# Patient Record
Sex: FEMALE | Race: WHITE | NOT HISPANIC OR LATINO | Employment: UNEMPLOYED | URBAN - NONMETROPOLITAN AREA
[De-identification: names, ages, dates, MRNs, and addresses within clinical notes are randomized per-mention and may not be internally consistent; named-entity substitution may affect disease eponyms.]

---

## 2017-07-15 ENCOUNTER — HOSPITAL ENCOUNTER (EMERGENCY)
Facility: HOSPITAL | Age: 52
Discharge: STILL ADMITTED OR ADMINISTRATIVE | End: 2017-07-16
Attending: EMERGENCY MEDICINE

## 2017-07-15 DIAGNOSIS — F32.1 MODERATE SINGLE CURRENT EPISODE OF MAJOR DEPRESSIVE DISORDER (HCC): ICD-10-CM

## 2017-07-15 DIAGNOSIS — F10.220 ALCOHOL DEPENDENCE WITH UNCOMPLICATED INTOXICATION (HCC): Primary | ICD-10-CM

## 2017-07-15 LAB
6-ACETYL MORPHINE: NEGATIVE
ALBUMIN SERPL-MCNC: 5 G/DL (ref 3.5–5)
ALBUMIN/GLOB SERPL: 1.4 G/DL (ref 1.5–2.5)
ALP SERPL-CCNC: 142 U/L (ref 35–104)
ALT SERPL W P-5'-P-CCNC: 77 U/L (ref 10–36)
AMPHET+METHAMPHET UR QL: NEGATIVE
ANION GAP SERPL CALCULATED.3IONS-SCNC: 10.1 MMOL/L (ref 3.6–11.2)
AST SERPL-CCNC: 138 U/L (ref 10–30)
BACTERIA UR QL AUTO: ABNORMAL /HPF
BARBITURATES UR QL SCN: NEGATIVE
BASOPHILS # BLD AUTO: 0.07 10*3/MM3 (ref 0–0.3)
BASOPHILS NFR BLD AUTO: 1.2 % (ref 0–2)
BENZODIAZ UR QL SCN: NEGATIVE
BILIRUB SERPL-MCNC: 0.5 MG/DL (ref 0.2–1.8)
BILIRUB UR QL STRIP: NEGATIVE
BUN BLD-MCNC: 7 MG/DL (ref 7–21)
BUN/CREAT SERPL: 11.1 (ref 7–25)
BUPRENORPHINE SERPL-MCNC: NEGATIVE NG/ML
CALCIUM SPEC-SCNC: 9.6 MG/DL (ref 7.7–10)
CANNABINOIDS SERPL QL: NEGATIVE
CHLORIDE SERPL-SCNC: 105 MMOL/L (ref 99–112)
CLARITY UR: CLEAR
CO2 SERPL-SCNC: 24.9 MMOL/L (ref 24.3–31.9)
COCAINE UR QL: NEGATIVE
COLOR UR: YELLOW
CREAT BLD-MCNC: 0.63 MG/DL (ref 0.43–1.29)
DEPRECATED RDW RBC AUTO: 42.8 FL (ref 37–54)
EOSINOPHIL # BLD AUTO: 0.14 10*3/MM3 (ref 0–0.7)
EOSINOPHIL NFR BLD AUTO: 2.3 % (ref 0–5)
ERYTHROCYTE [DISTWIDTH] IN BLOOD BY AUTOMATED COUNT: 11.5 % (ref 11.5–14.5)
ETHANOL BLD-MCNC: 313 MG/DL
ETHANOL UR QL: 0.31 %
GFR SERPL CREATININE-BSD FRML MDRD: 100 ML/MIN/1.73
GLOBULIN UR ELPH-MCNC: 3.5 GM/DL
GLUCOSE BLD-MCNC: 87 MG/DL (ref 70–110)
GLUCOSE UR STRIP-MCNC: NEGATIVE MG/DL
HCT VFR BLD AUTO: 44.6 % (ref 37–47)
HGB BLD-MCNC: 15.4 G/DL (ref 12–16)
HGB UR QL STRIP.AUTO: NEGATIVE
HYALINE CASTS UR QL AUTO: ABNORMAL /LPF
IMM GRANULOCYTES # BLD: 0.01 10*3/MM3 (ref 0–0.03)
IMM GRANULOCYTES NFR BLD: 0.2 % (ref 0–0.5)
KETONES UR QL STRIP: NEGATIVE
LEUKOCYTE ESTERASE UR QL STRIP.AUTO: ABNORMAL
LYMPHOCYTES # BLD AUTO: 1.92 10*3/MM3 (ref 1–3)
LYMPHOCYTES NFR BLD AUTO: 31.7 % (ref 21–51)
MCH RBC QN AUTO: 34.9 PG (ref 27–33)
MCHC RBC AUTO-ENTMCNC: 34.5 G/DL (ref 33–37)
MCV RBC AUTO: 101.1 FL (ref 80–94)
METHADONE UR QL SCN: NEGATIVE
MONOCYTES # BLD AUTO: 0.74 10*3/MM3 (ref 0.1–0.9)
MONOCYTES NFR BLD AUTO: 12.2 % (ref 0–10)
NEUTROPHILS # BLD AUTO: 3.17 10*3/MM3 (ref 1.4–6.5)
NEUTROPHILS NFR BLD AUTO: 52.4 % (ref 30–70)
NITRITE UR QL STRIP: NEGATIVE
NRBC BLD MANUAL-RTO: 0 /100 WBC (ref 0–0)
OPIATES UR QL: NEGATIVE
OSMOLALITY SERPL CALC.SUM OF ELEC: 276.7 MOSM/KG (ref 273–305)
OXYCODONE UR QL SCN: NEGATIVE
PCP UR QL SCN: NEGATIVE
PH UR STRIP.AUTO: 6.5 [PH] (ref 5–8)
PLATELET # BLD AUTO: 188 10*3/MM3 (ref 130–400)
PMV BLD AUTO: 10.2 FL (ref 6–10)
POTASSIUM BLD-SCNC: 4.2 MMOL/L (ref 3.5–5.3)
PROT SERPL-MCNC: 8.5 G/DL (ref 6–8)
PROT UR QL STRIP: NEGATIVE
RBC # BLD AUTO: 4.41 10*6/MM3 (ref 4.2–5.4)
RBC # UR: ABNORMAL /HPF
REF LAB TEST METHOD: ABNORMAL
SODIUM BLD-SCNC: 140 MMOL/L (ref 135–153)
SP GR UR STRIP: 1.01 (ref 1–1.03)
SQUAMOUS #/AREA URNS HPF: ABNORMAL /HPF
UROBILINOGEN UR QL STRIP: ABNORMAL
WBC NRBC COR # BLD: 6.05 10*3/MM3 (ref 4.5–12.5)
WBC UR QL AUTO: ABNORMAL /HPF

## 2017-07-15 PROCEDURE — 87186 SC STD MICRODIL/AGAR DIL: CPT | Performed by: EMERGENCY MEDICINE

## 2017-07-15 PROCEDURE — 87077 CULTURE AEROBIC IDENTIFY: CPT | Performed by: EMERGENCY MEDICINE

## 2017-07-15 RX ORDER — NICOTINE 21 MG/24HR
1 PATCH, TRANSDERMAL 24 HOURS TRANSDERMAL ONCE
Status: DISCONTINUED | OUTPATIENT
Start: 2017-07-15 | End: 2017-07-16 | Stop reason: HOSPADM

## 2017-07-15 RX ORDER — CHLORDIAZEPOXIDE HYDROCHLORIDE 25 MG/1
25 CAPSULE, GELATIN COATED ORAL ONCE
Status: COMPLETED | OUTPATIENT
Start: 2017-07-15 | End: 2017-07-15

## 2017-07-15 RX ORDER — LORAZEPAM 2 MG/ML
1 INJECTION INTRAMUSCULAR ONCE
Status: COMPLETED | OUTPATIENT
Start: 2017-07-15 | End: 2017-07-15

## 2017-07-15 RX ORDER — SODIUM CHLORIDE 0.9 % (FLUSH) 0.9 %
10 SYRINGE (ML) INJECTION AS NEEDED
Status: DISCONTINUED | OUTPATIENT
Start: 2017-07-15 | End: 2017-07-16 | Stop reason: HOSPADM

## 2017-07-15 RX ADMIN — LORAZEPAM 1 MG: 2 INJECTION INTRAMUSCULAR; INTRAVENOUS at 20:53

## 2017-07-15 RX ADMIN — CHLORDIAZEPOXIDE HYDROCHLORIDE 25 MG: 25 CAPSULE ORAL at 23:38

## 2017-07-15 RX ADMIN — THIAMINE HYDROCHLORIDE 1000 ML/HR: 100 INJECTION, SOLUTION INTRAMUSCULAR; INTRAVENOUS at 21:01

## 2017-07-15 RX ADMIN — NICOTINE 1 PATCH: 21 PATCH, EXTENDED RELEASE TRANSDERMAL at 21:01

## 2017-07-16 ENCOUNTER — HOSPITAL ENCOUNTER (INPATIENT)
Facility: HOSPITAL | Age: 52
LOS: 3 days | Discharge: HOME | End: 2017-07-19
Attending: PSYCHIATRY & NEUROLOGY | Admitting: PSYCHIATRY & NEUROLOGY

## 2017-07-16 VITALS
BODY MASS INDEX: 23.25 KG/M2 | SYSTOLIC BLOOD PRESSURE: 136 MMHG | DIASTOLIC BLOOD PRESSURE: 70 MMHG | WEIGHT: 157 LBS | RESPIRATION RATE: 18 BRPM | HEIGHT: 69 IN | HEART RATE: 86 BPM | TEMPERATURE: 98.1 F | OXYGEN SATURATION: 100 %

## 2017-07-16 PROBLEM — F10.20 ALCOHOL DEPENDENCE (HCC): Status: ACTIVE | Noted: 2017-07-16

## 2017-07-16 LAB
B-HCG UR QL: NEGATIVE
ETHANOL BLD-MCNC: 111 MG/DL
ETHANOL UR QL: 0.11 %

## 2017-07-16 PROCEDURE — 99223 1ST HOSP IP/OBS HIGH 75: CPT | Performed by: PSYCHIATRY & NEUROLOGY

## 2017-07-16 PROCEDURE — HZ2ZZZZ DETOXIFICATION SERVICES FOR SUBSTANCE ABUSE TREATMENT: ICD-10-PCS | Performed by: PSYCHIATRY & NEUROLOGY

## 2017-07-16 RX ORDER — LORAZEPAM 2 MG/1
2 TABLET ORAL
Status: COMPLETED | OUTPATIENT
Start: 2017-07-17 | End: 2017-07-17

## 2017-07-16 RX ORDER — ACETAMINOPHEN 325 MG/1
650 TABLET ORAL EVERY 4 HOURS PRN
Status: DISCONTINUED | OUTPATIENT
Start: 2017-07-16 | End: 2017-07-16

## 2017-07-16 RX ORDER — SULFAMETHOXAZOLE AND TRIMETHOPRIM 800; 160 MG/1; MG/1
1 TABLET ORAL EVERY 12 HOURS SCHEDULED
Status: COMPLETED | OUTPATIENT
Start: 2017-07-16 | End: 2017-07-18

## 2017-07-16 RX ORDER — NICOTINE 21 MG/24HR
1 PATCH, TRANSDERMAL 24 HOURS TRANSDERMAL DAILY
Status: DISCONTINUED | OUTPATIENT
Start: 2017-07-16 | End: 2017-07-19 | Stop reason: HOSPADM

## 2017-07-16 RX ORDER — ONDANSETRON 4 MG/1
4 TABLET, FILM COATED ORAL EVERY 6 HOURS PRN
Status: DISCONTINUED | OUTPATIENT
Start: 2017-07-16 | End: 2017-07-19 | Stop reason: HOSPADM

## 2017-07-16 RX ORDER — TRAZODONE HYDROCHLORIDE 50 MG/1
50 TABLET ORAL NIGHTLY PRN
Status: DISCONTINUED | OUTPATIENT
Start: 2017-07-16 | End: 2017-07-16

## 2017-07-16 RX ORDER — CLONIDINE HYDROCHLORIDE 0.1 MG/1
0.1 TABLET ORAL ONCE
Status: COMPLETED | OUTPATIENT
Start: 2017-07-16 | End: 2017-07-16

## 2017-07-16 RX ORDER — LORAZEPAM 0.5 MG/1
0.5 TABLET ORAL
Status: DISCONTINUED | OUTPATIENT
Start: 2017-07-20 | End: 2017-07-18

## 2017-07-16 RX ORDER — ALUMINA, MAGNESIA, AND SIMETHICONE 2400; 2400; 240 MG/30ML; MG/30ML; MG/30ML
15 SUSPENSION ORAL EVERY 6 HOURS PRN
Status: DISCONTINUED | OUTPATIENT
Start: 2017-07-16 | End: 2017-07-19 | Stop reason: HOSPADM

## 2017-07-16 RX ORDER — LORAZEPAM 1 MG/1
1 TABLET ORAL
Status: DISCONTINUED | OUTPATIENT
Start: 2017-07-19 | End: 2017-07-18

## 2017-07-16 RX ORDER — MIRTAZAPINE 15 MG/1
7.5 TABLET, FILM COATED ORAL NIGHTLY
Status: DISCONTINUED | OUTPATIENT
Start: 2017-07-16 | End: 2017-07-19 | Stop reason: HOSPADM

## 2017-07-16 RX ORDER — LOPERAMIDE HYDROCHLORIDE 2 MG/1
2 CAPSULE ORAL 4 TIMES DAILY PRN
Status: DISCONTINUED | OUTPATIENT
Start: 2017-07-16 | End: 2017-07-19 | Stop reason: HOSPADM

## 2017-07-16 RX ORDER — BENZTROPINE MESYLATE 1 MG/1
1 TABLET ORAL DAILY PRN
Status: DISCONTINUED | OUTPATIENT
Start: 2017-07-16 | End: 2017-07-19 | Stop reason: HOSPADM

## 2017-07-16 RX ORDER — LORAZEPAM 2 MG/1
2 TABLET ORAL EVERY 4 HOURS PRN
Status: DISPENSED | OUTPATIENT
Start: 2017-07-17 | End: 2017-07-18

## 2017-07-16 RX ORDER — THIAMINE HCL 50 MG
100 TABLET ORAL DAILY
Status: DISCONTINUED | OUTPATIENT
Start: 2017-07-16 | End: 2017-07-19 | Stop reason: HOSPADM

## 2017-07-16 RX ORDER — LORAZEPAM 1 MG/1
1 TABLET ORAL EVERY 4 HOURS PRN
Status: DISCONTINUED | OUTPATIENT
Start: 2017-07-19 | End: 2017-07-18

## 2017-07-16 RX ORDER — LORAZEPAM 2 MG/1
2 TABLET ORAL
Status: DISPENSED | OUTPATIENT
Start: 2017-07-16 | End: 2017-07-17

## 2017-07-16 RX ORDER — LORAZEPAM 0.5 MG/1
0.5 TABLET ORAL EVERY 4 HOURS PRN
Status: DISCONTINUED | OUTPATIENT
Start: 2017-07-20 | End: 2017-07-18

## 2017-07-16 RX ORDER — BENZONATATE 100 MG/1
100 CAPSULE ORAL 3 TIMES DAILY PRN
Status: DISCONTINUED | OUTPATIENT
Start: 2017-07-16 | End: 2017-07-19 | Stop reason: HOSPADM

## 2017-07-16 RX ORDER — HYDROXYZINE 50 MG/1
50 TABLET, FILM COATED ORAL EVERY 6 HOURS PRN
Status: DISCONTINUED | OUTPATIENT
Start: 2017-07-16 | End: 2017-07-19 | Stop reason: HOSPADM

## 2017-07-16 RX ORDER — BENZTROPINE MESYLATE 1 MG/ML
0.5 INJECTION INTRAMUSCULAR; INTRAVENOUS DAILY PRN
Status: DISCONTINUED | OUTPATIENT
Start: 2017-07-16 | End: 2017-07-19 | Stop reason: HOSPADM

## 2017-07-16 RX ORDER — IBUPROFEN 400 MG/1
600 TABLET ORAL EVERY 6 HOURS PRN
Status: DISCONTINUED | OUTPATIENT
Start: 2017-07-16 | End: 2017-07-19 | Stop reason: HOSPADM

## 2017-07-16 RX ORDER — FAMOTIDINE 20 MG/1
20 TABLET, FILM COATED ORAL 2 TIMES DAILY PRN
Status: DISCONTINUED | OUTPATIENT
Start: 2017-07-16 | End: 2017-07-19 | Stop reason: HOSPADM

## 2017-07-16 RX ADMIN — LORAZEPAM 2 MG: 2 TABLET ORAL at 07:38

## 2017-07-16 RX ADMIN — SULFAMETHOXAZOLE AND TRIMETHOPRIM 160 MG: 800; 160 TABLET ORAL at 21:57

## 2017-07-16 RX ADMIN — LORAZEPAM 2 MG: 2 TABLET ORAL at 16:07

## 2017-07-16 RX ADMIN — SULFAMETHOXAZOLE AND TRIMETHOPRIM 160 MG: 800; 160 TABLET ORAL at 12:53

## 2017-07-16 RX ADMIN — MIRTAZAPINE 7.5 MG: 15 TABLET, FILM COATED ORAL at 21:58

## 2017-07-16 RX ADMIN — LORAZEPAM 2 MG: 2 TABLET ORAL at 12:54

## 2017-07-16 RX ADMIN — HYDROXYZINE HYDROCHLORIDE 50 MG: 50 TABLET ORAL at 21:57

## 2017-07-16 RX ADMIN — LORAZEPAM 2 MG: 2 TABLET ORAL at 10:12

## 2017-07-16 RX ADMIN — HYDROXYZINE HYDROCHLORIDE 50 MG: 50 TABLET ORAL at 16:07

## 2017-07-16 RX ADMIN — CLONIDINE HYDROCHLORIDE 0.1 MG: 0.1 TABLET ORAL at 20:24

## 2017-07-16 RX ADMIN — HYDROXYZINE HYDROCHLORIDE 50 MG: 50 TABLET ORAL at 07:38

## 2017-07-16 RX ADMIN — LORAZEPAM 2 MG: 2 TABLET ORAL at 21:57

## 2017-07-16 RX ADMIN — THIAMINE HCL (VITAMIN B1) 50 MG TABLET 100 MG: at 10:12

## 2017-07-16 NOTE — NURSING NOTE
Dr. Matos notified of intake assessment, laboratory results, verbalizes understanding, reports new order to admit (Chemical Dependency), routine/comfort orders, Ativan Detox Protocol 2 mg PO every 2 hours PRN for withdrawal symptoms (start on admission day 1), Thiamine 100 mg PO NOW and daily.  RBTO x 2. .

## 2017-07-16 NOTE — NURSING NOTE
Monica RN - Lead notified of new order to admit (Chemical Dependency), insurance ([facility]), verbalizes understanding and reports bed assignment (Chemical Dependency - 31 B).

## 2017-07-16 NOTE — NURSING NOTE
PETE Sprague notified of blood pressure (155/95), verbalizes understanding, reports findings are indication of alcohol withdrawal symptom and no new order noted.

## 2017-07-16 NOTE — NURSING NOTE
Sydnie, Pharmacy Tech notified of new order to admit (Dr. Matos) and bed assignment (Chemical Dependency - 31 B).

## 2017-07-16 NOTE — NURSING NOTE
Patient to intake per ED staff.  Pockets emptied and through search complete.  Patient searched by intake nurse and witnessed (DIANA Calzada) per ED Policy 100.  Belongings logged on Personal Belongings Inventory Sheet.  Patient placed in hospital attire.  Room swept for any potential safety hazards, room cleared, and patient placed in treatment room.  Patient ready for evaluation.

## 2017-07-16 NOTE — H&P
"Eugenia Campo RN  Admission Date: 7/16/2017  6:39 PM 07/16/17    Ailyn Samano, 51 y.o. Female  Subjective     Chief Complaint:  Alcohol Dependence    HPI:  Ailyn Samano is a 51 y.o. female who was admitted for complaints of Alcohol Dependence.  Patient presented to [facility] ED requesting assistance with detoxification from alcohol.  She states she has been drinking 6 beers per day and 1/2 pint of whiskey every other day. She states she has one son who was tragically killed in a MVC 1 year ago.  She states she can't deal with losing her son and so much saddness in her life.  So she started drinking to take the pain away.  She states, \"Nobody understands how sad I am or will\".  Her initial Ethanol level was 313.  She states she drank last on 7/15/17.  She reports diaphoresis, nausea, generalized body aches, tremors, and intense cravings as her withdrawal symptoms.  She denies suicidal or homicidal ideations, hallucinations, delusions, or paranoia.  She reports memory loss and blackouts while drinking alcohol and states her longest period of sobriety has been 24 hours.  She is tearful, sad, and anxious at times.  She endorses worthlessness, hopelessness, and helplessness.  She has been admitted to the Chemical Dependency Unit for crisis intervention, safety, detoxification, and stabilization of symptoms.         Past Psych History: Patient denies any previous inpatient admissions, outpatient treatment, or past suicide attempts.    Substance Abuse: UDS is negative.  She denies any illicit drug use.  Her initial Ethanol level was 313. She states she has been drinking 6 beers per day and 1/2 pint of whiskey every other day.   She states she drank last on 7/15/17.  She smokes 1.5 PDD.    Family History:  family history includes Cancer in her mother; Coronary artery disease in her mother; Seizures in her maternal aunt.    Personal and social history:  Patient is from [place], [place].  She is  and has 1 son who was " tragically killed in a MVC 1 year ago.  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  She denies any past arrests or current legal issues.    Medical/Surgical History:  Negative history for seizures.    Past Medical History:   Diagnosis Date   • Alcohol abuse    • Alcoholism    • Anxiety    • Cancer     Cervical    • COPD (chronic obstructive pulmonary disease)    • Depression    • Elevated liver enzymes    • H/O cardiovascular stress test 2016    WNL   • Hyperlipidemia    • Hypertension    • Insomnia    • Left knee pain    • Lower back pain    • Neck pain    • Psoriasis    • Tobacco abuse    • Withdrawal symptoms, alcohol      Past Surgical History:   Procedure Laterality Date   •  SECTION     • HYSTERECTOMY      Total    • TIBIA FRACTURE SURGERY      vicki right leg   • TUMOR REMOVAL      Uterine (x 2)        No Known Allergies  Social History   Substance Use Topics   • Smoking status: Current Every Day Smoker     Packs/day: 1.50     Years: 15.00     Types: Cigarettes   • Smokeless tobacco: Never Used      Comment: Pt. declines counseling at this time.    • Alcohol use 25.2 oz/week     42 Cans of beer per week      Comment: 0.5 pint - [employer] - Daily      Current Medications:   Current Facility-Administered Medications   Medication Dose Route Frequency Provider Last Rate Last Dose   • aluminum-magnesium hydroxide-simethicone (MAALOX MAX) 400-400-40 MG/5ML suspension 15 mL  15 mL Oral Q6H PRN Jason Acuna MD       • benzonatate (TESSALON) capsule 100 mg  100 mg Oral TID PRN Jason Acuna MD       • benztropine (COGENTIN) tablet 1 mg  1 mg Oral Daily PRN Jason Acuna MD        Or   • benztropine (COGENTIN) injection 0.5 mg  0.5 mg Intramuscular Daily PRN Jason Acuna MD       • famotidine (PEPCID) tablet 20 mg  20 mg Oral BID PRN Jason Acuna MD       • hydrOXYzine (ATARAX) tablet 50 mg  50 mg Oral Q6H  PRN Jason Acuna MD   50 mg at 07/16/17 1607   • loperamide (IMODIUM) capsule 2 mg  2 mg Oral 4x Daily PRN Jason Acuna MD       • [START ON 7/17/2017] LORazepam (ATIVAN) tablet 2 mg  2 mg Oral 3 times per day Jason Acuna MD        Followed by   • [START ON 7/18/2017] LORazepam (ATIVAN) tablet 1.5 mg  1.5 mg Oral 3 times per day Jason Acuna MD        Followed by   • [START ON 7/19/2017] LORazepam (ATIVAN) tablet 1 mg  1 mg Oral 3 times per day Jason Acuna MD        Followed by   • [START ON 7/20/2017] LORazepam (ATIVAN) tablet 0.5 mg  0.5 mg Oral 3 times per day Jason Acuna MD       • [START ON 7/17/2017] LORazepam (ATIVAN) tablet 2 mg  2 mg Oral Q4H PRN Jason Acuna MD        Followed by   • [START ON 7/18/2017] LORazepam (ATIVAN) tablet 1.5 mg  1.5 mg Oral Q4H PRN Jason Acuna MD        Followed by   • [START ON 7/19/2017] LORazepam (ATIVAN) tablet 1 mg  1 mg Oral Q4H PRN Jason Acuna MD        Followed by   • [START ON 7/20/2017] LORazepam (ATIVAN) tablet 0.5 mg  0.5 mg Oral Q4H PRN Jason Acuna MD       • LORazepam (ATIVAN) tablet 2 mg  2 mg Oral Q2H PRN Jason Acuna MD   2 mg at 07/16/17 1607   • magnesium hydroxide (MILK OF MAGNESIA) suspension 2400 mg/10mL 10 mL  10 mL Oral Daily PRN Jason Acuna MD       • mirtazapine (REMERON) tablet 7.5 mg  7.5 mg Oral Nightly Jason Acuna MD       • nicotine (NICODERM CQ) 21 MG/24HR patch 1 patch  1 patch Transdermal Daily Jason Acuna MD       • ondansetron (ZOFRAN) tablet 4 mg  4 mg Oral Q6H PRN Jason Acuna MD       • sulfamethoxazole-trimethoprim (BACTRIM DS,SEPTRA DS) 800-160 MG per tablet 160 mg  1 tablet Oral Q12H Jason Acuna MD   160 mg at 07/16/17 1253   • vitamin B-1 tablet 100 mg  100 mg Oral Daily Jason Acuna MD   100 mg at 07/16/17 1012       Review of Systems   Eyes: Positive for visual disturbance.       Review of Systems - General ROS: negative for - chills, fever or  malaise  Ophthalmic ROS: negative for - loss of vision  ENT ROS: negative for - hearing change  Allergy and Immunology ROS: negative for - hives  Hematological and Lymphatic ROS: negative for - bleeding problems  Endocrine ROS: negative for - skin changes  Respiratory ROS: no cough, shortness of breath, or wheezing  Cardiovascular ROS: no chest pain or dyspnea on exertion  Gastrointestinal ROS: no abdominal pain, change in bowel habits, or black or bloody stools  Genito-Urinary ROS: no dysuria, trouble voiding, or hematuria  Musculoskeletal ROS: negative for - gait disturbance  Neurological ROS: no TIA or stroke symptoms  Dermatological ROS: negative for rash    Objective       General Appearance:    Alert, cooperative, in no acute distress   Head:    Normocephalic, without obvious abnormality, atraumatic   Eyes:            Lids and lashes normal, conjunctivae and sclerae normal, no   icterus, no pallor, corneas clear, PERRLA   Ears:    Ears appear intact with no abnormalities noted   Throat:   No oral lesions, no thrush, oral mucosa moist   Neck:   No adenopathy, supple, trachea midline, no thyromegaly, no     carotid bruit, no JVD   Back:     No kyphosis present, no scoliosis present, no skin lesions,       erythema or scars, no tenderness to percussion or                   palpation,   range of motion normal   Lungs:     Clear to auscultation,respirations regular, even and                   unlabored    Heart:    Regular rhythm and normal rate, normal S1 and S2, no            murmur, no gallop, no rub, no click   Breast Exam:    Deferred   Abdomen:     Normal bowel sounds, no masses, no organomegaly, soft        non-tender, non-distended, no guarding, no rebound                 tenderness   Genitalia:    Deferred   Extremities:   Moves all extremities well, no edema, no cyanosis, no              redness   Pulses:   Pulses palpable and equal bilaterally   Skin:   No bleeding, bruising or rash   Lymph nodes:   No  "palpable adenopathy   Neurologic:   Cranial nerves 2 - 12 grossly intact, sensation intact, DTR        present and equal bilaterally       Blood pressure (!) 154/101, pulse 96, temperature 97.4 °F (36.3 °C), temperature source Temporal Artery , resp. rate 18, height 71\" (180.3 cm), weight 172 lb 3.2 oz (78.1 kg), SpO2 98 %.    Mental Status Exam:   Hygiene:   fair  Cooperation:  Cooperative  Eye Contact:  Fair  Psychomotor Behavior:  Restless  Affect:  tearful  Hopelessness: 10  Speech:  Normal  Thought Process:  Linear  Thought Content:  Mood congurent  Suicidal:  None  Homicidal:  None  Hallucinations:  None  Delusion:  None  Memory:  Intact  Orientation:  Person, Place and Situation  Reliability:  fair  Insight:  Fair  Judgement:  Fair  Impulse Control:  Fair  Physical/Medical Issues:  Yes SEE MEDICAL HISTORY    Medical Decision Making:              Labs:      Results for JYOTI SIEGEL (MRN [id number]) as of 7/16/2017 18:40   Ref. Range 7/15/2017 20:53 7/15/2017 21:06 7/16/2017 02:32   Glucose Latest Ref Range: 70 - 110 mg/dL 87     Sodium Latest Ref Range: 135 - 153 mmol/L 140     Potassium Latest Ref Range: 3.5 - 5.3 mmol/L 4.2     CO2 Latest Ref Range: 24.3 - 31.9 mmol/L 24.9     Chloride Latest Ref Range: 99 - 112 mmol/L 105     Anion Gap Latest Ref Range: 3.6 - 11.2 mmol/L 10.1     Creatinine Latest Ref Range: 0.43 - 1.29 mg/dL 0.63     BUN Latest Ref Range: 7 - 21 mg/dL 7     BUN/Creatinine Ratio Latest Ref Range: 7.0 - 25.0  11.1     Calcium Latest Ref Range: 7.7 - 10.0 mg/dL 9.6     eGFR Non African Amer Latest Ref Range: >60 mL/min/1.73 100     Alkaline Phosphatase Latest Ref Range: 35 - 104 U/L 142 (H)     Total Protein Latest Ref Range: 6.0 - 8.0 g/dL 8.5 (H)     ALT (SGPT) Latest Ref Range: 10 - 36 U/L 77 (H)     AST (SGOT) Latest Ref Range: 10 - 30 U/L 138 (H)     Total Bilirubin Latest Ref Range: 0.2 - 1.8 mg/dL 0.5     Albumin Latest Ref Range: 3.50 - 5.00 g/dL 5.00     Globulin Latest Units: gm/dL " 3.5     A/G Ratio Latest Ref Range: 1.5 - 2.5 g/dL 1.4 (L)     WBC Latest Ref Range: 4.50 - 12.50 10*3/mm3 6.05     RBC Latest Ref Range: 4.20 - 5.40 10*6/mm3 4.41     Hemoglobin Latest Ref Range: 12.0 - 16.0 g/dL 15.4     Hematocrit Latest Ref Range: 37.0 - 47.0 % 44.6     RDW Latest Ref Range: 11.5 - 14.5 % 11.5     MCV Latest Ref Range: 80.0 - 94.0 fL 101.1 (H)     MCH Latest Ref Range: 27.0 - 33.0 pg 34.9 (H)     MCHC Latest Ref Range: 33.0 - 37.0 g/dL 34.5     MPV Latest Ref Range: 6.0 - 10.0 fL 10.2 (H)     Platelets Latest Ref Range: 130 - 400 10*3/mm3 188     RDW-SD Latest Ref Range: 37.0 - 54.0 fl 42.8     Neutrophil % Latest Ref Range: 30.0 - 70.0 % 52.4     Lymphocyte % Latest Ref Range: 21.0 - 51.0 % 31.7     Monocyte % Latest Ref Range: 0.0 - 10.0 % 12.2 (H)     Eosinophil % Latest Ref Range: 0.0 - 5.0 % 2.3     Basophil % Latest Ref Range: 0.0 - 2.0 % 1.2     Immature Grans % Latest Ref Range: 0.0 - 0.5 % 0.2     Neutrophils, Absolute Latest Ref Range: 1.40 - 6.50 10*3/mm3 3.17     Lymphocytes, Absolute Latest Ref Range: 1.00 - 3.00 10*3/mm3 1.92     Monocytes, Absolute Latest Ref Range: 0.10 - 0.90 10*3/mm3 0.74     Eosinophils, Absolute Latest Ref Range: 0.00 - 0.70 10*3/mm3 0.14     Basophils, Absolute Latest Ref Range: 0.00 - 0.30 10*3/mm3 0.07     Immature Grans, Absolute Latest Ref Range: 0.00 - 0.03 10*3/mm3 0.01     nRBC Latest Ref Range: 0.0 - 0.0 /100 WBC 0.0     Color, UA Latest Ref Range: Yellow, Straw   Yellow    Appearance, UA Latest Ref Range: Clear   Clear    Specific [place], UA Latest Ref Range: 1.005 - 1.030   1.008    pH, UA Latest Ref Range: 5.0 - 8.0   6.5    Glucose, UA Latest Ref Range: Negative   Negative    Ketones, UA Latest Ref Range: Negative   Negative    Blood, UA Latest Ref Range: Negative   Negative    Nitrite, UA Latest Ref Range: Negative   Negative    Leuk Esterase, UA Latest Ref Range: Negative   Small (1+) (A)    Protein, UA Latest Ref Range: Negative   Negative     Bilirubin, UA Latest Ref Range: Negative   Negative    Urobilinogen, UA Latest Ref Range: 0.2 - 1.0 E.U./dL   0.2 E.U./dL    RBC, UA Latest Ref Range: None Seen /HPF  0-2 (A)    WBC, UA Latest Ref Range: None Seen /HPF  3-5 (A)    Bacteria, UA Latest Ref Range: None Seen /HPF  4+ (A)    Squamous Epithelial Cells, UA Latest Ref Range: None Seen, 0-2 /HPF  0-2    Hyaline Casts, UA Latest Ref Range: None Seen /LPF  None Seen    Methodology: Unknown  Automated Microscopy    HCG, Urine QL Latest Ref Range: Negative   Negative    URINE CULTURE Unknown  Rpt ((NONE))    Ethanol % Latest Units: % 0.313  0.111   Ethanol Latest Ref Range: <=10 mg/dL 313 (H)  111 (H)   Amphetamine Screen, Urine Latest Ref Range: Negative   Negative    Barbiturates Screen, Urine Latest Ref Range: Negative   Negative    Benzodiazepine Screen, Urine Latest Ref Range: Negative   Negative    Buprenorphine, Screen, Urine Latest Ref Range: Negative   Negative    Cocaine Screen, Urine Latest Ref Range: Negative   Negative    Methadone Screen , Urine Latest Ref Range: Negative   Negative    Opiate Screen, Urine Latest Ref Range: Negative   Negative    Oxycodone Screen, Urine Latest Ref Range: Negative   Negative    Phencyclidine (PCP), Urine Latest Ref Range: Negative   Negative    THC Screen, Urine Latest Ref Range: Negative   Negative    6-ACETYL MORPHINE Latest Ref Range: Negative   Negative    Osmolality Calc Latest Ref Range: 273.0 - 305.0 mOsm/kg 276.7                 Medications:                [START ON 7/17/2017] LORazepam 2 mg Oral 3 times per day   Followed by      [START ON 7/18/2017] LORazepam 1.5 mg Oral 3 times per day   Followed by      [START ON 7/19/2017] LORazepam 1 mg Oral 3 times per day   Followed by      [START ON 7/20/2017] LORazepam 0.5 mg Oral 3 times per day   mirtazapine 7.5 mg Oral Nightly   nicotine 1 patch Transdermal Daily   sulfamethoxazole-trimethoprim 1 tablet Oral Q12H   vitamin B-1 100 mg Oral Daily                   •  aluminum-magnesium hydroxide-simethicone  •  benzonatate  •  benztropine **OR** benztropine  •  famotidine  •  hydrOXYzine  •  loperamide  •  [START ON 7/17/2017] LORazepam **FOLLOWED BY** [START ON 7/18/2017] LORazepam **FOLLOWED BY** [START ON 7/19/2017] LORazepam **FOLLOWED BY** [START ON 7/20/2017] LORazepam  •  LORazepam  •  magnesium hydroxide  •  ondansetron   All medications reviewed.    Special Precautions: Continue current level of Special Precautions.            Assessment/Plan    Monitor and treat in a safe and secure environment.       Patient Active Problem List   Diagnosis Code   • Tobacco abuse Z72.0   • Psoriasis L40.9   • Lower back pain M54.5   • Hypertension I10   • Hyperlipidemia E78.5   • Depression F32.9   • COPD (chronic obstructive pulmonary disease) J44.9   • Anxiety F41.9   • Elevated liver enzymes R74.8   • Alcohol dependence F10.20       The patient has been admitted to the [facility] for safety and symptom stabilization. The patient has been given routine orders and placed on special precautions. The patient will be assigned a Master Level Therapist. Dr. INDIA Acuna will assess the patient daily and work with the treatment team to develop a plan of care.     Discussed risks, benefits, and side effects of the above medication and the patient was agreeable with the plan.             Attestation:  I, Eugenia Campo RN acted as scribe for Dr. INDIA Acuna.                Physician Attestation: I attest that the above note accurately reflects work and decisions made by me.

## 2017-07-16 NOTE — NURSING NOTE
Monica, RN - Lead informed of blood pressure assessment (155/95), provider notification (PETE Sprague APRN) with no new order, floor nurse (JEMAL Hawkins - Chemical Dependency) verbalizing understanding and reports she will administer Ativan 2 mg PO (PRN withdrawal symptoms) as ordered upon arrival.

## 2017-07-16 NOTE — ED PROVIDER NOTES
Subjective   Patient is a 51 y.o. female presenting with mental health disorder.   History provided by:  Patient   used: No    Mental Health Problem   Presenting symptoms: agitation, depression and disorganized thought process    Presenting symptoms comment:  Alcohol dependence  Degree of incapacity (severity):  Moderate  Onset quality:  Gradual  Timing:  Constant  Progression:  Worsening  Chronicity:  Recurrent  Context: alcohol use and noncompliance    Context: not drug abuse    Treatment compliance:  Untreated  Relieved by:  Nothing  Worsened by:  Nothing  Ineffective treatments:  None tried  Associated symptoms: anxiety, feelings of worthlessness and irritability    Associated symptoms: no abdominal pain and no anhedonia    Risk factors: family hx of mental illness, family violence and hx of mental illness        Review of Systems   Constitutional: Positive for irritability.   HENT: Negative.    Eyes: Negative.    Respiratory: Negative.    Cardiovascular: Negative.    Gastrointestinal: Negative.  Negative for abdominal pain.   Endocrine: Negative.    Genitourinary: Negative.    Musculoskeletal: Negative.    Skin: Negative.    Allergic/Immunologic: Negative.    Neurological: Negative.    Hematological: Negative.    Psychiatric/Behavioral: Positive for agitation. The patient is nervous/anxious.    All other systems reviewed and are negative.      Past Medical History:   Diagnosis Date   • Anxiety    • COPD (chronic obstructive pulmonary disease)    • Depression    • Elevated liver enzymes    • H/O cardiovascular stress test 03/22/2016    WNL   • Hyperlipidemia    • Hypertension    • Insomnia    • Left knee pain    • Lower back pain    • Neck pain    • Psoriasis    • Tobacco abuse        No Known Allergies    Past Surgical History:   Procedure Laterality Date   • HYSTERECTOMY     • TIBIA FRACTURE SURGERY  1991    vicki right leg       Family History   Problem Relation Age of Onset   • Coronary  artery disease Mother    • Cancer Mother      breast       Social History     Social History   • Marital status: Single     Spouse name: N/A   • Number of children: N/A   • Years of education: N/A     Social History Main Topics   • Smoking status: Current Every Day Smoker     Packs/day: 1.50   • Smokeless tobacco: Never Used   • Alcohol use No   • Drug use: No   • Sexual activity: Defer     Other Topics Concern   • None     Social History Narrative           Objective   Physical Exam   Constitutional: She is oriented to person, place, and time. She appears well-developed and well-nourished.   Tearful   HENT:   Head: Normocephalic and atraumatic.   Nose: Nose normal.   Mouth/Throat: Oropharynx is clear and moist.   Eyes: EOM are normal. Pupils are equal, round, and reactive to light.   Neck: Normal range of motion. Neck supple.   Cardiovascular: Normal rate, regular rhythm, normal heart sounds and intact distal pulses.    Pulmonary/Chest: Effort normal and breath sounds normal.   Abdominal: Soft. Bowel sounds are normal.   Musculoskeletal: Normal range of motion.   Neurological: She is alert and oriented to person, place, and time.   Skin: Skin is warm and dry.   Psychiatric:   Tearful, anxious. Depressed affect   Nursing note and vitals reviewed.      Procedures         ED Course  ED Course                  MDM  Number of Diagnoses or Management Options  Alcohol dependence with uncomplicated intoxication: new and requires workup  Moderate single current episode of major depressive disorder: new and requires workup     Amount and/or Complexity of Data Reviewed  Clinical lab tests: ordered and reviewed  Tests in the medicine section of CPT®: reviewed and ordered    Risk of Complications, Morbidity, and/or Mortality  Presenting problems: moderate  Diagnostic procedures: moderate  Management options: moderate    Patient Progress  Patient progress: improved      Final diagnoses:   Alcohol dependence with uncomplicated  intoxication   Moderate single current episode of major depressive disorder            Carmen Sprague, APRN  07/15/17 5615

## 2017-07-17 PROCEDURE — 99232 SBSQ HOSP IP/OBS MODERATE 35: CPT | Performed by: PSYCHIATRY & NEUROLOGY

## 2017-07-17 RX ADMIN — IBUPROFEN 600 MG: 400 TABLET ORAL at 20:06

## 2017-07-17 RX ADMIN — LORAZEPAM 2 MG: 2 TABLET ORAL at 00:04

## 2017-07-17 RX ADMIN — THIAMINE HCL (VITAMIN B1) 50 MG TABLET 100 MG: at 08:41

## 2017-07-17 RX ADMIN — LORAZEPAM 2 MG: 2 TABLET ORAL at 08:41

## 2017-07-17 RX ADMIN — LORAZEPAM 2 MG: 2 TABLET ORAL at 03:44

## 2017-07-17 RX ADMIN — SULFAMETHOXAZOLE AND TRIMETHOPRIM 160 MG: 800; 160 TABLET ORAL at 08:41

## 2017-07-17 RX ADMIN — LORAZEPAM 2 MG: 2 TABLET ORAL at 20:06

## 2017-07-17 RX ADMIN — LORAZEPAM 2 MG: 2 TABLET ORAL at 22:28

## 2017-07-17 RX ADMIN — HYDROXYZINE HYDROCHLORIDE 50 MG: 50 TABLET ORAL at 20:00

## 2017-07-17 RX ADMIN — SULFAMETHOXAZOLE AND TRIMETHOPRIM 160 MG: 800; 160 TABLET ORAL at 20:06

## 2017-07-17 RX ADMIN — LORAZEPAM 2 MG: 2 TABLET ORAL at 06:18

## 2017-07-17 RX ADMIN — LORAZEPAM 2 MG: 2 TABLET ORAL at 14:12

## 2017-07-17 RX ADMIN — HYDROXYZINE HYDROCHLORIDE 50 MG: 50 TABLET ORAL at 03:44

## 2017-07-17 RX ADMIN — IBUPROFEN 600 MG: 400 TABLET ORAL at 00:04

## 2017-07-17 RX ADMIN — MIRTAZAPINE 7.5 MG: 15 TABLET, FILM COATED ORAL at 20:06

## 2017-07-17 NOTE — NURSING NOTE
Pt requesting AMA -says she has to be at work at 5 am and cannot miss.  Needs the money to pay bills.  Pt has had several PASS doses of Ativan due to high BP and withdrawal.  Pts BP continues to be elevated.  Nurse explained that pt would need to be medically stable before she could be D/C.  Dr Lozano agreed pt needs to stay tonight and can review in the AM.

## 2017-07-17 NOTE — PROGRESS NOTES
"Behavioral Health Treatment Plan and Problem List: I have reviewed and approved the Behavioral Health Treatment Plan and Problem list.     LOS: 1 day     Allergies  No Known Allergies    Assessment completed within view of staff    Chief Complaint:  Alcohol use    Subjective     Interval History: The patient states that she is feeling better and would like to leave. Her heart rate is elevated and she is encouraged to stay in the hospital and continue treatment. She reluctantly agreed.      Review of Systems:   General ROS: negative for - fever or malaise  Endocrine ROS: negative for - palpitations  Respiratory ROS: no cough, shortness of breath, or wheezing  Cardiovascular ROS: no chest pain or dyspnea on exertion  Gastrointestinal ROS: no abdominal pain,no black or bloody stools    Objective     Vital Signs  /84 (BP Location: Right arm, Patient Position: Sitting)  Pulse 117  Temp 97.5 °F (36.4 °C) (Temporal Artery )   Resp 18  Ht 71\" (180.3 cm)  Wt 172 lb 3.2 oz (78.1 kg)  SpO2 99%  BMI 24.02 kg/m2    Mental Status Exam:   Mood/Affect: sad/depressed  Thought Processes:  linear, logical, and goal directed  Thought Content:  normal  Hallucination(s): no  Hopelessness: no  Suicidal Thoughts:  none  Suicidal Plan/Intent: none  Homicidal Thoughts:  absent     Results Review:    Lab Results (last 24 hours)     ** No results found for the last 24 hours. **        Imaging Results (last 24 hours)     ** No results found for the last 24 hours. **          Medication Review:   Scheduled Medications:    LORazepam 2 mg Oral 3 times per day   Followed by      [START ON 7/18/2017] LORazepam 1.5 mg Oral 3 times per day   Followed by      [START ON 7/19/2017] LORazepam 1 mg Oral 3 times per day   Followed by      [START ON 7/20/2017] LORazepam 0.5 mg Oral 3 times per day   mirtazapine 7.5 mg Oral Nightly   nicotine 1 patch Transdermal Daily   sulfamethoxazole-trimethoprim 1 tablet Oral Q12H   vitamin B-1 100 mg Oral " Daily        PRN Medications:  •  aluminum-magnesium hydroxide-simethicone  •  benzonatate  •  benztropine **OR** benztropine  •  famotidine  •  hydrOXYzine  •  ibuprofen  •  loperamide  •  LORazepam **FOLLOWED BY** [START ON 7/18/2017] LORazepam **FOLLOWED BY** [START ON 7/19/2017] LORazepam **FOLLOWED BY** [START ON 7/20/2017] LORazepam  •  magnesium hydroxide  •  ondansetron   All medications reviewed.      Assessment/Plan   Patient Active Problem List   Diagnosis Code   • Tobacco abuse Z72.0   • Psoriasis L40.9   • Lower back pain M54.5   • Hypertension I10   • Hyperlipidemia E78.5   • Depression F32.9   • COPD (chronic obstructive pulmonary disease) J44.9   • Anxiety F41.9   • Elevated liver enzymes R74.8   • Alcohol dependence F10.20     Plan:  - Continue detox.        Andrew Stewart MD  07/17/17  2:45 PM

## 2017-07-17 NOTE — PLAN OF CARE
Problem:  Patient Care Overview (Adult)  Goal: Individualization and Mutuality  Outcome: Ongoing (interventions implemented as appropriate)    07/17/17 1728   Behavioral Health Screens   Patient Personal Strengths expressive of emotions;independent living skills;no history of violence;stable living environment   Patient Personal Strengths Comment The Patient appears to have a stable living environment and has been employed at [employer] for several years.    Patient Vulnerabilities Unresolved grief   Individualization   Patient Specific Preferences None   Patient Specific Goals None   Patient Specific Interventions None   Mutuality/Individual Preferences   What Anxieties, Fears or Concerns Do You Have About Your Health or Care? Patient is worried that she will not be able to pay her bills if she doesnt return to work.    What Questions Do You Have About Your Health or Care? None   What Information Would Help Us Give You More Personalized Care? None       Goal: Discharge Needs Assessment  Outcome: Ongoing (interventions implemented as appropriate)    07/16/17 0718 07/17/17 1728   Discharge Needs Assessment   Concerns To Be Addressed --  medication concerns;mental health concerns   Readmission Within The Last 30 Days --  no previous admission in last 30 days   Community Agency Name(S) --  [facility]   Current Discharge Risk --  substance abuse   Discharge Needs Assessment   Outpatient/Agency/Support Group Needs --  outpatient medication management   Anticipated Discharge Disposition --  home with family   Discharge Disposition --  home with family   Living Environment   Transportation Available family or friend will provide --          DATA:     Introduced self to the Patient as Therapist, she was agreeable. Therapist completed  the initial assessment on this date including the Social History, Treatment Planning with review of the Patient's Care Plan, and Integrated Summary. Therapist  discussed the plan of care and expectations of treatment. Therapist also began discussing plans for aftercare with the Patient. Therapist encouraged family involvement, however, she was only agreeable to consider at this time. She reports that her boyfriend is quiet and doesn't like to talk to people much.      ASSESSMENT:      The Patient presented to the Emergency Room at [facility] with depression and alcohol addiction. She reports that she began drinking daily since the death of her son two and a half years ago. The Patient reports that she uses drinking as a coping skill. The Patient was experiencing withdrawal from alcohol and was not safe for discharge at the time of assessment. The Patient was tearful during assessment, she was apologetic, with no reason. She seems anxious and has unresolved grief.      PLAN:  The Patient will be provided with an individualized plan of care to promote safety and stabilization while hospitalized. The Treatment Team will also develop a  plan for aftercare to meet the Patient's needs post discharge. The Patient will receive individual therapy 1-4 times prior to discharge and group therapy daily. The Patient requests to be scheduled with Rosi at [facility], preferably Monday, Thursday or Friday after 1:00pm due to her schedule at work.

## 2017-07-18 LAB — BACTERIA SPEC AEROBE CULT: ABNORMAL

## 2017-07-18 PROCEDURE — 99232 SBSQ HOSP IP/OBS MODERATE 35: CPT | Performed by: PSYCHIATRY & NEUROLOGY

## 2017-07-18 RX ORDER — LORAZEPAM 0.5 MG/1
0.5 TABLET ORAL 3 TIMES DAILY PRN
Status: DISPENSED | OUTPATIENT
Start: 2017-07-18 | End: 2017-07-19

## 2017-07-18 RX ADMIN — SULFAMETHOXAZOLE AND TRIMETHOPRIM 160 MG: 800; 160 TABLET ORAL at 08:11

## 2017-07-18 RX ADMIN — LORAZEPAM 0.5 MG: 0.5 TABLET ORAL at 21:14

## 2017-07-18 RX ADMIN — LORAZEPAM 1.5 MG: 1 TABLET ORAL at 08:13

## 2017-07-18 RX ADMIN — IBUPROFEN 600 MG: 400 TABLET ORAL at 16:26

## 2017-07-18 RX ADMIN — THIAMINE HCL (VITAMIN B1) 50 MG TABLET 100 MG: at 08:11

## 2017-07-18 RX ADMIN — HYDROXYZINE HYDROCHLORIDE 50 MG: 50 TABLET ORAL at 16:26

## 2017-07-18 RX ADMIN — MIRTAZAPINE 7.5 MG: 15 TABLET, FILM COATED ORAL at 21:13

## 2017-07-18 RX ADMIN — SULFAMETHOXAZOLE AND TRIMETHOPRIM 160 MG: 800; 160 TABLET ORAL at 21:13

## 2017-07-18 NOTE — PLAN OF CARE
Problem:  Patient Care Overview (Adult)  Goal: Discharge Needs Assessment  Outcome: Ongoing (interventions implemented as appropriate)    07/16/17 0718 07/17/17 1726   Discharge Needs Assessment   Concerns To Be Addressed --  medication concerns;mental health concerns   Readmission Within The Last 30 Days --  no previous admission in last 30 days   Community Agency Name(S) --  [facility]   Current Discharge Risk --  substance abuse   Discharge Needs Assessment   Outpatient/Agency/Support Group Needs --  outpatient medication management   Anticipated Discharge Disposition --  home with family   Discharge Disposition --  home with family   Living Environment   Transportation Available family or friend will provide --    DATA: Met with patient and she reported that she is struggling with getting sleep.  She reported that neurontin and klonopin have helped her sleep in the past.  Discussed that she can become dependent on these medications and these are not medications that the doctor usually prescribes.  Encouraged her to discuss her sleep issues with the doctor and to discuss options that she will not have addiction issues with.  Patient reported that she will return to work upon her stabilization and will attend [facility] in [place].  She continues to be resistant to attend residential treatment.     ASSESSMENT:  Patients motivation at this time appears to be minimal and insight is poor.  Patient reports ongoing issues with sleep and some ongoing withdrawal symptoms.     PLAN:  Patient will continue stabilization.  Patient is planned to attend [facility] upon stabilization.

## 2017-07-18 NOTE — PROGRESS NOTES
"Behavioral Health Treatment Plan and Problem List: I have reviewed and approved the Behavioral Health Treatment Plan and Problem list.     LOS: 2 days     Allergies  No Known Allergies    Assessment completed within view of staff    Chief Complaint:  Alcohol use    Subjective     Interval History: The patient states that she was not able to sleep good last night. States that the medications are helping some. She wants to leave today, but agreed to change the detox to last day today and if she did well, she will be discharged tomorrow.    Review of Systems:   General ROS: negative for - fever or malaise  Endocrine ROS: negative for - palpitations  Respiratory ROS: no cough, shortness of breath, or wheezing  Cardiovascular ROS: no chest pain or dyspnea on exertion  Gastrointestinal ROS: no abdominal pain,no black or bloody stools    Objective     Vital Signs  /77 (BP Location: Right arm, Patient Position: Sitting)  Pulse 96  Temp 97.2 °F (36.2 °C) (Temporal Artery )   Resp 20  Ht 71\" (180.3 cm)  Wt 172 lb 3.2 oz (78.1 kg)  SpO2 98%  BMI 24.02 kg/m2    Mental Status Exam:   Mood/Affect: sad/depressed  Thought Processes:  linear, logical, and goal directed  Thought Content:  normal  Hallucination(s): no  Hopelessness: no  Suicidal Thoughts:  none  Suicidal Plan/Intent: none  Homicidal Thoughts:  absent     Results Review:    Lab Results (last 24 hours)     ** No results found for the last 24 hours. **        Imaging Results (last 24 hours)     ** No results found for the last 24 hours. **          Medication Review:   Scheduled Medications:    LORazepam 1.5 mg Oral 3 times per day   Followed by      [START ON 7/19/2017] LORazepam 1 mg Oral 3 times per day   Followed by      [START ON 7/20/2017] LORazepam 0.5 mg Oral 3 times per day   mirtazapine 7.5 mg Oral Nightly   nicotine 1 patch Transdermal Daily   sulfamethoxazole-trimethoprim 1 tablet Oral Q12H   vitamin B-1 100 mg Oral Daily        PRN " Medications:  •  aluminum-magnesium hydroxide-simethicone  •  benzonatate  •  benztropine **OR** benztropine  •  famotidine  •  hydrOXYzine  •  ibuprofen  •  loperamide  •  [] LORazepam **FOLLOWED BY** LORazepam **FOLLOWED BY** [START ON 2017] LORazepam **FOLLOWED BY** [START ON 2017] LORazepam  •  magnesium hydroxide  •  ondansetron   All medications reviewed.      Assessment/Plan   Patient Active Problem List   Diagnosis Code   • Tobacco abuse Z72.0   • Psoriasis L40.9   • Lower back pain M54.5   • Hypertension I10   • Hyperlipidemia E78.5   • Depression F32.9   • COPD (chronic obstructive pulmonary disease) J44.9   • Anxiety F41.9   • Elevated liver enzymes R74.8   • Alcohol dependence F10.20     Plan:  - Change Ativan detox to day 4 today.        Andrew Stewart MD  17  10:24 AM

## 2017-07-18 NOTE — DISCHARGE INSTR - APPOINTMENTS
[facility]   [address], [address]  [phone]    July 21, 2017 @ 9:00am     This is the soonest appointment available.

## 2017-07-18 NOTE — PLAN OF CARE
Problem:  Patient Care Overview (Adult)  Goal: Plan of Care Review  Outcome: Ongoing (interventions implemented as appropriate)    07/18/17 0224   Coping/Psychosocial Response Interventions   Plan Of Care Reviewed With patient   Coping/Psychosocial   Patient Agreement with Plan of Care agrees   Patient Care Overview   Progress improving   Outcome Evaluation   Outcome Summary/Follow up Plan Pt. extremely anxious and tearful at the beginning of the shift. Pt. rates anxiety and depression 10/10. Pt. denies any cravings at this time. C/O HA, nausea, and shoulder pain. Pt. has some audible wheezes in her rt. upper lobes and has some cough/congestion which may be contributing to her rt. Shoulder pain.         Problem:  Overarching Goals  Goal: Adheres to Safety Considerations for Self and Others  Outcome: Ongoing (interventions implemented as appropriate)  Goal: Optimized Coping Skills in Response to Life Stressors  Outcome: Ongoing (interventions implemented as appropriate)  Goal: Develops/Participates in Therapeutic [place] to Support Successful Transition  Outcome: Ongoing (interventions implemented as appropriate)

## 2017-07-19 VITALS
HEART RATE: 102 BPM | TEMPERATURE: 98.1 F | WEIGHT: 172.2 LBS | RESPIRATION RATE: 18 BRPM | DIASTOLIC BLOOD PRESSURE: 71 MMHG | BODY MASS INDEX: 24.11 KG/M2 | SYSTOLIC BLOOD PRESSURE: 122 MMHG | OXYGEN SATURATION: 98 % | HEIGHT: 71 IN

## 2017-07-19 PROCEDURE — 99238 HOSP IP/OBS DSCHRG MGMT 30/<: CPT | Performed by: PSYCHIATRY & NEUROLOGY

## 2017-07-19 RX ORDER — MIRTAZAPINE 7.5 MG/1
7.5 TABLET, FILM COATED ORAL NIGHTLY
Qty: 30 TABLET | Refills: 0 | Status: ON HOLD | OUTPATIENT
Start: 2017-07-19 | End: 2018-08-03

## 2017-07-19 RX ADMIN — HYDROXYZINE HYDROCHLORIDE 50 MG: 50 TABLET ORAL at 08:30

## 2017-07-19 RX ADMIN — THIAMINE HCL (VITAMIN B1) 50 MG TABLET 100 MG: at 08:30

## 2017-07-19 RX ADMIN — IBUPROFEN 600 MG: 400 TABLET ORAL at 08:29

## 2017-07-19 NOTE — PLAN OF CARE
Problem: BH Patient Care Overview (Adult)  Goal: Plan of Care Review  Outcome: Ongoing (interventions implemented as appropriate)    07/18/17 1800   Coping/Psychosocial Response Interventions   Plan Of Care Reviewed With patient   Coping/Psychosocial   Patient Agreement with Plan of Care agrees   Patient Care Overview   Progress improving   Outcome Evaluation   Outcome Summary/Follow up Plan interacting well.

## 2017-07-19 NOTE — PROGRESS NOTES
Bridge Session  Date:  7/19/2017  Time:  7381-8922    Data:  Reason for Inpatient Admission: Acute withdrawal from Alcohol Dependence    After Care:  [facility]                       [address] [place] [address], [id number]                      [phone]                        July 21, 2017 @ 9:00am       Coping Skills to Utilize:  Work, stay busy, spending time with grandson and family, attending AA or Cele[facility] Recovery meetings    Crisis Safety Plan:  • Support System to utilize and contact numbers: Sister Tayla Hawley and niece Dinorah Looney.  Patient reports she has the numbers in her phone.    • Educated on crisis hotline numbers (yes/no): yes    • Was the Patient made aware of contact information for the following: community mental health centers, crisis stabilization programs, residential programs, , etc (yes/no): yes    • Will transportation be a barrier (yes/no): no    • If so, explain solution(s) to resolve barrier: N/A    • How and where will the patient obtain prescribed medications:  Patient obtains medications from [facility] in [place] and reports he insurance covers her medications    Assessment:  Patient reports feeling better with no acute withdrawals or cravings today.  Patient reports she is ready for discharge home.  Patient denies suicidal ideation and denies homicidal ideation.  Patient verbalizes agreement with safety plan.    Plan:    Discussed the importance of follow up treatment for continuity of care. The Patient was able to verbalize understanding and commitment to the individualized aftercare and crisis safety plan.

## 2017-07-19 NOTE — PLAN OF CARE
Problem:  Patient Care Overview (Adult)  Goal: Plan of Care Review  Outcome: Ongoing (interventions implemented as appropriate)    07/19/17 0546   Coping/Psychosocial Response Interventions   Plan Of Care Reviewed With patient   Coping/Psychosocial   Patient Agreement with Plan of Care agrees   Patient Care Overview   Progress improving   Outcome Evaluation   Outcome Summary/Follow up Plan Pt states she's had a pretty good day. Rates her anxiety and depression both at 10. Was in day room this evening and has slept well most of night . CIWA 5. States eating well         Problem:  Overarching Goals  Goal: Adheres to Safety Considerations for Self and Others  Outcome: Ongoing (interventions implemented as appropriate)  Goal: Optimized Coping Skills in Response to Life Stressors  Outcome: Ongoing (interventions implemented as appropriate)  Goal: Develops/Participates in Therapeutic [place] to Support Successful Transition  Outcome: Ongoing (interventions implemented as appropriate)

## 2017-07-19 NOTE — DISCHARGE SUMMARY
Date of Discharge:  7/19/2017    Discharge Diagnosis:Active Problems:    Alcohol dependence        Presenting Problem/History of Present Illness  Alcohol dependence [F10.20]     Hospital Course  Patient is a 51 y.o. female presented with alcohol use and withdrawals. The patient was admitted to the [facility] detox recovery unit for safety, further evaluation and treatment.  She was started on Ativan detox. The next day she reported that she was feeling better and wanted to leave. Her heart rate was elevated and she was encouraged to stay longer and she reluctantly agreed. She stayed one more day and her detox was shortened and on 7/19/17 she didn't have any detox medications and reported no cravings or withdrawals and was then discharged.  The patient was given a course of Bactrim DS for three days for UTI. The culture result was reviewed later and it showed E-Coli resistant to Bactrim. The patient was contacted after she was discharged and informed of the new antibiotic prescription for Macrobid 100 mg bid for 7 days.  The patient was also able to take part in individual and group counseling sessions and work on appropriate coping skills.  The patient made steady improvement in her mood and expressed feeling more positive and hopeful about future. Sleep and appetite were improved.  The day of discharge the patient was calm, cooperative and pleasant. Mood was reported to be good, and denied SI/HI/AVH. Also reported no medication side effects.            Condition on Discharge:  improved    Vital Signs  Temp:  [96.2 °F (35.7 °C)-97.6 °F (36.4 °C)] 97.6 °F (36.4 °C)  Heart Rate:  [] 103  Resp:  [18-20] 18  BP: (109-129)/(67-85) 129/85      Discharge Disposition  Home or Self Care    Discharge Medications   Ailyn Samano   Home Medication Instructions WILFREDO:[id number]    Printed on:07/19/17 1159   Medication Information                      mirtazapine (REMERON) 7.5 MG tablet  Take 1 tablet by mouth Every  Night.                 Discharge Diet: Regular    Activity at Discharge: As tolerated    Follow-up Appointments  Bobby ARZOLA.    Test Results Pending at Discharge       Andrew Stewart MD  07/19/17  11:50 AM

## 2017-08-03 RX ORDER — NITROFURANTOIN 25; 75 MG/1; MG/1
100 CAPSULE ORAL 2 TIMES DAILY
Qty: 14 CAPSULE | Refills: 0 | Status: SHIPPED | OUTPATIENT
Start: 2017-08-03 | End: 2017-08-10

## 2017-11-25 ENCOUNTER — HOSPITAL ENCOUNTER (EMERGENCY)
Facility: HOSPITAL | Age: 52
Discharge: HOME | End: 2017-11-26
Attending: EMERGENCY MEDICINE | Admitting: NURSE PRACTITIONER

## 2017-11-25 DIAGNOSIS — F33.2 SEVERE EPISODE OF RECURRENT MAJOR DEPRESSIVE DISORDER, WITHOUT PSYCHOTIC FEATURES (HCC): Primary | ICD-10-CM

## 2017-11-25 DIAGNOSIS — F10.220 ALCOHOL DEPENDENCE WITH UNCOMPLICATED INTOXICATION (HCC): ICD-10-CM

## 2017-11-25 LAB
6-ACETYL MORPHINE: NEGATIVE
ALBUMIN SERPL-MCNC: 4.5 G/DL (ref 3.5–5)
ALBUMIN/GLOB SERPL: 1.4 G/DL (ref 1.5–2.5)
ALP SERPL-CCNC: 146 U/L (ref 35–104)
ALT SERPL W P-5'-P-CCNC: 58 U/L (ref 10–36)
AMPHET+METHAMPHET UR QL: NEGATIVE
ANION GAP SERPL CALCULATED.3IONS-SCNC: 9 MMOL/L (ref 3.6–11.2)
AST SERPL-CCNC: 80 U/L (ref 10–30)
BARBITURATES UR QL SCN: NEGATIVE
BASOPHILS # BLD AUTO: 0.07 10*3/MM3 (ref 0–0.3)
BASOPHILS NFR BLD AUTO: 0.8 % (ref 0–2)
BENZODIAZ UR QL SCN: NEGATIVE
BILIRUB SERPL-MCNC: 0.3 MG/DL (ref 0.2–1.8)
BILIRUB UR QL STRIP: NEGATIVE
BUN BLD-MCNC: 10 MG/DL (ref 7–21)
BUN/CREAT SERPL: 15.6 (ref 7–25)
BUPRENORPHINE SERPL-MCNC: NEGATIVE NG/ML
CALCIUM SPEC-SCNC: 9.1 MG/DL (ref 7.7–10)
CANNABINOIDS SERPL QL: NEGATIVE
CHLORIDE SERPL-SCNC: 108 MMOL/L (ref 99–112)
CLARITY UR: ABNORMAL
CO2 SERPL-SCNC: 23 MMOL/L (ref 24.3–31.9)
COCAINE UR QL: NEGATIVE
COLOR UR: YELLOW
CREAT BLD-MCNC: 0.64 MG/DL (ref 0.43–1.29)
DEPRECATED RDW RBC AUTO: 42.7 FL (ref 37–54)
EOSINOPHIL # BLD AUTO: 0.2 10*3/MM3 (ref 0–0.7)
EOSINOPHIL NFR BLD AUTO: 2.4 % (ref 0–5)
ERYTHROCYTE [DISTWIDTH] IN BLOOD BY AUTOMATED COUNT: 11.7 % (ref 11.5–14.5)
ETHANOL BLD-MCNC: 211 MG/DL
ETHANOL BLD-MCNC: 370 MG/DL
ETHANOL UR QL: 0.21 %
ETHANOL UR QL: 0.37 %
GFR SERPL CREATININE-BSD FRML MDRD: 98 ML/MIN/1.73
GLOBULIN UR ELPH-MCNC: 3.2 GM/DL
GLUCOSE BLD-MCNC: 98 MG/DL (ref 70–110)
GLUCOSE UR STRIP-MCNC: NEGATIVE MG/DL
HCT VFR BLD AUTO: 46.7 % (ref 37–47)
HGB BLD-MCNC: 15.8 G/DL (ref 12–16)
HGB UR QL STRIP.AUTO: NEGATIVE
IMM GRANULOCYTES # BLD: 0.02 10*3/MM3 (ref 0–0.03)
IMM GRANULOCYTES NFR BLD: 0.2 % (ref 0–0.5)
KETONES UR QL STRIP: NEGATIVE
LEUKOCYTE ESTERASE UR QL STRIP.AUTO: NEGATIVE
LYMPHOCYTES # BLD AUTO: 3.14 10*3/MM3 (ref 1–3)
LYMPHOCYTES NFR BLD AUTO: 37 % (ref 21–51)
MAGNESIUM SERPL-MCNC: 2.3 MG/DL (ref 1.7–2.6)
MCH RBC QN AUTO: 34.3 PG (ref 27–33)
MCHC RBC AUTO-ENTMCNC: 33.8 G/DL (ref 33–37)
MCV RBC AUTO: 101.5 FL (ref 80–94)
METHADONE UR QL SCN: NEGATIVE
MONOCYTES # BLD AUTO: 0.64 10*3/MM3 (ref 0.1–0.9)
MONOCYTES NFR BLD AUTO: 7.5 % (ref 0–10)
NEUTROPHILS # BLD AUTO: 4.41 10*3/MM3 (ref 1.4–6.5)
NEUTROPHILS NFR BLD AUTO: 52.1 % (ref 30–70)
NITRITE UR QL STRIP: NEGATIVE
OPIATES UR QL: NEGATIVE
OSMOLALITY SERPL CALC.SUM OF ELEC: 278.4 MOSM/KG (ref 273–305)
OXYCODONE UR QL SCN: NEGATIVE
PCP UR QL SCN: NEGATIVE
PH UR STRIP.AUTO: 6.5 [PH] (ref 5–8)
PLATELET # BLD AUTO: 281 10*3/MM3 (ref 130–400)
PMV BLD AUTO: 10.3 FL (ref 6–10)
POTASSIUM BLD-SCNC: 4 MMOL/L (ref 3.5–5.3)
PROT SERPL-MCNC: 7.7 G/DL (ref 6–8)
PROT UR QL STRIP: NEGATIVE
RBC # BLD AUTO: 4.6 10*6/MM3 (ref 4.2–5.4)
SODIUM BLD-SCNC: 140 MMOL/L (ref 135–153)
SP GR UR STRIP: 1.01 (ref 1–1.03)
UROBILINOGEN UR QL STRIP: ABNORMAL
WBC NRBC COR # BLD: 8.48 10*3/MM3 (ref 4.5–12.5)

## 2017-11-25 PROCEDURE — 25010000002 MAGNESIUM SULFATE PER 500 MG OF MAGNESIUM: Performed by: PHYSICIAN ASSISTANT

## 2017-11-25 PROCEDURE — 99284 EMERGENCY DEPT VISIT MOD MDM: CPT

## 2017-11-25 PROCEDURE — 96365 THER/PROPH/DIAG IV INF INIT: CPT

## 2017-11-25 PROCEDURE — 80307 DRUG TEST PRSMV CHEM ANLYZR: CPT | Performed by: NURSE PRACTITIONER

## 2017-11-25 PROCEDURE — 80307 DRUG TEST PRSMV CHEM ANLYZR: CPT | Performed by: PHYSICIAN ASSISTANT

## 2017-11-25 PROCEDURE — 83735 ASSAY OF MAGNESIUM: CPT | Performed by: PHYSICIAN ASSISTANT

## 2017-11-25 PROCEDURE — 25010000002 THIAMINE PER 100 MG: Performed by: PHYSICIAN ASSISTANT

## 2017-11-25 PROCEDURE — 80053 COMPREHEN METABOLIC PANEL: CPT | Performed by: PHYSICIAN ASSISTANT

## 2017-11-25 PROCEDURE — 81003 URINALYSIS AUTO W/O SCOPE: CPT | Performed by: PHYSICIAN ASSISTANT

## 2017-11-25 PROCEDURE — 36415 COLL VENOUS BLD VENIPUNCTURE: CPT

## 2017-11-25 PROCEDURE — 85025 COMPLETE CBC W/AUTO DIFF WBC: CPT | Performed by: PHYSICIAN ASSISTANT

## 2017-11-25 RX ORDER — SODIUM CHLORIDE 0.9 % (FLUSH) 0.9 %
10 SYRINGE (ML) INJECTION AS NEEDED
Status: DISCONTINUED | OUTPATIENT
Start: 2017-11-25 | End: 2017-11-26 | Stop reason: HOSPADM

## 2017-11-25 RX ADMIN — THIAMINE HYDROCHLORIDE 1000 ML/HR: 100 INJECTION, SOLUTION INTRAMUSCULAR; INTRAVENOUS at 17:55

## 2017-11-25 NOTE — DISCHARGE INSTRUCTIONS

## 2017-11-25 NOTE — ED PROVIDER NOTES
"Subjective   HPI Comments: 52-year-old female who presents to the ED today for depression.  She states \"my heart is breaking.\"  She states her son  2 years ago in a motor vehicle accident.  This was her only child.  She states that she has had to go to court for 14 months due to the  not pleading guilty.  She states her daughter-in-law will not let her see her grandson.  She denies any suicidal or homicidal ideations.  She states since her son  she has been drinking up height of alcohol a day.  She states her last drink was about 5 hours ago.  She denies any drug use.  She states she has not been eating or sleeping.  She denies any hallucinations but states she has been having nightmares.  She states she currently lives with her boyfriend and he also frequently drinks alcohol.  She states she is not currently on any medications for depression.    Patient is a 52 y.o. female presenting with mental health disorder.   History provided by:  Patient  Mental Health Problem   Presenting symptoms: depression    Presenting symptoms: no hallucinations and no suicidal thoughts    Degree of incapacity (severity):  Severe  Onset quality:  Gradual  Duration: 2 years.  Timing:  Constant  Progression:  Worsening  Chronicity:  Chronic  Context: alcohol use and stressful life event    Context: not drug abuse    Treatment compliance:  Untreated  Relieved by:  Nothing  Worsened by:  Alcohol  Associated symptoms: anhedonia, anxiety, appetite change, feelings of worthlessness, insomnia and poor judgment        Review of Systems   Constitutional: Positive for appetite change.   HENT: Negative.    Eyes: Negative.    Respiratory: Negative.    Cardiovascular: Negative.    Gastrointestinal: Negative.    Genitourinary: Negative.    Musculoskeletal: Negative.    Skin: Negative.    Neurological: Negative.    Psychiatric/Behavioral: Positive for dysphoric mood and sleep disturbance. Negative for hallucinations and suicidal ideas. " The patient is nervous/anxious and has insomnia.    All other systems reviewed and are negative.      Past Medical History:   Diagnosis Date   • Alcohol abuse    • Alcoholism    • Anxiety    • Cancer     Cervical    • COPD (chronic obstructive pulmonary disease)    • Depression    • Elevated liver enzymes    • H/O cardiovascular stress test 2016    WNL   • Hyperlipidemia    • Hypertension    • Insomnia    • Left knee pain    • Lower back pain    • Neck pain    • Psoriasis    • Tobacco abuse    • Withdrawal symptoms, alcohol        No Known Allergies    Past Surgical History:   Procedure Laterality Date   •  SECTION     • HYSTERECTOMY      Total    • TIBIA FRACTURE SURGERY      vicki right leg   • TUMOR REMOVAL      Uterine (x 2)        Family History   Problem Relation Age of Onset   • Coronary artery disease Mother    • Cancer Mother      breast   • Seizures Maternal Aunt        Social History     Social History   • Marital status: Single     Spouse name: N/A   • Number of children: N/A   • Years of education: N/A     Social History Main Topics   • Smoking status: Current Every Day Smoker     Packs/day: 1.50     Years: 15.00     Types: Cigarettes   • Smokeless tobacco: Never Used      Comment: Pt. declines counseling at this time.    • Alcohol use 25.2 oz/week     42 Cans of beer per week      Comment: 0.5 pint - [employer] - Daily    • Drug use: No   • Sexual activity: Defer     Other Topics Concern   • None     Social History Narrative   • None           Objective   Physical Exam   Constitutional: She is oriented to person, place, and time. She appears well-developed and well-nourished. No distress.   HENT:   Head: Normocephalic and atraumatic.   Right Ear: External ear normal.   Left Ear: External ear normal.   Nose: Nose normal.   Mouth/Throat: Oropharynx is clear and moist.   Eyes: Conjunctivae and EOM are normal. Pupils are equal, round, and reactive to light.   Neck: Normal range of  motion. Neck supple.   Cardiovascular: Normal rate, regular rhythm, normal heart sounds and intact distal pulses.    Pulmonary/Chest: Effort normal and breath sounds normal. No respiratory distress.   Abdominal: Soft. Bowel sounds are normal. There is no tenderness.   Musculoskeletal: Normal range of motion.   Neurological: She is alert and oriented to person, place, and time.   Skin: Skin is warm and dry.   Psychiatric: Judgment normal. Her mood appears anxious. Her speech is slurred. She is agitated. Cognition and memory are normal. She exhibits a depressed mood. She expresses no homicidal and no suicidal ideation.   Nursing note and vitals reviewed.      Procedures         ED Course  ED Course   Comment By Time   Discussed with patient about her alcohol level and the approximate wait time for her alcohol level to come down in order to be evaluated for detox/psych. TWAN Rodriguez 11/25 1801   Endorsed to TWAN Mckay NP 11/25 1945   The patient says she wants to go home. She denies SI/HI. She is waiting on  to take her home. It was explained to patient that she can return at any time. Carmen Sprague, APRN 11/25 2009   Patient is uncooperative. She says she is leaving. I asked patient if I could call someone for her, she says she is leaving by herself. I explained to patient that she cannot leave until she has a normal alcohol level or she has a . Carmen Sprague, APRN 11/25 9097                  MDM  Number of Diagnoses or Management Options  Alcohol dependence with uncomplicated intoxication:   Severe episode of recurrent major depressive disorder, without psychotic features:      Amount and/or Complexity of Data Reviewed  Clinical lab tests: reviewed    Patient Progress  Patient progress: stable      Final diagnoses:   Severe episode of recurrent major depressive disorder, without psychotic features   Alcohol dependence with uncomplicated intoxication             Carmen Sprague, APRN  11/26/17 0338

## 2017-11-25 NOTE — ED NOTES
Patient states she drinks alcohol and is depressed. Patient states her son was killed in an MVC 2 years ago and states that she started drinking when that happened. Patient reports she wants help and wants to get better. Patient reports hx of migranes and states she takes goody powder to help with them.   Patient resting quietly on stretcher with no complaints. Pt AAOx4. No respiratory distress noted. Respirations even and unlabored. Pt denies any needs at this time. Skin PWD. Will continue to monitor and follow plan of care. Bed rails up x 2, bed in lowest position, call light within reach.      Alessandra Butts RN  11/25/17 1846

## 2017-11-26 VITALS
OXYGEN SATURATION: 100 % | SYSTOLIC BLOOD PRESSURE: 128 MMHG | TEMPERATURE: 97.6 F | BODY MASS INDEX: 24.92 KG/M2 | HEART RATE: 77 BPM | WEIGHT: 178 LBS | RESPIRATION RATE: 18 BRPM | HEIGHT: 71 IN | DIASTOLIC BLOOD PRESSURE: 74 MMHG

## 2017-11-26 LAB
ETHANOL BLD-MCNC: 90 MG/DL
ETHANOL UR QL: 0.09 %

## 2017-11-26 PROCEDURE — 80307 DRUG TEST PRSMV CHEM ANLYZR: CPT | Performed by: NURSE PRACTITIONER

## 2017-11-26 PROCEDURE — 36415 COLL VENOUS BLD VENIPUNCTURE: CPT

## 2017-11-26 NOTE — ED NOTES
Pt's ride has not shown up to drive her home yet.  Pt is lying in bed and is aware we can't discharge her until her ride is here to sign her out, unless her alcohol level is where is needs to be.     Jessica Berrios RN  11/25/17 5945

## 2017-11-26 NOTE — ED NOTES
Pt states she wants to go home, she has a  on the way to drive her home, Jax DAVILA notified, pt advised to ring the call light when her  arrives and we will get her discharge papers ready.     Jessica Berrios RN  11/25/17 2010

## 2017-11-26 NOTE — ED NOTES
Pt states her  is not coming and she wants to leave now, I have explained to her that we can't allow her to leave if she is intoxicated, which her blood level has shown her to be, the pt states she wants to leave regardless, I have informed Jax DAVILA of the pt's request.     Jessica Berrios RN  11/25/17 8672

## 2018-08-02 ENCOUNTER — HOSPITAL ENCOUNTER (EMERGENCY)
Facility: HOSPITAL | Age: 53
Discharge: PSYCHIATRIC OR SUBSTANCE USE FACILITY | End: 2018-08-03
Attending: EMERGENCY MEDICINE | Admitting: EMERGENCY MEDICINE

## 2018-08-02 VITALS
DIASTOLIC BLOOD PRESSURE: 88 MMHG | BODY MASS INDEX: 24.05 KG/M2 | HEART RATE: 88 BPM | RESPIRATION RATE: 16 BRPM | OXYGEN SATURATION: 100 % | WEIGHT: 168 LBS | SYSTOLIC BLOOD PRESSURE: 142 MMHG | TEMPERATURE: 98.1 F | HEIGHT: 70 IN

## 2018-08-02 DIAGNOSIS — N39.0 UTI (URINARY TRACT INFECTION), UNCOMPLICATED: ICD-10-CM

## 2018-08-02 DIAGNOSIS — F10.220 ALCOHOL DEPENDENCE WITH UNCOMPLICATED INTOXICATION (HCC): Primary | ICD-10-CM

## 2018-08-02 LAB
6-ACETYL MORPHINE: NEGATIVE
ALBUMIN SERPL-MCNC: 4.6 G/DL (ref 3.5–5)
ALBUMIN/GLOB SERPL: 1.5 G/DL (ref 1.5–2.5)
ALP SERPL-CCNC: 117 U/L (ref 35–104)
ALT SERPL W P-5'-P-CCNC: 94 U/L (ref 10–36)
AMPHET+METHAMPHET UR QL: NEGATIVE
ANION GAP SERPL CALCULATED.3IONS-SCNC: 7.5 MMOL/L (ref 3.6–11.2)
AST SERPL-CCNC: 175 U/L (ref 10–30)
BACTERIA UR QL AUTO: ABNORMAL /HPF
BARBITURATES UR QL SCN: NEGATIVE
BASOPHILS # BLD AUTO: 0.02 10*3/MM3 (ref 0–0.3)
BASOPHILS NFR BLD AUTO: 0.5 % (ref 0–2)
BENZODIAZ UR QL SCN: NEGATIVE
BILIRUB SERPL-MCNC: 0.6 MG/DL (ref 0.2–1.8)
BILIRUB UR QL STRIP: ABNORMAL
BUN BLD-MCNC: 6 MG/DL (ref 7–21)
BUN/CREAT SERPL: 10.3 (ref 7–25)
BUPRENORPHINE SERPL-MCNC: NEGATIVE NG/ML
CALCIUM SPEC-SCNC: 9.2 MG/DL (ref 7.7–10)
CANNABINOIDS SERPL QL: NEGATIVE
CHLORIDE SERPL-SCNC: 103 MMOL/L (ref 99–112)
CLARITY UR: ABNORMAL
CO2 SERPL-SCNC: 24.5 MMOL/L (ref 24.3–31.9)
COCAINE UR QL: NEGATIVE
COLOR UR: ABNORMAL
CREAT BLD-MCNC: 0.58 MG/DL (ref 0.43–1.29)
DEPRECATED RDW RBC AUTO: 43.5 FL (ref 37–54)
EOSINOPHIL # BLD AUTO: 0.08 10*3/MM3 (ref 0–0.7)
EOSINOPHIL NFR BLD AUTO: 1.9 % (ref 0–5)
ERYTHROCYTE [DISTWIDTH] IN BLOOD BY AUTOMATED COUNT: 11.6 % (ref 11.5–14.5)
ETHANOL BLD-MCNC: 277 MG/DL
ETHANOL BLD-MCNC: 42 MG/DL
ETHANOL UR QL: 0.04 %
ETHANOL UR QL: 0.28 %
GFR SERPL CREATININE-BSD FRML MDRD: 110 ML/MIN/1.73
GLOBULIN UR ELPH-MCNC: 3.1 GM/DL
GLUCOSE BLD-MCNC: 91 MG/DL (ref 70–110)
GLUCOSE UR STRIP-MCNC: NEGATIVE MG/DL
HCT VFR BLD AUTO: 41.5 % (ref 37–47)
HGB BLD-MCNC: 14.3 G/DL (ref 12–16)
HGB UR QL STRIP.AUTO: NEGATIVE
HYALINE CASTS UR QL AUTO: ABNORMAL /LPF
IMM GRANULOCYTES # BLD: 0.01 10*3/MM3 (ref 0–0.03)
IMM GRANULOCYTES NFR BLD: 0.2 % (ref 0–0.5)
KETONES UR QL STRIP: ABNORMAL
LEUKOCYTE ESTERASE UR QL STRIP.AUTO: ABNORMAL
LYMPHOCYTES # BLD AUTO: 1.69 10*3/MM3 (ref 1–3)
LYMPHOCYTES NFR BLD AUTO: 39.1 % (ref 21–51)
MAGNESIUM SERPL-MCNC: 1.9 MG/DL (ref 1.7–2.6)
MCH RBC QN AUTO: 35.6 PG (ref 27–33)
MCHC RBC AUTO-ENTMCNC: 34.5 G/DL (ref 33–37)
MCV RBC AUTO: 103.2 FL (ref 80–94)
METHADONE UR QL SCN: NEGATIVE
MONOCYTES # BLD AUTO: 0.56 10*3/MM3 (ref 0.1–0.9)
MONOCYTES NFR BLD AUTO: 13 % (ref 0–10)
NEUTROPHILS # BLD AUTO: 1.96 10*3/MM3 (ref 1.4–6.5)
NEUTROPHILS NFR BLD AUTO: 45.3 % (ref 30–70)
NITRITE UR QL STRIP: POSITIVE
OPIATES UR QL: NEGATIVE
OSMOLALITY SERPL CALC.SUM OF ELEC: 267.3 MOSM/KG (ref 273–305)
OXYCODONE UR QL SCN: NEGATIVE
PCP UR QL SCN: NEGATIVE
PH UR STRIP.AUTO: <=5 [PH] (ref 5–8)
PLATELET # BLD AUTO: 147 10*3/MM3 (ref 130–400)
PMV BLD AUTO: 10.5 FL (ref 6–10)
POTASSIUM BLD-SCNC: 3.8 MMOL/L (ref 3.5–5.3)
PROT SERPL-MCNC: 7.7 G/DL (ref 6–8)
PROT UR QL STRIP: NEGATIVE
RBC # BLD AUTO: 4.02 10*6/MM3 (ref 4.2–5.4)
RBC # UR: ABNORMAL /HPF
REF LAB TEST METHOD: ABNORMAL
SODIUM BLD-SCNC: 135 MMOL/L (ref 135–153)
SP GR UR STRIP: 1.02 (ref 1–1.03)
SQUAMOUS #/AREA URNS HPF: ABNORMAL /HPF
UROBILINOGEN UR QL STRIP: ABNORMAL
WBC NRBC COR # BLD: 4.32 10*3/MM3 (ref 4.5–12.5)
WBC UR QL AUTO: ABNORMAL /HPF

## 2018-08-02 PROCEDURE — 80307 DRUG TEST PRSMV CHEM ANLYZR: CPT | Performed by: PHYSICIAN ASSISTANT

## 2018-08-02 PROCEDURE — 96375 TX/PRO/DX INJ NEW DRUG ADDON: CPT

## 2018-08-02 PROCEDURE — 80053 COMPREHEN METABOLIC PANEL: CPT | Performed by: PHYSICIAN ASSISTANT

## 2018-08-02 PROCEDURE — 36415 COLL VENOUS BLD VENIPUNCTURE: CPT

## 2018-08-02 PROCEDURE — 96367 TX/PROPH/DG ADDL SEQ IV INF: CPT

## 2018-08-02 PROCEDURE — 81001 URINALYSIS AUTO W/SCOPE: CPT | Performed by: PHYSICIAN ASSISTANT

## 2018-08-02 PROCEDURE — 25010000002 MAGNESIUM SULFATE PER 500 MG OF MAGNESIUM: Performed by: PHYSICIAN ASSISTANT

## 2018-08-02 PROCEDURE — 25010000002 KETOROLAC TROMETHAMINE PER 15 MG: Performed by: PHYSICIAN ASSISTANT

## 2018-08-02 PROCEDURE — 25010000002 CEFTRIAXONE: Performed by: PHYSICIAN ASSISTANT

## 2018-08-02 PROCEDURE — 83735 ASSAY OF MAGNESIUM: CPT | Performed by: PHYSICIAN ASSISTANT

## 2018-08-02 PROCEDURE — 25010000002 THIAMINE PER 100 MG: Performed by: PHYSICIAN ASSISTANT

## 2018-08-02 PROCEDURE — 85025 COMPLETE CBC W/AUTO DIFF WBC: CPT | Performed by: PHYSICIAN ASSISTANT

## 2018-08-02 PROCEDURE — 96365 THER/PROPH/DIAG IV INF INIT: CPT

## 2018-08-02 PROCEDURE — 99285 EMERGENCY DEPT VISIT HI MDM: CPT

## 2018-08-02 RX ORDER — ACETAMINOPHEN 500 MG
TABLET ORAL
Status: COMPLETED
Start: 2018-08-02 | End: 2018-08-02

## 2018-08-02 RX ORDER — KETOROLAC TROMETHAMINE 30 MG/ML
30 INJECTION, SOLUTION INTRAMUSCULAR; INTRAVENOUS ONCE
Status: COMPLETED | OUTPATIENT
Start: 2018-08-02 | End: 2018-08-02

## 2018-08-02 RX ORDER — ACETAMINOPHEN 500 MG
1000 TABLET ORAL ONCE
Status: COMPLETED | OUTPATIENT
Start: 2018-08-02 | End: 2018-08-02

## 2018-08-02 RX ORDER — SODIUM CHLORIDE 0.9 % (FLUSH) 0.9 %
10 SYRINGE (ML) INJECTION AS NEEDED
Status: DISCONTINUED | OUTPATIENT
Start: 2018-08-02 | End: 2018-08-03 | Stop reason: HOSPADM

## 2018-08-02 RX ADMIN — KETOROLAC TROMETHAMINE 30 MG: 30 INJECTION, SOLUTION INTRAMUSCULAR; INTRAVENOUS at 18:16

## 2018-08-02 RX ADMIN — THIAMINE HYDROCHLORIDE 1000 ML/HR: 100 INJECTION, SOLUTION INTRAMUSCULAR; INTRAVENOUS at 15:01

## 2018-08-02 RX ADMIN — CEFTRIAXONE 1 G: 1 INJECTION, POWDER, FOR SOLUTION INTRAMUSCULAR; INTRAVENOUS at 18:16

## 2018-08-02 RX ADMIN — ACETAMINOPHEN 1000 MG: 500 TABLET ORAL at 21:44

## 2018-08-02 RX ADMIN — Medication 1000 MG: at 21:44

## 2018-08-02 NOTE — ED NOTES
Pt eating regular diet, watching tv, nadn. Fall precautions maintained.     Leigh De La Torre, JEMAL  08/02/18 3148
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
## 2018-08-03 ENCOUNTER — HOSPITAL ENCOUNTER (INPATIENT)
Facility: HOSPITAL | Age: 53
LOS: 4 days | Discharge: HOME | End: 2018-08-07
Attending: PSYCHIATRY & NEUROLOGY | Admitting: PSYCHIATRY & NEUROLOGY

## 2018-08-03 PROBLEM — F10.20 ETOH DEPENDENCE (HCC): Status: ACTIVE | Noted: 2018-08-03

## 2018-08-03 PROCEDURE — 93005 ELECTROCARDIOGRAM TRACING: CPT | Performed by: PSYCHIATRY & NEUROLOGY

## 2018-08-03 PROCEDURE — 99222 1ST HOSP IP/OBS MODERATE 55: CPT | Performed by: PSYCHIATRY & NEUROLOGY

## 2018-08-03 PROCEDURE — 93010 ELECTROCARDIOGRAM REPORT: CPT | Performed by: INTERNAL MEDICINE

## 2018-08-03 PROCEDURE — HZ2ZZZZ DETOXIFICATION SERVICES FOR SUBSTANCE ABUSE TREATMENT: ICD-10-PCS | Performed by: PSYCHIATRY & NEUROLOGY

## 2018-08-03 RX ORDER — FAMOTIDINE 20 MG/1
20 TABLET, FILM COATED ORAL 2 TIMES DAILY PRN
Status: DISCONTINUED | OUTPATIENT
Start: 2018-08-03 | End: 2018-08-07 | Stop reason: HOSPADM

## 2018-08-03 RX ORDER — LORAZEPAM 1 MG/1
1 TABLET ORAL EVERY 4 HOURS PRN
Status: ACTIVE | OUTPATIENT
Start: 2018-08-06 | End: 2018-08-07

## 2018-08-03 RX ORDER — LORAZEPAM 0.5 MG/1
0.5 TABLET ORAL
Status: COMPLETED | OUTPATIENT
Start: 2018-08-06 | End: 2018-08-06

## 2018-08-03 RX ORDER — NICOTINE 21 MG/24HR
1 PATCH, TRANSDERMAL 24 HOURS TRANSDERMAL EVERY 24 HOURS
Status: DISCONTINUED | OUTPATIENT
Start: 2018-08-03 | End: 2018-08-07 | Stop reason: HOSPADM

## 2018-08-03 RX ORDER — ONDANSETRON 4 MG/1
4 TABLET, FILM COATED ORAL EVERY 6 HOURS PRN
Status: DISCONTINUED | OUTPATIENT
Start: 2018-08-03 | End: 2018-08-07 | Stop reason: HOSPADM

## 2018-08-03 RX ORDER — IBUPROFEN 800 MG/1
800 TABLET ORAL EVERY 8 HOURS PRN
COMMUNITY
End: 2021-07-12 | Stop reason: SDUPTHER

## 2018-08-03 RX ORDER — ALUMINA, MAGNESIA, AND SIMETHICONE 2400; 2400; 240 MG/30ML; MG/30ML; MG/30ML
15 SUSPENSION ORAL EVERY 6 HOURS PRN
Status: DISCONTINUED | OUTPATIENT
Start: 2018-08-03 | End: 2018-08-07 | Stop reason: HOSPADM

## 2018-08-03 RX ORDER — BENZONATATE 100 MG/1
100 CAPSULE ORAL 3 TIMES DAILY PRN
Status: DISCONTINUED | OUTPATIENT
Start: 2018-08-03 | End: 2018-08-07 | Stop reason: HOSPADM

## 2018-08-03 RX ORDER — LORAZEPAM 1 MG/1
1 TABLET ORAL
Status: COMPLETED | OUTPATIENT
Start: 2018-08-05 | End: 2018-08-05

## 2018-08-03 RX ORDER — ACETAMINOPHEN 325 MG/1
650 TABLET ORAL EVERY 4 HOURS PRN
Status: DISCONTINUED | OUTPATIENT
Start: 2018-08-03 | End: 2018-08-03

## 2018-08-03 RX ORDER — ACETAMINOPHEN 325 MG/1
650 TABLET ORAL EVERY 6 HOURS PRN
COMMUNITY
End: 2021-07-12

## 2018-08-03 RX ORDER — TRAZODONE HYDROCHLORIDE 50 MG/1
50 TABLET ORAL NIGHTLY PRN
Status: DISCONTINUED | OUTPATIENT
Start: 2018-08-03 | End: 2018-08-05

## 2018-08-03 RX ORDER — BENZTROPINE MESYLATE 1 MG/1
1 TABLET ORAL DAILY PRN
Status: DISCONTINUED | OUTPATIENT
Start: 2018-08-03 | End: 2018-08-07 | Stop reason: HOSPADM

## 2018-08-03 RX ORDER — BENZTROPINE MESYLATE 1 MG/ML
0.5 INJECTION INTRAMUSCULAR; INTRAVENOUS DAILY PRN
Status: DISCONTINUED | OUTPATIENT
Start: 2018-08-03 | End: 2018-08-07 | Stop reason: HOSPADM

## 2018-08-03 RX ORDER — IBUPROFEN 600 MG/1
600 TABLET ORAL EVERY 4 HOURS PRN
Status: DISCONTINUED | OUTPATIENT
Start: 2018-08-03 | End: 2018-08-07 | Stop reason: HOSPADM

## 2018-08-03 RX ORDER — IBUPROFEN 400 MG/1
800 TABLET ORAL EVERY 8 HOURS PRN
Status: CANCELLED | OUTPATIENT
Start: 2018-08-03

## 2018-08-03 RX ORDER — THIAMINE HCL 50 MG
100 TABLET ORAL DAILY
Status: DISCONTINUED | OUTPATIENT
Start: 2018-08-03 | End: 2018-08-07 | Stop reason: HOSPADM

## 2018-08-03 RX ORDER — LORAZEPAM 0.5 MG/1
0.5 TABLET ORAL EVERY 4 HOURS PRN
Status: DISCONTINUED | OUTPATIENT
Start: 2018-08-07 | End: 2018-08-07 | Stop reason: HOSPADM

## 2018-08-03 RX ORDER — LORAZEPAM 2 MG/1
2 TABLET ORAL
Status: COMPLETED | OUTPATIENT
Start: 2018-08-03 | End: 2018-08-03

## 2018-08-03 RX ORDER — LORAZEPAM 2 MG/1
2 TABLET ORAL
Status: DISPENSED | OUTPATIENT
Start: 2018-08-03 | End: 2018-08-04

## 2018-08-03 RX ORDER — ECHINACEA PURPUREA EXTRACT 125 MG
2 TABLET ORAL AS NEEDED
Status: DISCONTINUED | OUTPATIENT
Start: 2018-08-03 | End: 2018-08-07 | Stop reason: HOSPADM

## 2018-08-03 RX ORDER — LOPERAMIDE HYDROCHLORIDE 2 MG/1
2 CAPSULE ORAL 4 TIMES DAILY PRN
Status: DISCONTINUED | OUTPATIENT
Start: 2018-08-03 | End: 2018-08-07 | Stop reason: HOSPADM

## 2018-08-03 RX ORDER — HYDROXYZINE 50 MG/1
50 TABLET, FILM COATED ORAL EVERY 6 HOURS PRN
Status: DISCONTINUED | OUTPATIENT
Start: 2018-08-03 | End: 2018-08-07 | Stop reason: HOSPADM

## 2018-08-03 RX ORDER — LORAZEPAM 2 MG/1
2 TABLET ORAL EVERY 4 HOURS PRN
Status: ACTIVE | OUTPATIENT
Start: 2018-08-04 | End: 2018-08-05

## 2018-08-03 RX ORDER — ACETAMINOPHEN 325 MG/1
650 TABLET ORAL EVERY 6 HOURS PRN
Status: CANCELLED | OUTPATIENT
Start: 2018-08-03

## 2018-08-03 RX ADMIN — LORAZEPAM 2 MG: 2 TABLET ORAL at 14:16

## 2018-08-03 RX ADMIN — LORAZEPAM 2 MG: 2 TABLET ORAL at 01:44

## 2018-08-03 RX ADMIN — TRAZODONE HYDROCHLORIDE 50 MG: 50 TABLET ORAL at 01:43

## 2018-08-03 RX ADMIN — THIAMINE HCL (VITAMIN B1) 50 MG TABLET 100 MG: at 08:53

## 2018-08-03 RX ADMIN — IBUPROFEN 600 MG: 600 TABLET ORAL at 08:53

## 2018-08-03 RX ADMIN — LORAZEPAM 2 MG: 2 TABLET ORAL at 08:53

## 2018-08-03 RX ADMIN — TRAZODONE HYDROCHLORIDE 50 MG: 50 TABLET ORAL at 21:24

## 2018-08-03 RX ADMIN — HYDROXYZINE HYDROCHLORIDE 50 MG: 50 TABLET ORAL at 17:13

## 2018-08-03 RX ADMIN — LORAZEPAM 2 MG: 2 TABLET ORAL at 21:24

## 2018-08-03 RX ADMIN — IBUPROFEN 600 MG: 600 TABLET ORAL at 21:24

## 2018-08-03 RX ADMIN — IBUPROFEN 600 MG: 600 TABLET ORAL at 01:43

## 2018-08-03 NOTE — PLAN OF CARE
Problem: Patient Care Overview  Goal: Plan of Care Review  Outcome: Ongoing (interventions implemented as appropriate)   08/03/18 0555   Coping/Psychosocial   Plan of Care Reviewed With patient   Coping/Psychosocial   Patient Agreement with Plan of Care agrees   Plan of Care Review   Progress no change   OTHER   Outcome Summary New admit for this shift. Slept well through night. 8/3/18 0556       Problem: Overarching Goals (Adult)  Goal: Adheres to Safety Considerations for Self and Others  Outcome: Ongoing (interventions implemented as appropriate)    Goal: Optimized Coping Skills in Response to Life Stressors  Outcome: Ongoing (interventions implemented as appropriate)    Goal: Develops/Participates in Therapeutic [place] to Support Successful Transition  Outcome: Ongoing (interventions implemented as appropriate)
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
## 2018-08-03 NOTE — ED NOTES
Patient lying in bed resting at this time. NADN. WCTM. Resps even and unlabored. Skin warm and dry to touch. Aware of POC. Noted to be watching television and laughing. Updated on POC     Vandana Austin RN  08/02/18 8114

## 2018-08-03 NOTE — PLAN OF CARE
Problem: Patient Care Overview  Goal: Plan of Care Review  Outcome: Ongoing (interventions implemented as appropriate)   08/03/18 1428   Coping/Psychosocial   Plan of Care Reviewed With patient   Coping/Psychosocial   Patient Agreement with Plan of Care agrees   Plan of Care Review   Progress no change   OTHER   Outcome Summary Patient participates in dayroom activities.

## 2018-08-03 NOTE — ED NOTES
Report gave to Marylou JOAQUIN in intake at this time. Discussed ethol level as well as vitals. No complaints of SI or HI.      Vandana Austin RN  08/02/18 9486
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
## 2018-08-04 PROCEDURE — 99232 SBSQ HOSP IP/OBS MODERATE 35: CPT | Performed by: PSYCHIATRY & NEUROLOGY

## 2018-08-04 RX ADMIN — HYDROXYZINE HYDROCHLORIDE 50 MG: 50 TABLET ORAL at 08:05

## 2018-08-04 RX ADMIN — LORAZEPAM 1.5 MG: 1 TABLET ORAL at 21:47

## 2018-08-04 RX ADMIN — THIAMINE HCL (VITAMIN B1) 50 MG TABLET 100 MG: at 08:05

## 2018-08-04 RX ADMIN — IBUPROFEN 600 MG: 600 TABLET ORAL at 21:46

## 2018-08-04 RX ADMIN — LORAZEPAM 1.5 MG: 1 TABLET ORAL at 08:06

## 2018-08-04 RX ADMIN — HYDROXYZINE HYDROCHLORIDE 50 MG: 50 TABLET ORAL at 14:02

## 2018-08-04 RX ADMIN — HYDROXYZINE HYDROCHLORIDE 50 MG: 50 TABLET ORAL at 21:46

## 2018-08-04 RX ADMIN — LORAZEPAM 1.5 MG: 1 TABLET ORAL at 14:02

## 2018-08-04 RX ADMIN — IBUPROFEN 600 MG: 600 TABLET ORAL at 08:05

## 2018-08-04 RX ADMIN — TRAZODONE HYDROCHLORIDE 50 MG: 50 TABLET ORAL at 21:47

## 2018-08-04 RX ADMIN — LOPERAMIDE HYDROCHLORIDE 2 MG: 2 CAPSULE ORAL at 18:12

## 2018-08-04 NOTE — PROGRESS NOTES
"    Detox Recovery Unit Progress Note   Clinician: Jason Acuna MD  Admission Date: 8/3/2018  10:48 AM 08/04/18    Behavioral Health Treatment Plan and Problem List: I have reviewed and approved the Behavioral Health Treatment Plan and Problem list.    Allergies  No Known Allergies    Hospital Day: 1 day      Assessment completed within view of staff    History  CC: Detox Recovery Unit followup note  Interval HPI: Patient seen and evaluated by me.  Chart reviewed. Patient is withdrawing from alcohol.  Current withdrawal symptoms include: anxiety, tremor, insomnia, sweating, cold chills.  No disturbance of sensorium.  No psychosis.    ROS otherwise as below.        Interval Review of Systems:   General ROS: negative for - fever.  Positive for withdrawal symptoms mentioned above.  Endocrine ROS: negative for - palpitations  Respiratory ROS: no cough, shortness of breath, or wheezing  Cardiovascular ROS: no chest pain or dyspnea on exertion  Gastrointestinal ROS: no abdominal pain,no black or bloody stools    /94 (BP Location: Right arm, Patient Position: Sitting)   Pulse 98   Temp 96.8 °F (36 °C) (Temporal Artery )   Resp 18   Ht 180.3 cm (71\")   Wt 76.4 kg (168 lb 6.4 oz)   SpO2 98%   Breastfeeding? No   BMI 23.49 kg/m²     Mental Status Exam  Mood: anxious  Affect: dysphoric   Thought Processes: linear, logical, and goal directed  Thought Content:  sensorium intact  Oriented X3:  yes  Suicidal Thoughts: denies        Medical Decision Making:   Labs:     Lab Results (last 24 hours)     ** No results found for the last 24 hours. **            Radiology:     Imaging Results (last 24 hours)     ** No results found for the last 24 hours. **            EKG:     ECG/EMG Results (most recent)     Procedure Component Value Units Date/Time    ECG 12 Lead [[id number]] Collected:  08/03/18 0145     Updated:  08/03/18 1015    Narrative:       Test Reason : Potential adverse reaction to medications.  Blood Pressure " : **/** mmHG  Vent. Rate : 059 BPM     Atrial Rate : 059 BPM     P-R Int : 134 ms          QRS Dur : 086 ms      QT Int : 426 ms       P-R-T Axes : 010 055 017 degrees     QTc Int : 421 ms    Sinus bradycardia  Otherwise normal ECG  No previous ECGs available  Confirmed by Anthony Allen ([id number]) on 8/3/2018 10:15:30 AM    Referred By:  TIMUR           Confirmed By:Anthony Allen           Medications:     LORazepam 1.5 mg Oral 3 times per day   Followed by      [START ON 8/5/2018] LORazepam 1 mg Oral 3 times per day   Followed by      [START ON 8/6/2018] LORazepam 0.5 mg Oral 3 times per day   nicotine 1 patch Transdermal Q24H   vitamin B-1 100 mg Oral Daily          All medications reviewed.      Assessment and Plan:   Active Problems:    EtOH dependence (CMS/HCC)        -  Continue Ativan detox protocol.  Continue thiamine 100mg Daily.      Continue hospitalization for medical management of drug withdrawal and patient safety and stabilization.  Continue individual and group chemical dependency counseling.   Therapist and treatment team will continue to assist patient in establishing plans for follow-up and rehabilitation that will serve as the next step in care once patient has completed the medical detox.

## 2018-08-04 NOTE — PLAN OF CARE
Problem: Patient Care Overview  Goal: Plan of Care Review  Outcome: Ongoing (interventions implemented as appropriate)   08/03/18 2056   Coping/Psychosocial   Plan of Care Reviewed With patient   Coping/Psychosocial   Patient Agreement with Plan of Care agrees   Plan of Care Review   Progress improving   OTHER   Outcome Summary Patient calm and cooperative. Rates anxiety 4. Denies depression, cravings, or wd's. No problems noted. Will continue to monitor.       Problem: Overarching Goals (Adult)  Goal: Adheres to Safety Considerations for Self and Others  Outcome: Ongoing (interventions implemented as appropriate)    Goal: Optimized Coping Skills in Response to Life Stressors  Outcome: Ongoing (interventions implemented as appropriate)    Goal: Develops/Participates in Therapeutic [place] to Support Successful Transition  Outcome: Ongoing (interventions implemented as appropriate)

## 2018-08-05 PROCEDURE — 99232 SBSQ HOSP IP/OBS MODERATE 35: CPT | Performed by: PSYCHIATRY & NEUROLOGY

## 2018-08-05 RX ORDER — TRAZODONE HYDROCHLORIDE 50 MG/1
100 TABLET ORAL NIGHTLY PRN
Status: DISCONTINUED | OUTPATIENT
Start: 2018-08-05 | End: 2018-08-07 | Stop reason: HOSPADM

## 2018-08-05 RX ADMIN — THIAMINE HCL (VITAMIN B1) 50 MG TABLET 100 MG: at 08:49

## 2018-08-05 RX ADMIN — TRAZODONE HYDROCHLORIDE 100 MG: 50 TABLET ORAL at 21:27

## 2018-08-05 RX ADMIN — IBUPROFEN 600 MG: 600 TABLET ORAL at 21:27

## 2018-08-05 RX ADMIN — IBUPROFEN 600 MG: 600 TABLET ORAL at 12:40

## 2018-08-05 RX ADMIN — LORAZEPAM 1 MG: 1 TABLET ORAL at 21:27

## 2018-08-05 RX ADMIN — HYDROXYZINE HYDROCHLORIDE 50 MG: 50 TABLET ORAL at 21:27

## 2018-08-05 RX ADMIN — IBUPROFEN 600 MG: 600 TABLET ORAL at 08:48

## 2018-08-05 RX ADMIN — LORAZEPAM 1 MG: 1 TABLET ORAL at 08:48

## 2018-08-05 RX ADMIN — BENZONATATE 100 MG: 100 CAPSULE, LIQUID FILLED ORAL at 21:27

## 2018-08-05 RX ADMIN — LORAZEPAM 1 MG: 1 TABLET ORAL at 14:54

## 2018-08-05 NOTE — PLAN OF CARE
Problem: Patient Care Overview  Goal: Plan of Care Review  Outcome: Ongoing (interventions implemented as appropriate)   18 [id number]   Coping/Psychosocial   Plan of Care Reviewed With patient   Coping/Psychosocial   Patient Agreement with Plan of Care agrees   Plan of Care Review   Progress improving   OTHER   Outcome Summary Patient tearful during shift assessment talking about her son that  in a MVA in . Patient rates anxiety, depression, and cravings 10/10. Denies street. Wd's nausea, diarrhea, stomach cramps, and restlessness. Will continue to monitor.       Problem: Overarching Goals (Adult)  Goal: Adheres to Safety Considerations for Self and Others  Outcome: Ongoing (interventions implemented as appropriate)    Goal: Optimized Coping Skills in Response to Life Stressors  Outcome: Ongoing (interventions implemented as appropriate)    Goal: Develops/Participates in Therapeutic [place] to Support Successful Transition  Outcome: Ongoing (interventions implemented as appropriate)

## 2018-08-05 NOTE — PLAN OF CARE
Problem: Patient Care Overview  Goal: Plan of Care Review  Outcome: Ongoing (interventions implemented as appropriate)   08/05/18 1836   Coping/Psychosocial   Plan of Care Reviewed With patient   Coping/Psychosocial   Patient Agreement with Plan of Care agrees   Plan of Care Review   Progress improving       Problem: Overarching Goals (Adult)  Goal: Adheres to Safety Considerations for Self and Others  Outcome: Ongoing (interventions implemented as appropriate)   08/05/18 1836   Overarching Goals (Adult)   Adheres to Safety Considerations for Self and Others making progress toward outcome     Goal: Optimized Coping Skills in Response to Life Stressors  Outcome: Ongoing (interventions implemented as appropriate)   08/05/18 1836   Overarching Goals (Adult)   Optimized Coping Skills in Response to Life Stressors making progress toward outcome     Goal: Develops/Participates in Therapeutic [place] to Support Successful Transition  Outcome: Ongoing (interventions implemented as appropriate)   08/05/18 1836   Overarching Goals (Adult)   Develops/Participates in Therapeutic [place] to Support Successful Transition making progress toward outcome

## 2018-08-05 NOTE — PROGRESS NOTES
"    Detox Recovery Unit Progress Note   Clinician: Jason Acuna MD  Admission Date: 8/3/2018  8:51 AM 08/05/18    Behavioral Health Treatment Plan and Problem List: I have reviewed and approved the Behavioral Health Treatment Plan and Problem list.    Allergies  No Known Allergies    Hospital Day: 2 days      Assessment completed within view of staff    History  CC: Detox Recovery Unit followup note  Interval HPI: Patient seen and evaluated by me.  Chart reviewed. Patient is withdrawing from alcohol  Current withdrawal symptoms include: body aches, fatigue, insomnia, sweating, cold chills.   Sensorium intact.  ROS otherwise as below.        Interval Review of Systems:   General ROS: negative for - fever.  Positive for withdrawal symptoms mentioned above.  Endocrine ROS: negative for - palpitations  Respiratory ROS: no cough, shortness of breath, or wheezing  Cardiovascular ROS: no chest pain or dyspnea on exertion  Gastrointestinal ROS: no abdominal pain,no black or bloody stools    /79 (BP Location: Right arm, Patient Position: Lying)   Pulse 69   Temp 97.1 °F (36.2 °C) (Temporal Artery )   Resp 16   Ht 180.3 cm (71\")   Wt 76.4 kg (168 lb 6.4 oz)   SpO2 97%   Breastfeeding? No   BMI 23.49 kg/m²     Mental Status Exam  Mood: anxious  Affect: dysphoric   Thought Processes: linear, logical, and goal directed  Thought Content:  sensorium intact  Oriented X3:  yes  Suicidal Thoughts: denies        Medical Decision Making:   Labs:     Lab Results (last 24 hours)     ** No results found for the last 24 hours. **            Radiology:     Imaging Results (last 24 hours)     ** No results found for the last 24 hours. **            EKG:     ECG/EMG Results (most recent)     Procedure Component Value Units Date/Time    ECG 12 Lead [[id number]] Collected:  08/03/18 0145     Updated:  08/03/18 1015    Narrative:       Test Reason : Potential adverse reaction to medications.  Blood Pressure : **/** mmHG  Vent. " Rate : 059 BPM     Atrial Rate : 059 BPM     P-R Int : 134 ms          QRS Dur : 086 ms      QT Int : 426 ms       P-R-T Axes : 010 055 017 degrees     QTc Int : 421 ms    Sinus bradycardia  Otherwise normal ECG  No previous ECGs available  Confirmed by Anthony Allen ([id number]) on 8/3/2018 10:15:30 AM    Referred By:  TIMUR           Confirmed By:Anthony Allen           Medications:     LORazepam 1 mg Oral 3 times per day   Followed by      [START ON 8/6/2018] LORazepam 0.5 mg Oral 3 times per day   nicotine 1 patch Transdermal Q24H   vitamin B-1 100 mg Oral Daily          All medications reviewed.      Assessment and Plan:   Active Problems:    EtOH dependence (CMS/HCC)      - Continue Ativan detox      - Continue thiamine 100mg Daily    Insomnia   - Increase Trazodone to 100mg PRN      Continue hospitalization for medical management of drug withdrawal and patient safety and stabilization.  Continue individual and group chemical dependency counseling.   Therapist and treatment team will continue to assist patient in establishing plans for follow-up and rehabilitation that will serve as the next step in care once patient has completed the medical detox.

## 2018-08-06 PROCEDURE — 99232 SBSQ HOSP IP/OBS MODERATE 35: CPT | Performed by: PSYCHIATRY & NEUROLOGY

## 2018-08-06 RX ADMIN — THIAMINE HCL (VITAMIN B1) 50 MG TABLET 100 MG: at 08:39

## 2018-08-06 RX ADMIN — HYDROXYZINE HYDROCHLORIDE 50 MG: 50 TABLET ORAL at 21:08

## 2018-08-06 RX ADMIN — HYDROXYZINE HYDROCHLORIDE 50 MG: 50 TABLET ORAL at 10:05

## 2018-08-06 RX ADMIN — LORAZEPAM 0.5 MG: 0.5 TABLET ORAL at 08:39

## 2018-08-06 RX ADMIN — IBUPROFEN 600 MG: 600 TABLET ORAL at 21:08

## 2018-08-06 RX ADMIN — LORAZEPAM 0.5 MG: 0.5 TABLET ORAL at 14:08

## 2018-08-06 RX ADMIN — TRAZODONE HYDROCHLORIDE 100 MG: 50 TABLET ORAL at 21:08

## 2018-08-06 RX ADMIN — IBUPROFEN 600 MG: 600 TABLET ORAL at 14:09

## 2018-08-06 RX ADMIN — LORAZEPAM 0.5 MG: 0.5 TABLET ORAL at 21:08

## 2018-08-06 NOTE — PLAN OF CARE
Problem: Patient Care Overview  Goal: Plan of Care Review  Outcome: Ongoing (interventions implemented as appropriate)   08/06/18 1819   Coping/Psychosocial   Plan of Care Reviewed With patient   Coping/Psychosocial   Patient Agreement with Plan of Care agrees   Plan of Care Review   Progress improving       Problem: Overarching Goals (Adult)  Goal: Adheres to Safety Considerations for Self and Others  Outcome: Ongoing (interventions implemented as appropriate)   08/06/18 1819   Overarching Goals (Adult)   Adheres to Safety Considerations for Self and Others making progress toward outcome     Goal: Optimized Coping Skills in Response to Life Stressors  Outcome: Ongoing (interventions implemented as appropriate)   08/06/18 1819   Overarching Goals (Adult)   Optimized Coping Skills in Response to Life Stressors making progress toward outcome     Goal: Develops/Participates in Therapeutic [place] to Support Successful Transition  Outcome: Ongoing (interventions implemented as appropriate)   08/06/18 1819   Overarching Goals (Adult)   Develops/Participates in Therapeutic [place] to Support Successful Transition making progress toward outcome

## 2018-08-06 NOTE — PLAN OF CARE
Problem: Patient Care Overview  Goal: Plan of Care Review  Outcome: Ongoing (interventions implemented as appropriate)   08/05/18 3486   Coping/Psychosocial   Plan of Care Reviewed With patient   Coping/Psychosocial   Patient Agreement with Plan of Care agrees   Plan of Care Review   Progress improving   OTHER   Outcome Summary Patient reports appetite as good; difficulty sleeping; rates anxiety 10, depression 9; denies SI, HI and hallucinations; reports meds are helping; denies cravings; pt denies symptoms of withdrawl; will continue to monitor patient.        Problem: Overarching Goals (Adult)  Goal: Adheres to Safety Considerations for Self and Others  Outcome: Ongoing (interventions implemented as appropriate)    Goal: Optimized Coping Skills in Response to Life Stressors  Outcome: Ongoing (interventions implemented as appropriate)    Goal: Develops/Participates in Therapeutic [place] to Support Successful Transition  Outcome: Ongoing (interventions implemented as appropriate)

## 2018-08-06 NOTE — PROGRESS NOTES
"INPATIENT PSYCHIATRIC PROGRESS NOTE    Name:  Ailyn Samano  :  1965  MRN:  [id number]  Visit Number:  [id number]  Length of stay:  3    SUBJECTIVE  CC: alcohol withdrawals    INTERVAL HISTORY:  The patient states that she is not feeling good and is experiencing withdrawals and her nerves are bad.  Depression rating 10  Anxiety rating 8/10  Sleep: improving  Withdrawal sx: see ros  Craving: 3/10    Review of Systems   Musculoskeletal: Positive for myalgias.   Neurological: Positive for tremors and headaches.       OBJECTIVE    Temp:  [97.4 °F (36.3 °C)-98.6 °F (37 °C)] 97.9 °F (36.6 °C)  Heart Rate:  [] 83  Resp:  [16-18] 18  BP: (109-158)/(74-93) 117/75    MENTAL STATUS EXAM:  Appearance:Casually dressed, good hygeine.   Cooperation:Cooperative  Psychomotor: No psychomotor agitation/retardation, No EPS, No motor tics  Speech-normal rate, amount.  Mood \"anxious\"   Affect-congruent, appropriate, stable  Thought Content-goal directed, no delusional material present  Thought process-linear, organized.  Suicidality: No SI  Homicidality: No HI  Perception: No AH/VH  Insight-fair   Judgement-fair    Lab Results (last 24 hours)     ** No results found for the last 24 hours. **             Imaging Results (last 24 hours)     ** No results found for the last 24 hours. **             ECG/EMG Results (most recent)     Procedure Component Value Units Date/Time    ECG 12 Lead [[id number]] Collected:  18 0145     Updated:  18 1015    Narrative:       Test Reason : Potential adverse reaction to medications.  Blood Pressure : **/** mmHG  Vent. Rate : 059 BPM     Atrial Rate : 059 BPM     P-R Int : 134 ms          QRS Dur : 086 ms      QT Int : 426 ms       P-R-T Axes : 010 055 017 degrees     QTc Int : 421 ms    Sinus bradycardia  Otherwise normal ECG  No previous ECGs available  Confirmed by Anthony Allen ([id number]) on 8/3/2018 10:15:30 AM    Referred By:  TIMUR           Confirmed By:Anthony Allen "           ALLERGIES: Patient has no known allergies.      Current Facility-Administered Medications:   •  aluminum-magnesium hydroxide-simethicone (MAALOX MAX) 400-400-40 MG/5ML suspension 15 mL, 15 mL, Oral, Q6H PRN, Jason Acuna MD  •  benzonatate (TESSALON) capsule 100 mg, 100 mg, Oral, TID PRN, Jason Acuna MD, 100 mg at 18  •  benztropine (COGENTIN) tablet 1 mg, 1 mg, Oral, Daily PRN **OR** benztropine (COGENTIN) injection 0.5 mg, 0.5 mg, Intramuscular, Daily PRN, Jason Acuna MD  •  famotidine (PEPCID) tablet 20 mg, 20 mg, Oral, BID PRN, Jason Acuna MD  •  hydrOXYzine (ATARAX) tablet 50 mg, 50 mg, Oral, Q6H PRN, Jason Acuna MD, 50 mg at 18 1005  •  ibuprofen (ADVIL,MOTRIN) tablet 600 mg, 600 mg, Oral, Q4H PRN, Jason Acuna MD, 600 mg at 18  •  loperamide (IMODIUM) capsule 2 mg, 2 mg, Oral, 4x Daily PRN, Jason Acuna MD, 2 mg at 18 1812  •  [COMPLETED] LORazepam (ATIVAN) tablet 2 mg, 2 mg, Oral, 3 times per day, 2 mg at 18 **FOLLOWED BY** [COMPLETED] LORazepam (ATIVAN) tablet 1.5 mg, 1.5 mg, Oral, 3 times per day, 1.5 mg at 18 **FOLLOWED BY** [COMPLETED] LORazepam (ATIVAN) tablet 1 mg, 1 mg, Oral, 3 times per day, 1 mg at 18 **FOLLOWED BY** LORazepam (ATIVAN) tablet 0.5 mg, 0.5 mg, Oral, 3 times per day, Jason Acuna MD, 0.5 mg at 18 0839  •  [] LORazepam (ATIVAN) tablet 2 mg, 2 mg, Oral, Q4H PRN **FOLLOWED BY** [] LORazepam (ATIVAN) tablet 1.5 mg, 1.5 mg, Oral, Q4H PRN **FOLLOWED BY** LORazepam (ATIVAN) tablet 1 mg, 1 mg, Oral, Q4H PRN **FOLLOWED BY** [START ON 2018] LORazepam (ATIVAN) tablet 0.5 mg, 0.5 mg, Oral, Q4H PRN, Jason Acuna MD  •  magnesium hydroxide (MILK OF MAGNESIA) suspension 2400 mg/10mL 10 mL, 10 mL, Oral, Daily PRN, Jason Acuna MD  •  nicotine (NICODERM CQ) 21 MG/24HR patch 1 patch, 1 patch, Transdermal, Q24H, Jason Acuna MD  •  ondansetron  (ZOFRAN) tablet 4 mg, 4 mg, Oral, Q6H PRN, Jason Acuna MD  •  sodium chloride (OCEAN) nasal spray 2 spray, 2 spray, Each Nare, PRN, Jason Acuna MD  •  traZODone (DESYREL) tablet 100 mg, 100 mg, Oral, Nightly PRN, Jason Acuna MD, 100 mg at 08/05/18 2127  •  vitamin B-1 tablet 100 mg, 100 mg, Oral, Daily, Jason Acuna MD, 100 mg at 08/06/18 0839    ASSESSMENT & PLAN:    Active Problems:    EtOH dependence (CMS/HCC)  Plan: Continue detox      Suicide precautions: None    Behavioral Health Treatment Plan and Problem List: I have reviewed and approved the Behavioral Health Treatment Plan and Problem list.  The patient has had a chance to review and agrees with the treatment plan.     Clinician:  Andrew Stewart MD  08/06/18  2:03 PM
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
## 2018-08-07 VITALS
RESPIRATION RATE: 18 BRPM | BODY MASS INDEX: 23.57 KG/M2 | DIASTOLIC BLOOD PRESSURE: 77 MMHG | HEIGHT: 71 IN | SYSTOLIC BLOOD PRESSURE: 122 MMHG | OXYGEN SATURATION: 97 % | TEMPERATURE: 99.1 F | HEART RATE: 84 BPM | WEIGHT: 168.4 LBS

## 2018-08-07 PROCEDURE — 99238 HOSP IP/OBS DSCHRG MGMT 30/<: CPT | Performed by: PSYCHIATRY & NEUROLOGY

## 2018-08-07 RX ADMIN — THIAMINE HCL (VITAMIN B1) 50 MG TABLET 100 MG: at 08:21

## 2018-08-07 RX ADMIN — IBUPROFEN 600 MG: 600 TABLET ORAL at 08:22

## 2018-08-07 RX ADMIN — IBUPROFEN 600 MG: 600 TABLET ORAL at 15:21

## 2018-08-07 NOTE — PROGRESS NOTES
The patient is being discharged today. She will follow up with [facility]. She will return to her home in [place]. Her mother will provide transportation.

## 2018-08-07 NOTE — PLAN OF CARE
Problem: Patient Care Overview  Goal: Plan of Care Review  Outcome: Ongoing (interventions implemented as appropriate)   08/07/18 0552   Coping/Psychosocial   Plan of Care Reviewed With patient   Coping/Psychosocial   Patient Agreement with Plan of Care agrees   Plan of Care Review   Progress improving   OTHER   Outcome Summary Patient stable and slept all night. Reports anxiety 4, depression 4. Craving 2. WD symptoms headache, leg cramps, chills. Denies SI and HI. Denies hallucinations. Cooperative. Did not attend Group.

## 2018-08-07 NOTE — DISCHARGE SUMMARY
"      PSYCHIATRIC DISCHARGE SUMMARY     Patient Identification:  Name:  Ailyn Samano  Age:  52 y.o.  Sex:  female  :  1965  MRN:  [id number]  Visit Number:  [id number]      Date of Admission:8/3/2018   Date of Discharge:  2018    Discharge Diagnosis:  Active Problems:    EtOH dependence (CMS/HCC)        Admission Diagnosis:  EtOH dependence (CMS/HCC) [F10.20]     Hospital Course  Patient is a 52 y.o. female presented with alcohol use and withdrawals. The patient was admitted to the [facility] detox recovery unit for safety, further evaluation and treatment.  She was started on Ativan detox and she was able to complete it without any complications.  The patient was also able to take part in individual and group counseling sessions and work on appropriate coping skills.  The patient made steady improvement in her mood and expressed feeling more positive and hopeful about future. Sleep and appetite were improved.  The day of discharge the patient was calm, cooperative and pleasant. Mood was reported to be good, and denied SI/HI/AVH. Also reported no medication side effects.        Mental Status Exam upon discharge:   Mood \"good\"   Affect-congruent, appropriate, stable  Thought Content-goal directed, no delusional material present  Thought process-linear, organized.  Suicidality: No SI  Homicidality: No HI  Perception: No AH/VH    Procedures Performed         Consults:   Consults     No orders found from 2018 to 2018.          Pertinent Test Results: ALT 94, , Blood alcohol level 277, UDS negative.     Condition on Discharge:  improved    Vital Signs  Temp:  [97.4 °F (36.3 °C)-98.2 °F (36.8 °C)] 97.6 °F (36.4 °C)  Heart Rate:  [71-93] 93  Resp:  [16-18] 18  BP: (114-142)/(62-88) 142/62      Discharge Disposition:  Home or Self Care    Discharge Medications:     Discharge Medications      Continue These Medications      Instructions Start Date   acetaminophen 325 MG tablet  Commonly known " as:  TYLENOL   650 mg, Oral, Every 6 Hours PRN      ibuprofen 800 MG tablet  Commonly known as:  ADVIL,MOTRIN   800 mg, Oral, Every 8 Hours PRN             Discharge Diet: Regular    Activity at Discharge: As tolerated    Follow-up Appointments        [place] [id number]     Ph: [phone]      Appt: August 8th @ 10:00am with intake         Test Results Pending at Discharge      Clinician:   Andrew Stewart MD  08/07/18  3:09 PM

## 2020-03-15 ENCOUNTER — HOSPITAL ENCOUNTER (EMERGENCY)
Facility: HOSPITAL | Age: 55
Discharge: HOME | End: 2020-03-16
Attending: FAMILY MEDICINE | Admitting: EMERGENCY MEDICINE

## 2020-03-15 DIAGNOSIS — A49.9 UTI (URINARY TRACT INFECTION), BACTERIAL: ICD-10-CM

## 2020-03-15 DIAGNOSIS — J18.9 PNEUMONIA OF LEFT LUNG DUE TO INFECTIOUS ORGANISM, UNSPECIFIED PART OF LUNG: ICD-10-CM

## 2020-03-15 DIAGNOSIS — N39.0 UTI (URINARY TRACT INFECTION), BACTERIAL: ICD-10-CM

## 2020-03-15 DIAGNOSIS — F10.20 ALCOHOLISM (HCC): Primary | ICD-10-CM

## 2020-03-15 LAB
6-ACETYL MORPHINE: NEGATIVE
ALBUMIN SERPL-MCNC: 4.08 G/DL (ref 3.5–5.2)
ALBUMIN/GLOB SERPL: 1.3 G/DL
ALP SERPL-CCNC: 123 U/L (ref 39–117)
ALT SERPL W P-5'-P-CCNC: 24 U/L (ref 1–33)
AMPHET+METHAMPHET UR QL: NEGATIVE
ANION GAP SERPL CALCULATED.3IONS-SCNC: 14.7 MMOL/L (ref 5–15)
APAP SERPL-MCNC: <5 MCG/ML (ref 10–30)
AST SERPL-CCNC: 38 U/L (ref 1–32)
BACTERIA UR QL AUTO: ABNORMAL /HPF
BARBITURATES UR QL SCN: NEGATIVE
BASOPHILS # BLD AUTO: 0.03 10*3/MM3 (ref 0–0.2)
BASOPHILS NFR BLD AUTO: 0.4 % (ref 0–1.5)
BENZODIAZ UR QL SCN: NEGATIVE
BILIRUB SERPL-MCNC: 0.2 MG/DL (ref 0.2–1.2)
BILIRUB UR QL STRIP: NEGATIVE
BUN BLD-MCNC: 9 MG/DL (ref 6–20)
BUN/CREAT SERPL: 14.3 (ref 7–25)
BUPRENORPHINE SERPL-MCNC: NEGATIVE NG/ML
CALCIUM SPEC-SCNC: 9.1 MG/DL (ref 8.6–10.5)
CANNABINOIDS SERPL QL: NEGATIVE
CHLORIDE SERPL-SCNC: 103 MMOL/L (ref 98–107)
CLARITY UR: CLEAR
CO2 SERPL-SCNC: 25.3 MMOL/L (ref 22–29)
COCAINE UR QL: NEGATIVE
COLOR UR: YELLOW
CREAT BLD-MCNC: 0.63 MG/DL (ref 0.57–1)
DEPRECATED RDW RBC AUTO: 46.8 FL (ref 37–54)
EOSINOPHIL # BLD AUTO: 0.07 10*3/MM3 (ref 0–0.4)
EOSINOPHIL NFR BLD AUTO: 0.8 % (ref 0.3–6.2)
ERYTHROCYTE [DISTWIDTH] IN BLOOD BY AUTOMATED COUNT: 11.9 % (ref 12.3–15.4)
ETHANOL BLD-MCNC: 374 MG/DL (ref 0–10)
ETHANOL UR QL: 0.37 %
GFR SERPL CREATININE-BSD FRML MDRD: 99 ML/MIN/1.73
GLOBULIN UR ELPH-MCNC: 3.2 GM/DL
GLUCOSE BLD-MCNC: 103 MG/DL (ref 65–99)
GLUCOSE UR STRIP-MCNC: NEGATIVE MG/DL
HCG SERPL QL: NEGATIVE
HCT VFR BLD AUTO: 36.6 % (ref 34–46.6)
HGB BLD-MCNC: 12 G/DL (ref 12–15.9)
HGB UR QL STRIP.AUTO: NEGATIVE
HYALINE CASTS UR QL AUTO: ABNORMAL /LPF
IMM GRANULOCYTES # BLD AUTO: 0.03 10*3/MM3 (ref 0–0.05)
IMM GRANULOCYTES NFR BLD AUTO: 0.4 % (ref 0–0.5)
KETONES UR QL STRIP: NEGATIVE
LEUKOCYTE ESTERASE UR QL STRIP.AUTO: ABNORMAL
LIPASE SERPL-CCNC: 62 U/L (ref 13–60)
LYMPHOCYTES # BLD AUTO: 1.31 10*3/MM3 (ref 0.7–3.1)
LYMPHOCYTES NFR BLD AUTO: 15.4 % (ref 19.6–45.3)
MAGNESIUM SERPL-MCNC: 1.8 MG/DL (ref 1.6–2.6)
MCH RBC QN AUTO: 35 PG (ref 26.6–33)
MCHC RBC AUTO-ENTMCNC: 32.8 G/DL (ref 31.5–35.7)
MCV RBC AUTO: 106.7 FL (ref 79–97)
METHADONE UR QL SCN: NEGATIVE
MONOCYTES # BLD AUTO: 0.72 10*3/MM3 (ref 0.1–0.9)
MONOCYTES NFR BLD AUTO: 8.4 % (ref 5–12)
NEUTROPHILS # BLD AUTO: 6.37 10*3/MM3 (ref 1.7–7)
NEUTROPHILS NFR BLD AUTO: 74.6 % (ref 42.7–76)
NITRITE UR QL STRIP: POSITIVE
NRBC BLD AUTO-RTO: 0 /100 WBC (ref 0–0.2)
OPIATES UR QL: NEGATIVE
OXYCODONE UR QL SCN: NEGATIVE
PCP UR QL SCN: NEGATIVE
PH UR STRIP.AUTO: 6.5 [PH] (ref 5–8)
PLATELET # BLD AUTO: 109 10*3/MM3 (ref 140–450)
PMV BLD AUTO: 9.3 FL (ref 6–12)
POTASSIUM BLD-SCNC: 4.2 MMOL/L (ref 3.5–5.2)
PROT SERPL-MCNC: 7.3 G/DL (ref 6–8.5)
PROT UR QL STRIP: NEGATIVE
RBC # BLD AUTO: 3.43 10*6/MM3 (ref 3.77–5.28)
RBC # UR: ABNORMAL /HPF
REF LAB TEST METHOD: ABNORMAL
SALICYLATES SERPL-MCNC: <0.3 MG/DL
SODIUM BLD-SCNC: 143 MMOL/L (ref 136–145)
SP GR UR STRIP: 1.01 (ref 1–1.03)
SQUAMOUS #/AREA URNS HPF: ABNORMAL /HPF
UROBILINOGEN UR QL STRIP: ABNORMAL
WBC NRBC COR # BLD: 8.53 10*3/MM3 (ref 3.4–10.8)
WBC UR QL AUTO: ABNORMAL /HPF

## 2020-03-15 PROCEDURE — 87086 URINE CULTURE/COLONY COUNT: CPT | Performed by: EMERGENCY MEDICINE

## 2020-03-15 PROCEDURE — 80307 DRUG TEST PRSMV CHEM ANLYZR: CPT | Performed by: FAMILY MEDICINE

## 2020-03-15 PROCEDURE — 25010000002 THIAMINE PER 100 MG: Performed by: FAMILY MEDICINE

## 2020-03-15 PROCEDURE — 81001 URINALYSIS AUTO W/SCOPE: CPT | Performed by: FAMILY MEDICINE

## 2020-03-15 PROCEDURE — 85025 COMPLETE CBC W/AUTO DIFF WBC: CPT | Performed by: FAMILY MEDICINE

## 2020-03-15 PROCEDURE — 83735 ASSAY OF MAGNESIUM: CPT | Performed by: FAMILY MEDICINE

## 2020-03-15 PROCEDURE — 87088 URINE BACTERIA CULTURE: CPT | Performed by: EMERGENCY MEDICINE

## 2020-03-15 PROCEDURE — 96375 TX/PRO/DX INJ NEW DRUG ADDON: CPT

## 2020-03-15 PROCEDURE — 87186 SC STD MICRODIL/AGAR DIL: CPT | Performed by: EMERGENCY MEDICINE

## 2020-03-15 PROCEDURE — 96365 THER/PROPH/DIAG IV INF INIT: CPT

## 2020-03-15 PROCEDURE — 36415 COLL VENOUS BLD VENIPUNCTURE: CPT

## 2020-03-15 PROCEDURE — 80053 COMPREHEN METABOLIC PANEL: CPT | Performed by: FAMILY MEDICINE

## 2020-03-15 PROCEDURE — 84703 CHORIONIC GONADOTROPIN ASSAY: CPT | Performed by: FAMILY MEDICINE

## 2020-03-15 PROCEDURE — 25010000002 MAGNESIUM SULFATE PER 500 MG OF MAGNESIUM: Performed by: FAMILY MEDICINE

## 2020-03-15 PROCEDURE — 83690 ASSAY OF LIPASE: CPT | Performed by: FAMILY MEDICINE

## 2020-03-15 PROCEDURE — 99285 EMERGENCY DEPT VISIT HI MDM: CPT

## 2020-03-15 RX ORDER — CEPHALEXIN 250 MG/1
500 CAPSULE ORAL ONCE
Status: COMPLETED | OUTPATIENT
Start: 2020-03-15 | End: 2020-03-15

## 2020-03-15 RX ORDER — SODIUM CHLORIDE 0.9 % (FLUSH) 0.9 %
10 SYRINGE (ML) INJECTION AS NEEDED
Status: DISCONTINUED | OUTPATIENT
Start: 2020-03-15 | End: 2020-03-16 | Stop reason: HOSPADM

## 2020-03-15 RX ADMIN — THIAMINE HYDROCHLORIDE 1000 ML/HR: 100 INJECTION, SOLUTION INTRAMUSCULAR; INTRAVENOUS at 23:35

## 2020-03-15 RX ADMIN — CEPHALEXIN 500 MG: 250 CAPSULE ORAL at 23:35

## 2020-03-16 ENCOUNTER — APPOINTMENT (OUTPATIENT)
Dept: CT IMAGING | Facility: HOSPITAL | Age: 55
End: 2020-03-16

## 2020-03-16 VITALS
RESPIRATION RATE: 20 BRPM | HEART RATE: 76 BPM | WEIGHT: 137 LBS | TEMPERATURE: 99.6 F | HEIGHT: 70 IN | BODY MASS INDEX: 19.61 KG/M2 | DIASTOLIC BLOOD PRESSURE: 91 MMHG | OXYGEN SATURATION: 97 % | SYSTOLIC BLOOD PRESSURE: 150 MMHG

## 2020-03-16 LAB
ETHANOL BLD-MCNC: 136 MG/DL (ref 0–10)
ETHANOL BLD-MCNC: 205 MG/DL (ref 0–10)
ETHANOL BLD-MCNC: 88 MG/DL (ref 0–10)
ETHANOL UR QL: 0.09 %
ETHANOL UR QL: 0.14 %
ETHANOL UR QL: 0.2 %

## 2020-03-16 PROCEDURE — 80307 DRUG TEST PRSMV CHEM ANLYZR: CPT | Performed by: FAMILY MEDICINE

## 2020-03-16 PROCEDURE — 25010000002 LORAZEPAM PER 2 MG: Performed by: EMERGENCY MEDICINE

## 2020-03-16 PROCEDURE — 74177 CT ABD & PELVIS W/CONTRAST: CPT

## 2020-03-16 PROCEDURE — 74177 CT ABD & PELVIS W/CONTRAST: CPT | Performed by: RADIOLOGY

## 2020-03-16 PROCEDURE — 70450 CT HEAD/BRAIN W/O DYE: CPT | Performed by: RADIOLOGY

## 2020-03-16 PROCEDURE — 70450 CT HEAD/BRAIN W/O DYE: CPT

## 2020-03-16 PROCEDURE — 0 IOVERSOL 68 % SOLUTION: Performed by: EMERGENCY MEDICINE

## 2020-03-16 PROCEDURE — 96375 TX/PRO/DX INJ NEW DRUG ADDON: CPT

## 2020-03-16 RX ORDER — LORAZEPAM 2 MG/ML
1 INJECTION INTRAMUSCULAR ONCE
Status: COMPLETED | OUTPATIENT
Start: 2020-03-16 | End: 2020-03-16

## 2020-03-16 RX ORDER — CEFDINIR 300 MG/1
300 CAPSULE ORAL 2 TIMES DAILY
Qty: 19 CAPSULE | Refills: 0 | Status: SHIPPED | OUTPATIENT
Start: 2020-03-16 | End: 2021-07-12

## 2020-03-16 RX ORDER — PANTOPRAZOLE SODIUM 40 MG/10ML
40 INJECTION, POWDER, LYOPHILIZED, FOR SOLUTION INTRAVENOUS ONCE
Status: COMPLETED | OUTPATIENT
Start: 2020-03-16 | End: 2020-03-16

## 2020-03-16 RX ORDER — ACETAMINOPHEN 325 MG/1
650 TABLET ORAL ONCE
Status: COMPLETED | OUTPATIENT
Start: 2020-03-16 | End: 2020-03-16

## 2020-03-16 RX ORDER — DOXYCYCLINE 100 MG/1
100 CAPSULE ORAL EVERY 12 HOURS
Qty: 19 CAPSULE | Refills: 0 | Status: SHIPPED | OUTPATIENT
Start: 2020-03-16 | End: 2021-07-12

## 2020-03-16 RX ORDER — ACETAMINOPHEN 325 MG/1
325 TABLET ORAL ONCE
Status: COMPLETED | OUTPATIENT
Start: 2020-03-16 | End: 2020-03-16

## 2020-03-16 RX ORDER — CEFDINIR 300 MG/1
300 CAPSULE ORAL ONCE
Status: COMPLETED | OUTPATIENT
Start: 2020-03-16 | End: 2020-03-16

## 2020-03-16 RX ORDER — IBUPROFEN 400 MG/1
400 TABLET ORAL ONCE
Status: COMPLETED | OUTPATIENT
Start: 2020-03-16 | End: 2020-03-16

## 2020-03-16 RX ORDER — DOXYCYCLINE 100 MG/1
100 CAPSULE ORAL ONCE
Status: COMPLETED | OUTPATIENT
Start: 2020-03-16 | End: 2020-03-16

## 2020-03-16 RX ADMIN — ACETAMINOPHEN 650 MG: 325 TABLET ORAL at 10:29

## 2020-03-16 RX ADMIN — IBUPROFEN 400 MG: 400 TABLET ORAL at 03:44

## 2020-03-16 RX ADMIN — LORAZEPAM 1 MG: 2 INJECTION INTRAMUSCULAR; INTRAVENOUS at 09:26

## 2020-03-16 RX ADMIN — ACETAMINOPHEN 325 MG: 325 TABLET ORAL at 01:43

## 2020-03-16 RX ADMIN — IOVERSOL 100 ML: 678 INJECTION INTRA-ARTERIAL; INTRAVENOUS at 09:59

## 2020-03-16 RX ADMIN — PANTOPRAZOLE SODIUM 40 MG: 40 INJECTION, POWDER, FOR SOLUTION INTRAVENOUS at 09:28

## 2020-03-16 RX ADMIN — CEFDINIR 300 MG: 300 CAPSULE ORAL at 13:51

## 2020-03-16 RX ADMIN — DOXYCYCLINE 100 MG: 100 CAPSULE ORAL at 13:51
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
## 2020-03-16 NOTE — ED NOTES
Attempted to call patient's sister Tayla to come  patient. Sister states that her  has her vehicle, she does not have a way to come get her. Patient's sister reported that it would be later tonight before she could come get her.      Rachid Harry, RN  03/16/20 1774
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
## 2020-03-16 NOTE — NURSING NOTE
Called and provided intake information including all abnormal  labs to Dr Oh .discharge orders received.[id number]. Patient and ED provider aware.

## 2020-03-16 NOTE — ED NOTES
Pt d/c'd to home. She will wait in lobby (ok per DR Ya).  her sister was notified to pick her up. All questions answered. No acute distress noted skin warm pink and dry      So Franklin RN  03/16/20 [id number]

## 2020-03-18 LAB — BACTERIA SPEC AEROBE CULT: ABNORMAL

## 2021-07-12 ENCOUNTER — OFFICE VISIT (OUTPATIENT)
Dept: FAMILY MEDICINE CLINIC | Facility: CLINIC | Age: 56
End: 2021-07-12

## 2021-07-12 ENCOUNTER — TELEPHONE (OUTPATIENT)
Dept: FAMILY MEDICINE CLINIC | Facility: CLINIC | Age: 56
End: 2021-07-12

## 2021-07-12 VITALS
DIASTOLIC BLOOD PRESSURE: 74 MMHG | OXYGEN SATURATION: 98 % | HEART RATE: 81 BPM | TEMPERATURE: 97.1 F | SYSTOLIC BLOOD PRESSURE: 110 MMHG

## 2021-07-12 DIAGNOSIS — M25.551 RIGHT HIP PAIN: Primary | ICD-10-CM

## 2021-07-12 DIAGNOSIS — E78.2 MIXED HYPERLIPIDEMIA: Chronic | ICD-10-CM

## 2021-07-12 DIAGNOSIS — I10 ESSENTIAL HYPERTENSION: Chronic | ICD-10-CM

## 2021-07-12 DIAGNOSIS — R74.8 ELEVATED LIVER ENZYMES: ICD-10-CM

## 2021-07-12 DIAGNOSIS — M54.41 ACUTE RIGHT-SIDED LOW BACK PAIN WITH RIGHT-SIDED SCIATICA: ICD-10-CM

## 2021-07-12 PROCEDURE — 96372 THER/PROPH/DIAG INJ SC/IM: CPT | Performed by: PHYSICIAN ASSISTANT

## 2021-07-12 PROCEDURE — 99204 OFFICE O/P NEW MOD 45 MIN: CPT | Performed by: PHYSICIAN ASSISTANT

## 2021-07-12 RX ORDER — IBUPROFEN 800 MG/1
800 TABLET ORAL EVERY 8 HOURS PRN
Qty: 90 TABLET | Refills: 0 | Status: ON HOLD | OUTPATIENT
Start: 2021-07-12 | End: 2022-07-09

## 2021-07-12 RX ORDER — KETOROLAC TROMETHAMINE 30 MG/ML
30 INJECTION, SOLUTION INTRAMUSCULAR; INTRAVENOUS ONCE
Status: COMPLETED | OUTPATIENT
Start: 2021-07-12 | End: 2021-07-12

## 2021-07-12 RX ORDER — CYCLOBENZAPRINE HCL 10 MG
10 TABLET ORAL 3 TIMES DAILY PRN
Qty: 90 TABLET | Refills: 0 | Status: ON HOLD | OUTPATIENT
Start: 2021-07-12 | End: 2022-07-09

## 2021-07-12 RX ADMIN — KETOROLAC TROMETHAMINE 30 MG: 30 INJECTION, SOLUTION INTRAMUSCULAR; INTRAVENOUS at 10:34

## 2021-07-12 NOTE — TELEPHONE ENCOUNTER
----- Message from TWAN Almanza sent at 7/12/2021  9:21 AM EDT -----   Set up home PT for this patient please - right sciatica/groin strain

## 2021-07-12 NOTE — PATIENT INSTRUCTIONS
Fall Prevention in the Home, Adult  Falls can cause injuries. They can happen to people of all ages. There are many things you can do to make your home safe and to help prevent falls. Ask for help when making these changes, if needed.  What actions can I take to prevent falls?  General Instructions  · Use good lighting in all rooms. Replace any light bulbs that burn out.  · Turn on the lights when you go into a dark area. Use night-lights.  · Keep items that you use often in easy-to-reach places. Lower the shelves around your home if necessary.  · Set up your furniture so you have a clear path. Avoid moving your furniture around.  · Do not have throw rugs and other things on the floor that can make you trip.  · Avoid walking on wet floors.  · If any of your floors are uneven, fix them.  · Add color or contrast paint or tape to clearly cj and help you see:  ? Any grab bars or handrails.  ? First and last steps of stairways.  ? Where the edge of each step is.  · If you use a stepladder:  ? Make sure that it is fully opened. Do not climb a closed stepladder.  ? Make sure that both sides of the stepladder are locked into place.  ? Ask someone to hold the stepladder for you while you use it.  · If there are any pets around you, be aware of where they are.  What can I do in the bathroom?         · Keep the floor dry. Clean up any water that spills onto the floor as soon as it happens.  · Remove soap buildup in the tub or shower regularly.  · Use non-skid mats or decals on the floor of the tub or shower.  · Attach bath mats securely with double-sided, non-slip rug tape.  · If you need to sit down in the shower, use a plastic, non-slip stool.  · Install grab bars by the toilet and in the tub and shower. Do not use towel bars as grab bars.  What can I do in the bedroom?  · Make sure that you have a light by your bed that is easy to reach.  · Do not use any sheets or blankets that are too big for your bed. They should not  hang down onto the floor.  · Have a firm chair that has side arms. You can use this for support while you get dressed.  What can I do in the kitchen?  · Clean up any spills right away.  · If you need to reach something above you, use a strong step stool that has a grab bar.  · Keep electrical cords out of the way.  · Do not use floor polish or wax that makes floors slippery. If you must use wax, use non-skid floor wax.  What can I do with my stairs?  · Do not leave any items on the stairs.  · Make sure that you have a light switch at the top of the stairs and the bottom of the stairs. If you do not have them, ask someone to add them for you.  · Make sure that there are handrails on both sides of the stairs, and use them. Fix handrails that are broken or loose. Make sure that handrails are as long as the stairways.  · Install non-slip stair treads on all stairs in your home.  · Avoid having throw rugs at the top or bottom of the stairs. If you do have throw rugs, attach them to the floor with carpet tape.  · Choose a carpet that does not hide the edge of the steps on the stairway.  · Check any carpeting to make sure that it is firmly attached to the stairs. Fix any carpet that is loose or worn.  What can I do on the outside of my home?  · Use bright outdoor lighting.  · Regularly fix the edges of walkways and driveways and fix any cracks.  · Remove anything that might make you trip as you walk through a door, such as a raised step or threshold.  · Trim any bushes or trees on the path to your home.  · Regularly check to see if handrails are loose or broken. Make sure that both sides of any steps have handrails.  · Install guardrails along the edges of any raised decks and porches.  · Clear walking paths of anything that might make someone trip, such as tools or rocks.  · Have any leaves, snow, or ice cleared regularly.  · Use sand or salt on walking paths during winter.  · Clean up any spills in your garage right  away. This includes grease or oil spills.  What other actions can I take?  · Wear shoes that:  ? Have a low heel. Do not wear high heels.  ? Have rubber bottoms.  ? Are comfortable and fit you well.  ? Are closed at the toe. Do not wear open-toe sandals.  · Use tools that help you move around (mobility aids) if they are needed. These include:  ? Canes.  ? Walkers.  ? Scooters.  ? Crutches.  · Review your medicines with your doctor. Some medicines can make you feel dizzy. This can increase your chance of falling.  Ask your doctor what other things you can do to help prevent falls.  Where to find more information  · Centers for Disease Control and Prevention, [place]: https://cdc.gov  · National [place] on Aging: https://bh5efaf.amita.nih.gov  Contact a doctor if:  · You are afraid of falling at home.  · You feel weak, drowsy, or dizzy at home.  · You fall at home.  Summary  · There are many simple things that you can do to make your home safe and to help prevent falls.  · Ways to make your home safe include removing tripping hazards and installing grab bars in the bathroom.  · Ask for help when making these changes in your home.  This information is not intended to replace advice given to you by your health care provider. Make sure you discuss any questions you have with your health care provider.  Document Revised: 04/09/2020 Document Reviewed: 08/02/2018  [employer] Patient Education © 2021 [employer]      Fat and Cholesterol Restricted Eating Plan  Getting too much fat and cholesterol in your diet may cause health problems. Choosing the right foods helps keep your fat and cholesterol at normal levels. This can keep you from getting certain diseases.  Your doctor may recommend an eating plan that includes:  · Total fat: ______% or less of total calories a day.  · Saturated fat: ______% or less of total calories a day.  · Cholesterol: less than _________mg a day.  · Fiber: ______g a day.  What are tips for following  "this plan?  Meal planning  · At meals, divide your plate into four equal parts:  ? Fill one-half of your plate with vegetables and green salads.  ? Fill one-fourth of your plate with whole grains.  ? Fill one-fourth of your plate with low-fat (lean) protein foods.  · Eat fish that is high in omega-3 fats at least two times a week. This includes mackerel, tuna, sardines, and salmon.  · Eat foods that are high in fiber, such as whole grains, beans, apples, broccoli, carrots, peas, and barley.  General tips    · Work with your doctor to lose weight if you need to.  · Avoid:  ? Foods with added sugar.  ? Fried foods.  ? Foods with partially hydrogenated oils.  · Limit alcohol intake to no more than 1 drink a day for nonpregnant women and 2 drinks a day for men. One drink equals 12 oz of beer, 5 oz of wine, or 1½ oz of hard liquor.  Reading food labels  · Check food labels for:  ? Trans fats.  ? Partially hydrogenated oils.  ? Saturated fat (g) in each serving.  ? Cholesterol (mg) in each serving.  ? Fiber (g) in each serving.  · Choose foods with healthy fats, such as:  ? Monounsaturated fats.  ? Polyunsaturated fats.  ? Omega-3 fats.  · Choose grain products that have whole grains. Look for the word \"whole\" as the first word in the ingredient list.  Cooking  · Cook foods using low-fat methods. These include baking, boiling, grilling, and broiling.  · Eat more home-cooked foods. Eat at restaurants and buffets less often.  · Avoid cooking using saturated fats, such as butter, cream, palm oil, palm kernel oil, and coconut oil.  Recommended foods    Fruits  · All fresh, canned (in natural juice), or frozen fruits.  Vegetables  · Fresh or frozen vegetables (raw, steamed, roasted, or grilled). Green salads.  Grains  · Whole grains, such as whole wheat or whole grain breads, crackers, cereals, and pasta. Unsweetened oatmeal, bulgur, barley, quinoa, or brown rice. Corn or whole wheat flour tortillas.  Meats and other protein " foods  · Ground beef (85% or leaner), grass-fed beef, or beef trimmed of fat. Skinless chicken or turkey. Ground chicken or turkey. Pork trimmed of fat. All fish and seafood. Egg whites. Dried beans, peas, or lentils. Unsalted nuts or seeds. Unsalted canned beans. Nut butters without added sugar or oil.  Dairy  · Low-fat or nonfat dairy products, such as skim or 1% milk, 2% or reduced-fat cheeses, low-fat and fat-free ricotta or cottage cheese, or plain low-fat and nonfat yogurt.  Fats and oils  · Tub margarine without trans fats. Light or reduced-fat mayonnaise and salad dressings. Avocado. Olive, canola, sesame, or safflower oils.  The items listed above may not be a complete list of foods and beverages you can eat. Contact a dietitian for more information.  Foods to avoid  Fruits  · Canned fruit in heavy syrup. Fruit in cream or butter sauce. Fried fruit.  Vegetables  · Vegetables cooked in cheese, cream, or butter sauce. Fried vegetables.  Grains  · White bread. White pasta. White rice. Cornbread. Bagels, pastries, and croissants. Crackers and snack foods that contain trans fat and hydrogenated oils.  Meats and other protein foods  · Fatty cuts of meat. Ribs, chicken wings, ramos, sausage, bologna, salami, chitterlings, fatback, hot dogs, bratwurst, and packaged lunch meats. Liver and organ meats. Whole eggs and egg yolks. Chicken and turkey with skin. Fried meat.  Dairy  · Whole or 2% milk, cream, half-and-half, and cream cheese. Whole milk cheeses. Whole-fat or sweetened yogurt. Full-fat cheeses. Nondairy creamers and whipped toppings. Processed cheese, cheese spreads, and cheese curds.  Beverages  · Alcohol. Sugar-sweetened drinks such as sodas, lemonade, and fruit drinks.  Fats and oils  · Butter, stick margarine, lard, shortening, ghee, or ramos fat. Coconut, palm kernel, and palm oils.  Sweets and desserts  · Corn syrup, sugars, honey, and molasses. Candy. Jam and jelly. Syrup. Sweetened cereals. Cookies,  pies, cakes, donuts, muffins, and ice cream.  The items listed above may not be a complete list of foods and beverages you should avoid. Contact a dietitian for more information.  Summary  · Choosing the right foods helps keep your fat and cholesterol at normal levels. This can keep you from getting certain diseases.  · At meals, fill one-half of your plate with vegetables and green salads.  · Eat high-fiber foods, like whole grains, beans, apples, carrots, peas, and barley.  · Limit added sugar, saturated fats, alcohol, and fried foods.  This information is not intended to replace advice given to you by your health care provider. Make sure you discuss any questions you have with your health care provider.  Document Revised: 08/21/2019 Document Reviewed: 09/04/2018  Elsevier Patient Education © 2021 [employer]

## 2021-07-12 NOTE — TELEPHONE ENCOUNTER
Called [facility]. Spoke to Angelina Henry RN nursing supervisor. Faxed demographics and last office note.

## 2021-07-12 NOTE — PROGRESS NOTES
Subjective   Ailyn Samano is a 55 y.o. female.       Chief Complaint -establish care and injury from fall    History of Present Illness -    ROS    She is here today to establish care.    Fall-  Patient reports that she tripped over her to wall walk and fell landing on her right hip approximately 2 weeks ago.  Patient states that she has continued to have intermediate severe sharp burning pain in the right hip, right lower back and buttock area.  No radiation of pain.  Pain is worse with weightbearing.  Patient has not sought any medical care until now for these injuries from the fall.  Some relief with Tylenol.    Hypertension-  Stable currently with diet    Hyperlipidemia-stable with diet    Elevated liver enzymes-stable with risk factor modification including avoidance of alcohol.    The following portions of the patient's history were reviewed and updated as appropriate: allergies, current medications, past family history, past medical history, past social history, past surgical history and problem list.    Review of Systems   Constitutional: Positive for activity change. Negative for appetite change, fatigue and fever.   HENT: Negative for ear pain, sinus pressure and sore throat.    Eyes: Negative for pain and visual disturbance.   Respiratory: Negative for cough and chest tightness.    Cardiovascular: Negative for chest pain and palpitations.   Gastrointestinal: Negative for abdominal pain, constipation, diarrhea, nausea and vomiting.   Endocrine: Negative for polydipsia and polyuria.   Genitourinary: Negative for dysuria and frequency.   Musculoskeletal: Positive for arthralgias, back pain, gait problem and myalgias.   Skin: Negative for color change and rash.   Allergic/Immunologic: Negative for food allergies and immunocompromised state.   Neurological: Negative for dizziness, syncope and headaches.   Hematological: Negative for adenopathy. Does not bruise/bleed easily.   Psychiatric/Behavioral: Negative  for hallucinations and suicidal ideas. The patient is not nervous/anxious.        Objective  Vital signs:  /74   Pulse 81   Temp 97.1 °F (36.2 °C) (Temporal)   SpO2 98%     Physical Exam  Vitals and nursing note reviewed.   Constitutional:       Appearance: Normal appearance. She is well-developed.   HENT:      Head: Normocephalic and atraumatic.      Right Ear: Tympanic membrane normal.      Left Ear: Tympanic membrane normal.      Nose: Nose normal.      Mouth/Throat:      Mouth: Mucous membranes are moist.      Pharynx: No oropharyngeal exudate or posterior oropharyngeal erythema.   Eyes:      Extraocular Movements: Extraocular movements intact.      Conjunctiva/sclera: Conjunctivae normal.   Neck:      Thyroid: No thyromegaly.      Trachea: No tracheal deviation.   Cardiovascular:      Rate and Rhythm: Normal rate and regular rhythm.      Heart sounds: Normal heart sounds. No murmur heard.     Pulmonary:      Effort: Pulmonary effort is normal. No respiratory distress.      Breath sounds: Normal breath sounds. No wheezing.   Abdominal:      General: Bowel sounds are normal.      Palpations: Abdomen is soft.      Tenderness: There is no abdominal tenderness. There is no guarding.   Musculoskeletal:         General: Tenderness present.      Cervical back: Normal range of motion and neck supple.      Right lower leg: No edema.      Left lower leg: No edema.      Comments: Tenderness noted to right hip joint to palpation.  Decreased range of joint motion with right hip flexion.   Lymphadenopathy:      Cervical: No cervical adenopathy.   Skin:     General: Skin is warm and dry.      Findings: No rash.   Neurological:      General: No focal deficit present.      Mental Status: She is alert and oriented to person, place, and time.   Psychiatric:         Mood and Affect: Mood normal.         Behavior: Behavior normal.         Thought Content: Thought content normal.         The following data was reviewed by:  TWAN Almanza on 07/12/2021:      Lab Results   Component Value Date    GLUCOSE 103 (H) 03/15/2020    BUN 9 03/15/2020    CREATININE 0.63 03/15/2020    EGFRIFNONA 99 03/15/2020    EGFRIFAFRI 88 09/01/2016    BCR 14.3 03/15/2020    K 4.2 03/15/2020    CO2 25.3 03/15/2020    CALCIUM 9.1 03/15/2020    PROTENTOTREF 7.9 09/01/2016    ALBUMIN 4.08 03/15/2020    LABIL2 1.4 (L) 09/01/2016    AST 38 (H) 03/15/2020    ALT 24 03/15/2020                            Assessment/Plan     Diagnoses and all orders for this visit:    1. Right hip pain (Primary)  Comments:  X-rays ordered for further evaluation  Ordered physical therapy at home  Fall precautions advised  Orders:  -     cyclobenzaprine (FLEXERIL) 10 MG tablet; Take 1 tablet by mouth 3 (Three) Times a Day As Needed for Muscle Spasms.  Dispense: 90 tablet; Refill: 0  -     XR Hip With or Without Pelvis 2 - 3 View Right; Future  -     ketorolac (TORADOL) injection 30 mg    2. Acute right-sided low back pain with right-sided sciatica  Comments:  Start ibuprofen as needed  Ordered home physical therapy  Symptomatic care advised including heat  Orders:  -     cyclobenzaprine (FLEXERIL) 10 MG tablet; Take 1 tablet by mouth 3 (Three) Times a Day As Needed for Muscle Spasms.  Dispense: 90 tablet; Refill: 0  -     ibuprofen (ADVIL,MOTRIN) 800 MG tablet; Take 1 tablet by mouth Every 8 (Eight) Hours As Needed for Mild Pain . Indications: Inflammation  Dispense: 90 tablet; Refill: 0    3. Essential hypertension  Comments:  Advised low-sodium diet   Continue to monitor  Orders:  -     CBC & Differential; Future  -     Comprehensive Metabolic Panel; Future  -     TSH; Future    4. Mixed hyperlipidemia  Comments:  Advised low-cholesterol diet  Orders:  -     Comprehensive Metabolic Panel; Future  -     Lipid Panel; Future    5. Elevated liver enzymes  Comments:  Continue to monitor  Advised avoidance of drinking alcohol and avoid Tylenol  Orders:  -     Comprehensive Metabolic  Panel; Future            Patient was given instructions and counseling regarding his condition or for health maintenance advice. Please see specific information pulled into the AVS if appropriate      This document has been electronically signed by:  Mary Egan PA-C

## 2021-07-15 ENCOUNTER — TELEPHONE (OUTPATIENT)
Dept: FAMILY MEDICINE CLINIC | Facility: CLINIC | Age: 56
End: 2021-07-15

## 2021-07-15 NOTE — TELEPHONE ENCOUNTER
Caller: Ailyn Samano    Relationship: Self    Best call back number:     What test was performed: X RAY     When was the test performed: 07/12/2021    Where was the test performed: [facility]     Additional notes: PATIENT IS CURIOUS IF THE RESULTS HAVE BEEN REVIEWED.

## 2022-07-09 ENCOUNTER — HOSPITAL ENCOUNTER (EMERGENCY)
Facility: HOSPITAL | Age: 57
Discharge: PSYCHIATRIC OR SUBSTANCE USE FACILITY | End: 2022-07-09
Attending: STUDENT IN AN ORGANIZED HEALTH CARE EDUCATION/TRAINING PROGRAM

## 2022-07-09 ENCOUNTER — HOSPITAL ENCOUNTER (INPATIENT)
Facility: HOSPITAL | Age: 57
LOS: 5 days | Discharge: HOME | End: 2022-07-14
Attending: PSYCHIATRY & NEUROLOGY | Admitting: PSYCHIATRY & NEUROLOGY

## 2022-07-09 VITALS
HEIGHT: 70 IN | WEIGHT: 143 LBS | DIASTOLIC BLOOD PRESSURE: 88 MMHG | HEART RATE: 78 BPM | SYSTOLIC BLOOD PRESSURE: 135 MMHG | BODY MASS INDEX: 20.47 KG/M2 | TEMPERATURE: 97.9 F | OXYGEN SATURATION: 97 % | RESPIRATION RATE: 18 BRPM

## 2022-07-09 DIAGNOSIS — F10.10 ALCOHOL ABUSE: Primary | ICD-10-CM

## 2022-07-09 DIAGNOSIS — F10.20 ALCOHOL USE DISORDER, SEVERE, DEPENDENCE: ICD-10-CM

## 2022-07-09 PROBLEM — F19.20 CHEMICAL DEPENDENCY: Status: ACTIVE | Noted: 2022-07-09

## 2022-07-09 LAB
ALBUMIN SERPL-MCNC: 4.29 G/DL (ref 3.5–5.2)
ALBUMIN/GLOB SERPL: 1.3 G/DL
ALP SERPL-CCNC: 128 U/L (ref 39–117)
ALT SERPL W P-5'-P-CCNC: 33 U/L (ref 1–33)
AMPHET+METHAMPHET UR QL: NEGATIVE
AMPHETAMINES UR QL: NEGATIVE
ANION GAP SERPL CALCULATED.3IONS-SCNC: 12.3 MMOL/L (ref 5–15)
AST SERPL-CCNC: 62 U/L (ref 1–32)
B-HCG UR QL: NEGATIVE
BACTERIA UR QL AUTO: ABNORMAL /HPF
BARBITURATES UR QL SCN: NEGATIVE
BASOPHILS # BLD AUTO: 0.06 10*3/MM3 (ref 0–0.2)
BASOPHILS NFR BLD AUTO: 1.1 % (ref 0–1.5)
BENZODIAZ UR QL SCN: NEGATIVE
BILIRUB SERPL-MCNC: 0.2 MG/DL (ref 0–1.2)
BILIRUB UR QL STRIP: NEGATIVE
BUN SERPL-MCNC: 12 MG/DL (ref 6–20)
BUN/CREAT SERPL: 15.6 (ref 7–25)
BUPRENORPHINE SERPL-MCNC: NEGATIVE NG/ML
CALCIUM SPEC-SCNC: 9.2 MG/DL (ref 8.6–10.5)
CANNABINOIDS SERPL QL: NEGATIVE
CHLORIDE SERPL-SCNC: 97 MMOL/L (ref 98–107)
CLARITY UR: CLEAR
CO2 SERPL-SCNC: 23.7 MMOL/L (ref 22–29)
COCAINE UR QL: NEGATIVE
COLOR UR: YELLOW
CREAT SERPL-MCNC: 0.77 MG/DL (ref 0.57–1)
DEPRECATED RDW RBC AUTO: 47.7 FL (ref 37–54)
EGFRCR SERPLBLD CKD-EPI 2021: 91.8 ML/MIN/1.73
EOSINOPHIL # BLD AUTO: 0.08 10*3/MM3 (ref 0–0.4)
EOSINOPHIL NFR BLD AUTO: 1.4 % (ref 0.3–6.2)
ERYTHROCYTE [DISTWIDTH] IN BLOOD BY AUTOMATED COUNT: 11.9 % (ref 12.3–15.4)
ETHANOL BLD-MCNC: 167 MG/DL (ref 0–10)
ETHANOL BLD-MCNC: 345 MG/DL (ref 0–10)
ETHANOL BLD-MCNC: 58 MG/DL (ref 0–10)
ETHANOL UR QL: 0.06 %
ETHANOL UR QL: 0.17 %
ETHANOL UR QL: 0.34 %
FLUAV RNA RESP QL NAA+PROBE: NOT DETECTED
FLUBV RNA RESP QL NAA+PROBE: NOT DETECTED
GLOBULIN UR ELPH-MCNC: 3.3 GM/DL
GLUCOSE SERPL-MCNC: 117 MG/DL (ref 65–99)
GLUCOSE UR STRIP-MCNC: NEGATIVE MG/DL
HCT VFR BLD AUTO: 42.2 % (ref 34–46.6)
HGB BLD-MCNC: 14.5 G/DL (ref 12–15.9)
HGB UR QL STRIP.AUTO: NEGATIVE
HYALINE CASTS UR QL AUTO: ABNORMAL /LPF
IMM GRANULOCYTES # BLD AUTO: 0.01 10*3/MM3 (ref 0–0.05)
IMM GRANULOCYTES NFR BLD AUTO: 0.2 % (ref 0–0.5)
KETONES UR QL STRIP: NEGATIVE
LEUKOCYTE ESTERASE UR QL STRIP.AUTO: NEGATIVE
LYMPHOCYTES # BLD AUTO: 1.96 10*3/MM3 (ref 0.7–3.1)
LYMPHOCYTES NFR BLD AUTO: 34.9 % (ref 19.6–45.3)
MAGNESIUM SERPL-MCNC: 1.8 MG/DL (ref 1.6–2.6)
MCH RBC QN AUTO: 36.5 PG (ref 26.6–33)
MCHC RBC AUTO-ENTMCNC: 34.4 G/DL (ref 31.5–35.7)
MCV RBC AUTO: 106.3 FL (ref 79–97)
METHADONE UR QL SCN: NEGATIVE
MONOCYTES # BLD AUTO: 0.55 10*3/MM3 (ref 0.1–0.9)
MONOCYTES NFR BLD AUTO: 9.8 % (ref 5–12)
NEUTROPHILS NFR BLD AUTO: 2.96 10*3/MM3 (ref 1.7–7)
NEUTROPHILS NFR BLD AUTO: 52.6 % (ref 42.7–76)
NITRITE UR QL STRIP: POSITIVE
NRBC BLD AUTO-RTO: 0 /100 WBC (ref 0–0.2)
OPIATES UR QL: NEGATIVE
OXYCODONE UR QL SCN: NEGATIVE
PCP UR QL SCN: NEGATIVE
PH UR STRIP.AUTO: <=5 [PH] (ref 5–8)
PLATELET # BLD AUTO: 193 10*3/MM3 (ref 140–450)
PMV BLD AUTO: 9.2 FL (ref 6–12)
POTASSIUM SERPL-SCNC: 4.3 MMOL/L (ref 3.5–5.2)
PROPOXYPH UR QL: NEGATIVE
PROT SERPL-MCNC: 7.6 G/DL (ref 6–8.5)
PROT UR QL STRIP: NEGATIVE
RBC # BLD AUTO: 3.97 10*6/MM3 (ref 3.77–5.28)
RBC # UR STRIP: ABNORMAL /HPF
REF LAB TEST METHOD: ABNORMAL
SARS-COV-2 RNA RESP QL NAA+PROBE: NOT DETECTED
SODIUM SERPL-SCNC: 133 MMOL/L (ref 136–145)
SP GR UR STRIP: 1.01 (ref 1–1.03)
SQUAMOUS #/AREA URNS HPF: ABNORMAL /HPF
TRICYCLICS UR QL SCN: NEGATIVE
UROBILINOGEN UR QL STRIP: ABNORMAL
WBC # UR STRIP: ABNORMAL /HPF
WBC NRBC COR # BLD: 5.62 10*3/MM3 (ref 3.4–10.8)

## 2022-07-09 PROCEDURE — 80053 COMPREHEN METABOLIC PANEL: CPT | Performed by: STUDENT IN AN ORGANIZED HEALTH CARE EDUCATION/TRAINING PROGRAM

## 2022-07-09 PROCEDURE — 82077 ASSAY SPEC XCP UR&BREATH IA: CPT | Performed by: STUDENT IN AN ORGANIZED HEALTH CARE EDUCATION/TRAINING PROGRAM

## 2022-07-09 PROCEDURE — 83735 ASSAY OF MAGNESIUM: CPT | Performed by: STUDENT IN AN ORGANIZED HEALTH CARE EDUCATION/TRAINING PROGRAM

## 2022-07-09 PROCEDURE — 63710000001 ONDANSETRON PER 8 MG: Performed by: PSYCHIATRY & NEUROLOGY

## 2022-07-09 PROCEDURE — 80306 DRUG TEST PRSMV INSTRMNT: CPT | Performed by: STUDENT IN AN ORGANIZED HEALTH CARE EDUCATION/TRAINING PROGRAM

## 2022-07-09 PROCEDURE — 87636 SARSCOV2 & INF A&B AMP PRB: CPT | Performed by: STUDENT IN AN ORGANIZED HEALTH CARE EDUCATION/TRAINING PROGRAM

## 2022-07-09 PROCEDURE — 25010000002 LORAZEPAM PER 2 MG: Performed by: NURSE PRACTITIONER

## 2022-07-09 PROCEDURE — 81001 URINALYSIS AUTO W/SCOPE: CPT | Performed by: STUDENT IN AN ORGANIZED HEALTH CARE EDUCATION/TRAINING PROGRAM

## 2022-07-09 PROCEDURE — 25010000002 THIAMINE PER 100 MG: Performed by: NURSE PRACTITIONER

## 2022-07-09 PROCEDURE — 99285 EMERGENCY DEPT VISIT HI MDM: CPT

## 2022-07-09 PROCEDURE — 81025 URINE PREGNANCY TEST: CPT | Performed by: STUDENT IN AN ORGANIZED HEALTH CARE EDUCATION/TRAINING PROGRAM

## 2022-07-09 PROCEDURE — 82077 ASSAY SPEC XCP UR&BREATH IA: CPT | Performed by: PHYSICIAN ASSISTANT

## 2022-07-09 PROCEDURE — 85025 COMPLETE CBC W/AUTO DIFF WBC: CPT | Performed by: STUDENT IN AN ORGANIZED HEALTH CARE EDUCATION/TRAINING PROGRAM

## 2022-07-09 PROCEDURE — 36415 COLL VENOUS BLD VENIPUNCTURE: CPT

## 2022-07-09 PROCEDURE — 93005 ELECTROCARDIOGRAM TRACING: CPT | Performed by: PSYCHIATRY & NEUROLOGY

## 2022-07-09 PROCEDURE — 82077 ASSAY SPEC XCP UR&BREATH IA: CPT | Performed by: NURSE PRACTITIONER

## 2022-07-09 RX ORDER — BENZONATATE 100 MG/1
100 CAPSULE ORAL 3 TIMES DAILY PRN
Status: DISCONTINUED | OUTPATIENT
Start: 2022-07-09 | End: 2022-07-14 | Stop reason: HOSPADM

## 2022-07-09 RX ORDER — ACETAMINOPHEN 500 MG
1000 TABLET ORAL ONCE
Status: COMPLETED | OUTPATIENT
Start: 2022-07-09 | End: 2022-07-09

## 2022-07-09 RX ORDER — MULTIPLE VITAMINS W/ MINERALS TAB 9MG-400MCG
1 TAB ORAL DAILY
COMMUNITY

## 2022-07-09 RX ORDER — MULTIPLE VITAMINS W/ MINERALS TAB 9MG-400MCG
1 TAB ORAL DAILY
Status: CANCELLED | OUTPATIENT
Start: 2022-07-09

## 2022-07-09 RX ORDER — FAMOTIDINE 20 MG/1
20 TABLET, FILM COATED ORAL 2 TIMES DAILY PRN
Status: DISCONTINUED | OUTPATIENT
Start: 2022-07-09 | End: 2022-07-14 | Stop reason: HOSPADM

## 2022-07-09 RX ORDER — LORAZEPAM 2 MG/1
2 TABLET ORAL
Status: DISPENSED | OUTPATIENT
Start: 2022-07-09 | End: 2022-07-10

## 2022-07-09 RX ORDER — BENZTROPINE MESYLATE 1 MG/ML
1 INJECTION INTRAMUSCULAR; INTRAVENOUS ONCE AS NEEDED
Status: DISCONTINUED | OUTPATIENT
Start: 2022-07-09 | End: 2022-07-14 | Stop reason: HOSPADM

## 2022-07-09 RX ORDER — IBUPROFEN 400 MG/1
800 TABLET ORAL ONCE
Status: COMPLETED | OUTPATIENT
Start: 2022-07-09 | End: 2022-07-09

## 2022-07-09 RX ORDER — SODIUM CHLORIDE 0.9 % (FLUSH) 0.9 %
10 SYRINGE (ML) INJECTION AS NEEDED
Status: DISCONTINUED | OUTPATIENT
Start: 2022-07-09 | End: 2022-07-09 | Stop reason: HOSPADM

## 2022-07-09 RX ORDER — LORAZEPAM 0.5 MG/1
0.5 TABLET ORAL EVERY 4 HOURS PRN
Status: ACTIVE | OUTPATIENT
Start: 2022-07-13 | End: 2022-07-14

## 2022-07-09 RX ORDER — MULTIPLE VITAMINS W/ MINERALS TAB 9MG-400MCG
1 TAB ORAL DAILY
Status: DISCONTINUED | OUTPATIENT
Start: 2022-07-09 | End: 2022-07-14 | Stop reason: HOSPADM

## 2022-07-09 RX ORDER — IBUPROFEN 400 MG/1
400 TABLET ORAL EVERY 6 HOURS PRN
Status: DISCONTINUED | OUTPATIENT
Start: 2022-07-09 | End: 2022-07-14 | Stop reason: HOSPADM

## 2022-07-09 RX ORDER — HYDROXYZINE 50 MG/1
50 TABLET, FILM COATED ORAL EVERY 6 HOURS PRN
Status: DISCONTINUED | OUTPATIENT
Start: 2022-07-09 | End: 2022-07-14 | Stop reason: HOSPADM

## 2022-07-09 RX ORDER — ACETAMINOPHEN 500 MG
1000 TABLET ORAL DAILY PRN
COMMUNITY
End: 2022-07-14 | Stop reason: HOSPADM

## 2022-07-09 RX ORDER — LORAZEPAM 2 MG/ML
1 INJECTION INTRAMUSCULAR ONCE
Status: COMPLETED | OUTPATIENT
Start: 2022-07-09 | End: 2022-07-09

## 2022-07-09 RX ORDER — ECHINACEA PURPUREA EXTRACT 125 MG
2 TABLET ORAL AS NEEDED
Status: DISCONTINUED | OUTPATIENT
Start: 2022-07-09 | End: 2022-07-14 | Stop reason: HOSPADM

## 2022-07-09 RX ORDER — LORAZEPAM 0.5 MG/1
0.5 TABLET ORAL
Status: COMPLETED | OUTPATIENT
Start: 2022-07-13 | End: 2022-07-13

## 2022-07-09 RX ORDER — ACETAMINOPHEN 500 MG
1000 TABLET ORAL DAILY PRN
Status: CANCELLED | OUTPATIENT
Start: 2022-07-09

## 2022-07-09 RX ORDER — LORAZEPAM 1 MG/1
1 TABLET ORAL
Status: COMPLETED | OUTPATIENT
Start: 2022-07-12 | End: 2022-07-12

## 2022-07-09 RX ORDER — BENZTROPINE MESYLATE 1 MG/1
2 TABLET ORAL ONCE AS NEEDED
Status: DISCONTINUED | OUTPATIENT
Start: 2022-07-09 | End: 2022-07-14 | Stop reason: HOSPADM

## 2022-07-09 RX ORDER — LORAZEPAM 1 MG/1
1 TABLET ORAL EVERY 4 HOURS PRN
Status: ACTIVE | OUTPATIENT
Start: 2022-07-12 | End: 2022-07-13

## 2022-07-09 RX ORDER — LORAZEPAM 2 MG/1
2 TABLET ORAL
Status: COMPLETED | OUTPATIENT
Start: 2022-07-10 | End: 2022-07-10

## 2022-07-09 RX ORDER — ACETAMINOPHEN 325 MG/1
650 TABLET ORAL EVERY 6 HOURS PRN
Status: DISCONTINUED | OUTPATIENT
Start: 2022-07-09 | End: 2022-07-14 | Stop reason: HOSPADM

## 2022-07-09 RX ORDER — LOPERAMIDE HYDROCHLORIDE 2 MG/1
2 CAPSULE ORAL
Status: DISCONTINUED | OUTPATIENT
Start: 2022-07-09 | End: 2022-07-14 | Stop reason: HOSPADM

## 2022-07-09 RX ORDER — ALUMINA, MAGNESIA, AND SIMETHICONE 2400; 2400; 240 MG/30ML; MG/30ML; MG/30ML
15 SUSPENSION ORAL EVERY 6 HOURS PRN
Status: DISCONTINUED | OUTPATIENT
Start: 2022-07-09 | End: 2022-07-14 | Stop reason: HOSPADM

## 2022-07-09 RX ORDER — LORAZEPAM 2 MG/1
2 TABLET ORAL EVERY 4 HOURS PRN
Status: ACTIVE | OUTPATIENT
Start: 2022-07-10 | End: 2022-07-11

## 2022-07-09 RX ORDER — ONDANSETRON 4 MG/1
4 TABLET, FILM COATED ORAL EVERY 6 HOURS PRN
Status: DISCONTINUED | OUTPATIENT
Start: 2022-07-09 | End: 2022-07-14 | Stop reason: HOSPADM

## 2022-07-09 RX ORDER — TRAZODONE HYDROCHLORIDE 50 MG/1
50 TABLET ORAL NIGHTLY PRN
Status: DISCONTINUED | OUTPATIENT
Start: 2022-07-09 | End: 2022-07-14 | Stop reason: HOSPADM

## 2022-07-09 RX ADMIN — Medication 100 MG: at 18:11

## 2022-07-09 RX ADMIN — IBUPROFEN 400 MG: 400 TABLET, FILM COATED ORAL at 18:13

## 2022-07-09 RX ADMIN — LORAZEPAM 2 MG: 2 TABLET ORAL at 20:21

## 2022-07-09 RX ADMIN — ONDANSETRON HYDROCHLORIDE 4 MG: 4 TABLET, FILM COATED ORAL at 18:13

## 2022-07-09 RX ADMIN — HYDROXYZINE HYDROCHLORIDE 50 MG: 50 TABLET ORAL at 18:13

## 2022-07-09 RX ADMIN — IBUPROFEN 800 MG: 400 TABLET, FILM COATED ORAL at 06:00

## 2022-07-09 RX ADMIN — Medication 1 TABLET: at 18:12

## 2022-07-09 RX ADMIN — LORAZEPAM 1 MG: 2 INJECTION INTRAMUSCULAR at 09:08

## 2022-07-09 RX ADMIN — LORAZEPAM 2 MG: 2 TABLET ORAL at 18:12

## 2022-07-09 RX ADMIN — ACETAMINOPHEN 1000 MG: 500 TABLET ORAL at 13:33

## 2022-07-09 RX ADMIN — SODIUM CHLORIDE 1000 ML: 9 INJECTION, SOLUTION INTRAVENOUS at 05:05

## 2022-07-09 RX ADMIN — THIAMINE HYDROCHLORIDE 1000 ML/HR: 100 INJECTION, SOLUTION INTRAMUSCULAR; INTRAVENOUS at 05:05

## 2022-07-09 NOTE — NURSING NOTE
Pt reports drinking 3 beers between 6-8pm 7/8/22. Pt loud, sexually suggestive, and rambling. Pt to be assessed when ETOH level results in case of intoxication.

## 2022-07-09 NOTE — NURSING NOTE
Pt has bruising to JANETTE arms and Rt shoulder area. Pt states she falls often since taking the Good's [employer]'s Covid vaccine. Pt barely able to stand, lost balance during search. Pt did not fall, but is very unsteady and has difficulty bearing weight. Pt partially noncompliant with not being able to stand/walk.

## 2022-07-09 NOTE — ED PROVIDER NOTES
Subjective     History provided by:  Patient  Alcohol Intoxication  Severity:  Moderate  Timing:  Constant  Context:  Pt reports she is here for alcohol detox. Patient states that she has been very depressed. Denies being SI/HI.   Associated symptoms: no abdominal pain, no chest pain, no congestion, no cough, no diarrhea, no ear pain, no fatigue, no fever, no headaches, no loss of consciousness, no myalgias, no nausea, no rash, no rhinorrhea, no shortness of breath, no sore throat, no vomiting and no wheezing        Review of Systems   Constitutional: Negative.  Negative for fatigue and fever.   HENT: Negative.  Negative for congestion, ear pain, rhinorrhea and sore throat.    Eyes: Negative.    Respiratory: Negative.  Negative for cough, shortness of breath and wheezing.    Cardiovascular: Negative.  Negative for chest pain.   Gastrointestinal: Negative.  Negative for abdominal pain, diarrhea, nausea and vomiting.   Endocrine: Negative.    Genitourinary: Negative.  Negative for dysuria.   Musculoskeletal: Negative for myalgias.   Skin: Negative.  Negative for rash.   Allergic/Immunologic: Negative.    Neurological: Negative.  Negative for loss of consciousness and headaches.   Hematological: Negative.    Psychiatric/Behavioral: Negative for suicidal ideas. The patient is nervous/anxious.    All other systems reviewed and are negative.      Past Medical History:   Diagnosis Date   • Alcohol abuse    • Alcoholism (HCC)    • Alcoholism /alcohol abuse    • Anxiety    • Cancer (HCC)     Cervical    • Chronic pain disorder     back and neck   • COPD (chronic obstructive pulmonary disease) (HCC)    • Depression    • Elevated liver enzymes    • H/O cardiovascular stress test 03/22/2016    WNL   • Hyperlipidemia    • Hypertension    • Insomnia    • Left knee pain    • Lower back pain    • Neck pain    • Psoriasis    • Tobacco abuse    • Withdrawal symptoms, alcohol (HCC)        No Known Allergies    Past Surgical History:    Procedure Laterality Date   •  SECTION     • HYSTERECTOMY      Total    • TIBIA FRACTURE SURGERY      vicki right leg   • TUMOR REMOVAL      Uterine (x 2)        Family History   Problem Relation Age of Onset   • Coronary artery disease Mother    • Cancer Mother         breast   • No Known Problems Father    • Seizures Maternal Aunt    • No Known Problems Sister    • No Known Problems Brother    • No Known Problems Brother        Social History     Socioeconomic History   • Marital status: Single   Tobacco Use   • Smoking status: Current Every Day Smoker     Packs/day: 1.50     Years: 35.00     Pack years: 52.50     Types: Cigarettes   • Smokeless tobacco: Never Used   • Tobacco comment: smoking since 17years old   Substance and Sexual Activity   • Alcohol use: Yes     Alcohol/week: 42.0 standard drinks     Types: 42 Cans of beer per week     Comment: 0.5 pint - [employer] - Daily    • Drug use: No   • Sexual activity: Defer           Objective   Physical Exam  Vitals and nursing note reviewed.   Constitutional:       General: She is not in acute distress.     Appearance: She is well-developed. She is not diaphoretic.   HENT:      Head: Normocephalic and atraumatic.      Right Ear: External ear normal.      Left Ear: External ear normal.      Nose: Nose normal.   Eyes:      Conjunctiva/sclera: Conjunctivae normal.      Pupils: Pupils are equal, round, and reactive to light.   Neck:      Vascular: No JVD.      Trachea: No tracheal deviation.   Cardiovascular:      Rate and Rhythm: Normal rate and regular rhythm.      Heart sounds: Normal heart sounds. No murmur heard.  Pulmonary:      Effort: Pulmonary effort is normal. No respiratory distress.      Breath sounds: Normal breath sounds. No wheezing.   Abdominal:      General: Bowel sounds are normal.      Palpations: Abdomen is soft.      Tenderness: There is no abdominal tenderness.   Musculoskeletal:         General: No deformity. Normal range of  motion.      Cervical back: Normal range of motion and neck supple.   Skin:     General: Skin is warm and dry.      Capillary Refill: Capillary refill takes less than 2 seconds.      Coloration: Skin is not pale.      Findings: No erythema or rash.   Neurological:      General: No focal deficit present.      Mental Status: She is alert and oriented to person, place, and time. Mental status is at baseline.      Cranial Nerves: No cranial nerve deficit.   Psychiatric:         Mood and Affect: Mood normal.         Behavior: Behavior normal.         Thought Content: Thought content normal.         Procedures       Results for orders placed or performed during the hospital encounter of 07/09/22   COVID-19 and FLU A/B PCR - Swab, Nasopharynx    Specimen: Nasopharynx; Swab   Result Value Ref Range    COVID19 Not Detected Not Detected - Ref. Range    Influenza A PCR Not Detected Not Detected    Influenza B PCR Not Detected Not Detected   Comprehensive Metabolic Panel    Specimen: Arm, Right; Blood   Result Value Ref Range    Glucose 117 (H) 65 - 99 mg/dL    BUN 12 6 - 20 mg/dL    Creatinine 0.77 0.57 - 1.00 mg/dL    Sodium 133 (L) 136 - 145 mmol/L    Potassium 4.3 3.5 - 5.2 mmol/L    Chloride 97 (L) 98 - 107 mmol/L    CO2 23.7 22.0 - 29.0 mmol/L    Calcium 9.2 8.6 - 10.5 mg/dL    Total Protein 7.6 6.0 - 8.5 g/dL    Albumin 4.29 3.50 - 5.20 g/dL    ALT (SGPT) 33 1 - 33 U/L    AST (SGOT) 62 (H) 1 - 32 U/L    Alkaline Phosphatase 128 (H) 39 - 117 U/L    Total Bilirubin 0.2 0.0 - 1.2 mg/dL    Globulin 3.3 gm/dL    A/G Ratio 1.3 g/dL    BUN/Creatinine Ratio 15.6 7.0 - 25.0    Anion Gap 12.3 5.0 - 15.0 mmol/L    eGFR 91.8 >60.0 mL/min/1.73   Urinalysis With Microscopic If Indicated (No Culture) - Urine, Clean Catch    Specimen: Urine, Clean Catch   Result Value Ref Range    Color, UA Yellow Yellow, Straw    Appearance, UA Clear Clear    pH, UA <=5.0 5.0 - 8.0    Specific Gravity, UA 1.007 1.005 - 1.030    Glucose, UA Negative  Negative    Ketones, UA Negative Negative    Bilirubin, UA Negative Negative    Blood, UA Negative Negative    Protein, UA Negative Negative    Leuk Esterase, UA Negative Negative    Nitrite, UA Positive (A) Negative    Urobilinogen, UA 0.2 E.U./dL 0.2 - 1.0 E.U./dL   Urine Drug Screen - Urine, Clean Catch    Specimen: Urine, Clean Catch   Result Value Ref Range    THC, Screen, Urine Negative Negative    Phencyclidine (PCP), Urine Negative Negative    Cocaine Screen, Urine Negative Negative    Methamphetamine, Ur Negative Negative    Opiate Screen Negative Negative    Amphetamine Screen, Urine Negative Negative    Benzodiazepine Screen, Urine Negative Negative    Tricyclic Antidepressants Screen Negative Negative    Methadone Screen, Urine Negative Negative    Barbiturates Screen, Urine Negative Negative    Oxycodone Screen, Urine Negative Negative    Propoxyphene Screen Negative Negative    Buprenorphine, Screen, Urine Negative Negative   Ethanol    Specimen: Arm, Right; Blood   Result Value Ref Range    Ethanol 345 (H) 0 - 10 mg/dL    Ethanol % 0.345 %   Magnesium    Specimen: Arm, Right; Blood   Result Value Ref Range    Magnesium 1.8 1.6 - 2.6 mg/dL   Pregnancy, Urine - Urine, Clean Catch    Specimen: Urine, Clean Catch   Result Value Ref Range    HCG, Urine QL Negative Negative   CBC Auto Differential    Specimen: Blood   Result Value Ref Range    WBC 5.62 3.40 - 10.80 10*3/mm3    RBC 3.97 3.77 - 5.28 10*6/mm3    Hemoglobin 14.5 12.0 - 15.9 g/dL    Hematocrit 42.2 34.0 - 46.6 %    .3 (H) 79.0 - 97.0 fL    MCH 36.5 (H) 26.6 - 33.0 pg    MCHC 34.4 31.5 - 35.7 g/dL    RDW 11.9 (L) 12.3 - 15.4 %    RDW-SD 47.7 37.0 - 54.0 fl    MPV 9.2 6.0 - 12.0 fL    Platelets 193 140 - 450 10*3/mm3    Neutrophil % 52.6 42.7 - 76.0 %    Lymphocyte % 34.9 19.6 - 45.3 %    Monocyte % 9.8 5.0 - 12.0 %    Eosinophil % 1.4 0.3 - 6.2 %    Basophil % 1.1 0.0 - 1.5 %    Immature Grans % 0.2 0.0 - 0.5 %    Neutrophils, Absolute  2.96 1.70 - 7.00 10*3/mm3    Lymphocytes, Absolute 1.96 0.70 - 3.10 10*3/mm3    Monocytes, Absolute 0.55 0.10 - 0.90 10*3/mm3    Eosinophils, Absolute 0.08 0.00 - 0.40 10*3/mm3    Basophils, Absolute 0.06 0.00 - 0.20 10*3/mm3    Immature Grans, Absolute 0.01 0.00 - 0.05 10*3/mm3    nRBC 0.0 0.0 - 0.2 /100 WBC   Urinalysis, Microscopic Only - Urine, Clean Catch    Specimen: Urine, Clean Catch   Result Value Ref Range    RBC, UA None Seen None Seen, 0-2 /HPF    WBC, UA 0-2 None Seen, 0-2 /HPF    Bacteria, UA 2+ (A) None Seen /HPF    Squamous Epithelial Cells, UA 0-2 None Seen, 0-2 /HPF    Hyaline Casts, UA None Seen None Seen /LPF    Methodology Automated Microscopy    Ethanol    Specimen: Hand, Right; Blood   Result Value Ref Range    Ethanol 167 (H) 0 - 10 mg/dL    Ethanol % 0.167 %   Ethanol    Specimen: Hand, Right; Blood   Result Value Ref Range    Ethanol 58 (H) 0 - 10 mg/dL    Ethanol % 0.058 %       ED Course  ED Course as of 07/09/22 1314   Sat Jul 09, 2022   0633 Patient waiting for ETOH level to trend down. [MB]      ED Course User Index  [MB] Ame Hernandez APRN                                           MDM  Number of Diagnoses or Management Options  Alcohol abuse: established and worsening     Amount and/or Complexity of Data Reviewed  Clinical lab tests: reviewed and ordered        Final diagnoses:   Alcohol abuse       ED Disposition  ED Disposition     ED Disposition   DC/Transfer to Behavioral Health    Condition   Stable    Comment   --             No follow-up provider specified.       Medication List      No changes were made to your prescriptions during this visit.          Yasir Albright PA  07/09/22 1314

## 2022-07-09 NOTE — ED NOTES
Pt assisted into wheelchair by myself and Martínez ALEJANDRE and transported to Intake. Pt A&OX4. VSS. NADN. Respirations even and nonlabored. IV removed per protocol.

## 2022-07-09 NOTE — NURSING NOTE
Full intake assessment completed.Patient presented to ED desiring to detox from alcohol. Patient reported she drinks 3-4 Tall Boy beers daily for the last 1.5 years . Patient reported she began drinking after the death of her son in 2015. She reported her usage has wavered from time to time with a 7 day sobriety period at sometime. Patient CIWA 18 cravings 3/10. Patient reported she is depressed and rated depression at 7/10. Patient rated anxiety 9/10. Patient denies,HI,drugs, and AVH. Reported poor sleep and appetite. She has a history of admissions last one on 08/03/2018 4 days Patient reports ambulatory issues and is unsteady on her feet. Patient denies medical stressors,OCD,PTSD and stacie

## 2022-07-10 LAB
QT INTERVAL: 388 MS
QTC INTERVAL: 447 MS

## 2022-07-10 PROCEDURE — 99223 1ST HOSP IP/OBS HIGH 75: CPT | Performed by: PSYCHIATRY & NEUROLOGY

## 2022-07-10 PROCEDURE — 93010 ELECTROCARDIOGRAM REPORT: CPT | Performed by: SPECIALIST

## 2022-07-10 RX ADMIN — HYDROXYZINE HYDROCHLORIDE 50 MG: 50 TABLET ORAL at 08:05

## 2022-07-10 RX ADMIN — IBUPROFEN 400 MG: 400 TABLET, FILM COATED ORAL at 00:48

## 2022-07-10 RX ADMIN — Medication 1 TABLET: at 08:04

## 2022-07-10 RX ADMIN — LORAZEPAM 2 MG: 2 TABLET ORAL at 14:05

## 2022-07-10 RX ADMIN — LORAZEPAM 2 MG: 2 TABLET ORAL at 21:20

## 2022-07-10 RX ADMIN — TRAZODONE HYDROCHLORIDE 50 MG: 50 TABLET ORAL at 21:21

## 2022-07-10 RX ADMIN — ACETAMINOPHEN 650 MG: 325 TABLET ORAL at 21:20

## 2022-07-10 RX ADMIN — IBUPROFEN 400 MG: 400 TABLET, FILM COATED ORAL at 08:05

## 2022-07-10 RX ADMIN — HYDROXYZINE HYDROCHLORIDE 50 MG: 50 TABLET ORAL at 21:20

## 2022-07-10 RX ADMIN — LORAZEPAM 2 MG: 2 TABLET ORAL at 08:04

## 2022-07-10 RX ADMIN — HYDROXYZINE HYDROCHLORIDE 50 MG: 50 TABLET ORAL at 00:48

## 2022-07-10 RX ADMIN — Medication 100 MG: at 08:04

## 2022-07-10 NOTE — PLAN OF CARE
Problem: Adult Behavioral Health Plan of Care  Goal: Plan of Care Review  Outcome: Ongoing, Progressing  Flowsheets (Taken 7/10/2022 1255)  Consent Given to Review Plan with: Sister Tayla  Progress: no change  Plan of Care Reviewed With:   patient   sibling  Patient Agreement with Plan of Care: agrees  Outcome Evaluation: New admit. Completed social history and integrated summary  Goal: Patient-Specific Goal (Individualization)  Outcome: Ongoing, Progressing  Flowsheets  Taken 7/10/2022 1255  Patient-Specific Goals (Include Timeframe): Patient will deny SI/HI prior to discharge. Patient will identify 1 healthy coping skill for relapse prevention prior to discharge. Patient will consent to appropriate aftercare plan prior to discharge.  Individualized Care Needs: Therapist will offer 1-4 individual sessions, family education, aftercare planning.  Anxieties, Fears or Concerns: None reported  Taken 7/10/2022 1249  Patient Personal Strengths:   expressive of emotions   expressive of needs   family/social support   motivated for treatment   resilient   resourceful   stable living environment   spiritual/[place]ian support   realistic evaluation of current/future capabilities   positive attitude  Patient Vulnerabilities:   substance abuse/addiction   traumatic event   poor physical health   history of unsuccessful treatment  Goal: Optimized Coping Skills in Response to Life Stressors  Outcome: Ongoing, Progressing  Intervention: Promote Effective Coping Strategies  Flowsheets (Taken 7/10/2022 1255)  Supportive Measures:   active listening utilized   counseling provided  Goal: Develops/Participates in Therapeutic Wolverine to Support Successful Transition  Outcome: Ongoing, Progressing  Intervention: Foster Therapeutic Wolverine  Flowsheets (Taken 7/10/2022 1255)  Trust Relationship/Rapport:   care explained   empathic listening provided   choices provided   questions answered   emotional support provided   questions  encouraged   reassurance provided   thoughts/feelings acknowledged  Intervention: Mutually Develop Transition Plan  Flowsheets  Taken 7/10/2022 1255  Outpatient/Agency/Support Group Needs:   outpatient medication management   outpatient substance abuse treatment (specify)   outpatient counseling   outpatient psychiatric care (specify)  Transition Support:   community resources reviewed   crisis management plan verbalized   follow-up care coordinated   crisis management plan promoted   follow-up care discussed  Anticipated Discharge Disposition: home or self-care  Taken 7/10/2022 1253  Discharge Coordination/Progress: Patient has [employer] insurance for discharge planning and would benefit from residential treatment. she has signed consent for outpatient substance abuse treatment at [facility].  Concerns Comments: NA  Transportation Anticipated: family or friend will provide  Transportation Concerns: no car  Current Discharge Risk:   substance use/abuse   psychiatric illness   chronically ill  Concerns to be Addressed:   substance/tobacco abuse/use   mental health   coping/stress   compliance issue   discharge planning   home safety  Readmission Within the Last 30 Days: no previous admission in last 30 days  Patient/Family Anticipated Services at Transition:   outpatient care   mental health services     Patient's Choice of Community Agency(s): [facility]  Patient/Family Anticipates Transition to: home  Offered/Gave Vendor List: no   Goal Outcome Evaluation:  Plan of Care Reviewed With: patient, sibling  Patient Agreement with Plan of Care: agrees  Consent Given to Review Plan with: Sister Tayla  Progress: no change  Outcome Evaluation: New admit. Completed social history and integrated summary

## 2022-07-10 NOTE — PLAN OF CARE
Goal Outcome Evaluation:  Plan of Care Reviewed With: patient  Patient Agreement with Plan of Care: agrees     Progress: improving     Pt had difficulty sleeping, appetite is good, and refused group.
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
## 2022-07-10 NOTE — PROGRESS NOTES
1232:    DATA:      Therapist met individually with patient this date to introduce role and to discuss hospitalization expectations. Patient agreeable. Reviewed medical record and staffed case with treatment team this date. No major issues identified.       Clinical Maneuvering/Intervention:     Therapist assisted patient in processing above session content; acknowledged and normalized patient’s thoughts, feelings, and concerns.  Discussed the therapist/patient relationship and explain the parameters and limitations of relative confidentiality.  Also discussed the importance of active participation, and honesty to the treatment process.  Encouraged the patient to discuss/vent their feelings, frustrations, and fears concerning their ongoing medical issues and validated their feelings.     Allowed patient to freely discuss issues without interruption or judgment. Provided safe, confidential environment to facilitate the development of positive therapeutic relationship and encourage open, honest communication.     Concern was voiced regarding substance use and educated patient on risks associated with use. Patient was advised to abstain from use and educated on community resources that can help with sobriety and recovery.       Therapist addressed discharge safety planning this date. Assisted patient in identifying risk factors which would indicate the need for higher level of care after discharge;  including thoughts to harm self or others and/or self-harming behavior. Encouraged patient to call 911, or present to the nearest emergency room should any of these events occur. Discussed crisis intervention services and means to access.  Encouraged securing any objects of harm.       Therapist completed integrated summary, treatment plan, and initiated social history this date.  Therapist is strongly encouraging family involvement in treatment.       ASSESSMENT:      The patient is a 56 year old  female.  Per  "report from ED, \"Full intake assessment completed.Patient presented to ED desiring to detox from alcohol. Patient reported she drinks 3-4 [place] beers daily for the last 1.5 years . Patient reported she began drinking after the death of her son in 2015. She reported her usage has wavered from time to time with a 7 day sobriety period at sometime. Patient CIWA 18 cravings 3/10. Patient reported she is depressed and rated depression at 7/10. Patient rated anxiety 9/10. Patient denies,HI,drugs, and AVH. Reported poor sleep and appetite. She has a history of admissions last one on 08/03/2018 4 days Patient reports ambulatory issues and is unsteady on her feet. Patient denies medical stressors,OCD,PTSD and man.\"     Today, patient was seen 1-1 in the office. Patient noted to be casually dressed and being assisted by a walker. Patient calm/cooperative and oriented x 4.  Patient reports that she has been drinking too much. Yesterday she called a  who is a friend and asked for help getting to hospital.  Patient lives alone in [facility] and reports that she has been drinking to cope with grief follow the death of her 22 year old son in 2015 from an MVA.  Patient denies other substance abuse. She denies SI/HI/AVH.  . Patient rates her cravings on a scale of 1/10 with 10 being the most severe a 3. Patient's CIWA was 18. Lats use 7-9-22. Patient reports that she hopes to detox and then return home.      Patient signed consent for her sister Tayla at [phone]; Tayla was contacted this date. Tayla reports that she is supportive of patient and concerned about her drinking.  She reports that patient and her brother often drink together. Patient drinks daily.  Tayla identified that as far as she knows patient can return home at discharge.  Other than her drinking, no safety concerns this date.          Goals for treatment: Detox from beer      Prior Hospitalizations / Dates: [facility] 2018; detox    "   Childhood History:  Born and raised in [place].  Raised by parents.  Good childhood.       Suicide Attempts:  Denies      Alcohol:  Drinking beer; 3 large cans daily.  Longest time of sobriety 9 days.      Substance use: denies     Legal:  Denies      Sexual:  .  Children; age 22  due to MVA       Education:   11th grade. No income.  Brother helps.       Access to firearms:  denies         PLAN:       Patient to remain hospitalized this date.      Treatment team will focus efforts on stabilizing patient's acute symptoms while providing education on healthy coping and crisis management to reduce hospitalizations.   Patient requires daily psychiatrist evaluation and / nursing supervision to promote patient  safety.     Therapist will offer 1-4 individual sessions, family education, and appropriate referral.     Therapist recommends residential referral.  Patient declines and signed consent for [facility].  Patient to return to her own home at discharge. She has been encouraged to safe guard from alcohol and to also safe guard from weapons/firearms. Patient agreeable.   Patient's sister Tayla confirms that she can return home at discharge. Patient would also likely benefit from case management.

## 2022-07-10 NOTE — PLAN OF CARE
Problem: Adult Behavioral Health Plan of Care  Goal: Plan of Care Review  7/10/2022 1904 by So Stover RN  Outcome: Ongoing, Progressing  Flowsheets (Taken 7/10/2022 1800)  Progress: no change  Plan of Care Reviewed With: patient  Patient Agreement with Plan of Care: agrees  Outcome Evaluation: pt calm and cooperative.   Goal Outcome Evaluation:  Plan of Care Reviewed With: patient  Patient Agreement with Plan of Care: agrees     Progress: no change  Outcome Evaluation: pt calm and cooperative.

## 2022-07-10 NOTE — PLAN OF CARE
Problem: Adult Behavioral Health Plan of Care  Goal: Plan of Care Review  Outcome: Ongoing, Progressing  Flowsheets  Taken 7/10/2022 1725 by So Stover, RN  Plan of Care Reviewed With: patient  Patient Agreement with Plan of Care: agrees  Outcome Evaluation: pt pleasant and cooperative.uses walker for ambulation.  Taken 7/10/2022 1255 by Sarah Linn  Progress: no change  Taken 7/10/2022 0726 by So Stover, RN  Plan of Care Reviewed With: patient  Patient Agreement with Plan of Care: agrees   Goal Outcome Evaluation:  Plan of Care Reviewed With: patient  Patient Agreement with Plan of Care: agrees        Outcome Evaluation: pt pleasant and cooperative.uses walker for ambulation.

## 2022-07-11 PROCEDURE — 99232 SBSQ HOSP IP/OBS MODERATE 35: CPT | Performed by: PSYCHIATRY & NEUROLOGY

## 2022-07-11 RX ORDER — CHLORDIAZEPOXIDE HYDROCHLORIDE 25 MG/1
25 CAPSULE, GELATIN COATED ORAL ONCE
Status: COMPLETED | OUTPATIENT
Start: 2022-07-11 | End: 2022-07-11

## 2022-07-11 RX ADMIN — LORAZEPAM 1.5 MG: 1 TABLET ORAL at 11:53

## 2022-07-11 RX ADMIN — LORAZEPAM 1.5 MG: 1 TABLET ORAL at 08:15

## 2022-07-11 RX ADMIN — HYDROXYZINE HYDROCHLORIDE 50 MG: 50 TABLET ORAL at 08:15

## 2022-07-11 RX ADMIN — Medication 100 MG: at 08:15

## 2022-07-11 RX ADMIN — Medication 1 TABLET: at 08:14

## 2022-07-11 RX ADMIN — IBUPROFEN 400 MG: 400 TABLET, FILM COATED ORAL at 21:17

## 2022-07-11 RX ADMIN — IBUPROFEN 400 MG: 400 TABLET, FILM COATED ORAL at 08:15

## 2022-07-11 RX ADMIN — ACETAMINOPHEN 650 MG: 325 TABLET ORAL at 11:53

## 2022-07-11 RX ADMIN — LORAZEPAM 1.5 MG: 1 TABLET ORAL at 14:02

## 2022-07-11 RX ADMIN — LORAZEPAM 1.5 MG: 1 TABLET ORAL at 21:17

## 2022-07-11 RX ADMIN — TRAZODONE HYDROCHLORIDE 50 MG: 50 TABLET ORAL at 21:17

## 2022-07-11 RX ADMIN — CHLORDIAZEPOXIDE HYDROCHLORIDE 25 MG: 25 CAPSULE ORAL at 11:53

## 2022-07-11 NOTE — PROGRESS NOTES
"INPATIENT PSYCHIATRIC PROGRESS NOTE    Name:  Ailyn Samano  :  1965  MRN:  [id number]  Visit Number:  [id number]  Length of stay:  2    SUBJECTIVE  CC/Focus of Exam: alcohol use    INTERVAL HISTORY:  The patient states she is feeling a lot better. She states she came to the hospital because she was drinking alcohol.   Depression rating 7/10  Anxiety rating 9/10  Sleep: not good  Withdrawal sx:a little, not near as bad  Cravin/10    Review of Systems   Respiratory: Negative.    Cardiovascular: Negative.    Gastrointestinal: Negative.    Musculoskeletal: Positive for gait problem.   Psychiatric/Behavioral: Positive for dysphoric mood. The patient is nervous/anxious.        OBJECTIVE    Temp:  [97 °F (36.1 °C)-97.9 °F (36.6 °C)] 97.3 °F (36.3 °C)  Heart Rate:  [] 128  Resp:  [16-18] 18  BP: (128-152)/(81-95) 144/93    MENTAL STATUS EXAM:  Appearance:Casually dressed, good hygeine.   Cooperation:Cooperative  Psychomotor: No psychomotor agitation/retardation, No EPS, No motor tics  Speech-normal rate, amount.  Mood \"anxious\"   Affect-congruent, appropriate, stable  Thought Content-goal directed, no delusional material present  Thought process-linear, organized.  Suicidality: No SI  Homicidality: No HI  Perception: No AH/VH  Insight-fair   Judgement-fair    Lab Results (last 24 hours)     ** No results found for the last 24 hours. **             Imaging Results (Last 24 Hours)     ** No results found for the last 24 hours. **             ECG/EMG Results (most recent)     Procedure Component Value Units Date/Time    ECG 12 Lead [[id number]] Collected: 07/10/22 1020     Updated: 07/10/22 1418     QT Interval 388 ms      QTC Interval 447 ms     Narrative:      Test Reason : Baseline Cardiac Status  Blood Pressure :   */*   mmHG  Vent. Rate :  80 BPM     Atrial Rate :  80 BPM     P-R Int : 168 ms          QRS Dur :  82 ms      QT Int : 388 ms       P-R-T Axes :  61  56  58 degrees     QTc Int : 447 " ms    Normal sinus rhythm  Possible Left atrial enlargement  Borderline ECG  When compared with ECG of 10-JUL-2022 10:20, (Unconfirmed)  No significant change was found  Confirmed by Olya Stubbs () on 7/10/2022 2:17:56 PM    Referred By: ONEIL           Confirmed By: Olya Stubbs           ALLERGIES: Patient has no known allergies.      Current Facility-Administered Medications:   •  acetaminophen (TYLENOL) tablet 650 mg, 650 mg, Oral, Q6H PRN, Jason Acuna MD, 650 mg at 07/10/22 2120  •  aluminum-magnesium hydroxide-simethicone (MAALOX MAX) 400-400-40 MG/5ML suspension 15 mL, 15 mL, Oral, Q6H PRN, Jason Acuna MD  •  benzonatate (TESSALON) capsule 100 mg, 100 mg, Oral, TID PRN, Jason Acuna MD  •  benztropine (COGENTIN) tablet 2 mg, 2 mg, Oral, Once PRN **OR** benztropine (COGENTIN) injection 1 mg, 1 mg, Intramuscular, Once PRN, Jason Acuna MD  •  famotidine (PEPCID) tablet 20 mg, 20 mg, Oral, BID PRN, Jason Acuna MD  •  hydrOXYzine (ATARAX) tablet 50 mg, 50 mg, Oral, Q6H PRN, Jason Acuna MD, 50 mg at 22  •  ibuprofen (ADVIL,MOTRIN) tablet 400 mg, 400 mg, Oral, Q6H PRN, Jason Acuna MD, 400 mg at 22  •  loperamide (IMODIUM) capsule 2 mg, 2 mg, Oral, Q2H PRN, Jason Acuna MD  •  [COMPLETED] LORazepam (ATIVAN) tablet 2 mg, 2 mg, Oral, 3 times per day, 2 mg at 07/10/22 2120 **FOLLOWED BY** LORazepam (ATIVAN) tablet 1.5 mg, 1.5 mg, Oral, 3 times per day, 1.5 mg at 22 **FOLLOWED BY** [START ON 2022] LORazepam (ATIVAN) tablet 1 mg, 1 mg, Oral, 3 times per day **FOLLOWED BY** [START ON 2022] LORazepam (ATIVAN) tablet 0.5 mg, 0.5 mg, Oral, 3 times per day, Jason Acuna MD  •  [] LORazepam (ATIVAN) tablet 2 mg, 2 mg, Oral, Q4H PRN **FOLLOWED BY** LORazepam (ATIVAN) tablet 1.5 mg, 1.5 mg, Oral, Q4H PRN **FOLLOWED BY** [START ON 2022] LORazepam (ATIVAN) tablet 1 mg, 1 mg, Oral, Q4H PRN **FOLLOWED BY** [START ON  7/13/2022] LORazepam (ATIVAN) tablet 0.5 mg, 0.5 mg, Oral, Q4H PRN, Jason Acuna MD  •  magnesium hydroxide (MILK OF MAGNESIA) suspension 10 mL, 10 mL, Oral, Daily PRN, Jason Acuna MD  •  multivitamin with minerals 1 tablet, 1 tablet, Oral, Daily, Jason Acuna MD, 1 tablet at 07/11/22 0814  •  ondansetron (ZOFRAN) tablet 4 mg, 4 mg, Oral, Q6H PRN, Jason Acuna MD, 4 mg at 07/09/22 1813  •  sodium chloride nasal spray 2 spray, 2 spray, Each Nare, PRN, Jason Acuna MD  •  thiamine (VITAMIN B-1) tablet 100 mg, 100 mg, Oral, Daily, Jason Acuna MD, 100 mg at 07/11/22 0815  •  traZODone (DESYREL) tablet 50 mg, 50 mg, Oral, Nightly PRN, Jason Acuna MD, 50 mg at 07/10/22 2121    ASSESSMENT & PLAN:    Alcohol use disorder severe  - Continue Ativan detox  - thiamine and folate    Weakness, generalized  - Encourage po intake  - Patient is using a walker to help her ambulate    Nicotine use disorder  - Encourage cessation      Special precautions: Special Precautions Level 4 (q30 min checks).    Behavioral Health Treatment Plan and Problem List: I have reviewed and approved the Behavioral Health Treatment Plan and Problem list.  The patient has had a chance to review and agrees with the treatment plan.     Clinician:  Andrew Stewart MD  07/11/22  11:29 EDT

## 2022-07-11 NOTE — PLAN OF CARE
Problem: Adult Behavioral Health Plan of Care  Goal: Patient-Specific Goal (Individualization)  Outcome: Ongoing, Progressing  Flowsheets  Taken 7/10/2022 1255 by Sarah Linn  Patient-Specific Goals (Include Timeframe): Patient will deny SI/HI prior to discharge. Patient will identify 1 healthy coping skill for relapse prevention prior to discharge. Patient will consent to appropriate aftercare plan prior to discharge.  Individualized Care Needs: Therapist will offer 1-4 individual sessions, family education, aftercare planning.  Anxieties, Fears or Concerns: None reported  Taken 7/10/2022 1249 by Sarah Linn  Patient Personal Strengths:   expressive of emotions   expressive of needs   family/social support   motivated for treatment   resilient   resourceful   stable living environment   spiritual/Christianity support   realistic evaluation of current/future capabilities   positive attitude  Patient Vulnerabilities:   substance abuse/addiction   traumatic event   poor physical health   history of unsuccessful treatment     Problem: Adult Behavioral Health Plan of Care  Goal: Optimized Coping Skills in Response to Life Stressors  Outcome: Ongoing, Progressing  Flowsheets (Taken 7/11/2022 1423)  Optimized Coping Skills in Response to Life Stressors: making progress toward outcome     Problem: Adult Behavioral Health Plan of Care  Goal: Optimized Coping Skills in Response to Life Stressors  Intervention: Promote Effective Coping Strategies  Flowsheets (Taken 7/11/2022 1423)  Supportive Measures:   active listening utilized   positive reinforcement provided   self-responsibility promoted   counseling provided   problem-solving facilitated   verbalization of feelings encouraged   decision-making supported   goal-setting facilitated   self-care encouraged   self-reflection promoted     Problem: Adult Behavioral Health Plan of Care  Goal: Develops/Participates in Therapeutic [place] to Support  Successful Transition  Outcome: Ongoing, Progressing  Flowsheets (Taken 7/11/2022 1423)  Develops/Participates in Therapeutic [place] to Support Successful Transition: making progress toward outcome     Problem: Adult Behavioral Health Plan of Care  Goal: Develops/Participates in Therapeutic [place] to Support Successful Transition  Intervention: Foster Therapeutic [place]  Flowsheets (Taken 7/11/2022 1423)  Trust Relationship/Rapport:   care explained   reassurance provided   choices provided   thoughts/feelings acknowledged   emotional support provided   empathic listening provided   questions answered   questions encouraged     Problem: Adult Behavioral Health Plan of Care  Goal: Develops/Participates in Therapeutic [place] to Support Successful Transition  Intervention: Mutually Develop Transition Plan  Flowsheets  Taken 7/11/2022 1423  Transition Support:   community resources reviewed   crisis management plan promoted   crisis management plan verbalized   follow-up care coordinated   follow-up care discussed  Taken 7/11/2022 1422  Transportation Anticipated: family or friend will provide  Transportation Concerns: no car  Current Discharge Risk:   psychiatric illness   substance use/abuse   chronically ill  Concerns to be Addressed:   substance/tobacco abuse/use   mental health   coping/stress   compliance issue   discharge planning   home safety  Readmission Within the Last 30 Days: no previous admission in last 30 days  Patient/Family Anticipated Services at Transition:   outpatient care   mental health services  Patient/Family Anticipates Transition to: home  Offered/Gave Vendor List: no     Data:  Therapist discussed patient with nursing staff and met with patient along with Dr. Stewart this date to further discuss patient progress, review healthy coping and safe disposition.      Clinical Maneuvering/Intervention:    Therapist assisted patient in processing above session content; acknowledged and  normalized patient's thoughts, feelings and concerns.  Encouraged patient to discuss/vent feelings, frustrations, and fears concerning their ongoing issues and validated patients feelings.  Discussed the importance of healthy coping and reviewed healthy coping skills such as distraction, thought reframing/redirecting, grounding, mindfulness, etc.  Reviewed safe disposition with patient.    Assessment:  Patient denies suicidal ideation/homicidal ideation.  Patient reports decrease in depression and anxiety today.  Patient states she is feeling very weak.  She discussed she is having decrease in cravings and withdrawals.  She attempted to contact her family today and was unable to reach them.  She discussed that her sister prefers to text.  She discussed that she lives near [facility] in [place] and she plans to attend AA meetings at their facility.  She discussed some old behaviors she would need to work on changing.    Plan:  Patient will continue hospitalization/medication management. Patient will return home upon stabilization.  Patient will engage in aftercare with CR and AA near her home.

## 2022-07-11 NOTE — NURSING NOTE
Contacted Dr. Stewart via telephone conversation to inform of patients B/p (144/93) and pulse (128).

## 2022-07-11 NOTE — PLAN OF CARE
Goal Outcome Evaluation:  Plan of Care Reviewed With: patient  Patient Agreement with Plan of Care: agrees     Progress: improving  Outcome Evaluation:  (Patient is cooperative, she has been in and out of the day room throughtout the day interacting with peers and watching tv. Denies SI, HI rates cravings 0, anxiety 3 depression1, she is AOx4.)

## 2022-07-11 NOTE — PLAN OF CARE
Goal Outcome Evaluation:  Plan of Care Reviewed With: patient  Patient Agreement with Plan of Care: agrees     Progress: improving  Outcome Evaluation: Pt rates anxiety 1 and depression 2, says that she is feeling better today. Pt cooperative with staff and interacts appropriately with her peers.   Pt continuing to have difficulty sleeping and was up and down throughout the night.

## 2022-07-12 PROCEDURE — 99232 SBSQ HOSP IP/OBS MODERATE 35: CPT | Performed by: PSYCHIATRY & NEUROLOGY

## 2022-07-12 RX ADMIN — HYDROXYZINE HYDROCHLORIDE 50 MG: 50 TABLET ORAL at 21:23

## 2022-07-12 RX ADMIN — HYDROXYZINE HYDROCHLORIDE 50 MG: 50 TABLET ORAL at 08:04

## 2022-07-12 RX ADMIN — IBUPROFEN 400 MG: 400 TABLET, FILM COATED ORAL at 21:23

## 2022-07-12 RX ADMIN — LORAZEPAM 1 MG: 1 TABLET ORAL at 14:12

## 2022-07-12 RX ADMIN — LORAZEPAM 1 MG: 1 TABLET ORAL at 21:23

## 2022-07-12 RX ADMIN — TRAZODONE HYDROCHLORIDE 50 MG: 50 TABLET ORAL at 21:23

## 2022-07-12 RX ADMIN — LORAZEPAM 1 MG: 1 TABLET ORAL at 08:04

## 2022-07-12 RX ADMIN — LORAZEPAM 1.5 MG: 1 TABLET ORAL at 02:36

## 2022-07-12 RX ADMIN — Medication 1 TABLET: at 08:03

## 2022-07-12 RX ADMIN — Medication 100 MG: at 08:04

## 2022-07-12 NOTE — PROGRESS NOTES
"INPATIENT PSYCHIATRIC PROGRESS NOTE    Name:  Ailyn Samano  :  1965  MRN:  [id number]  Visit Number:  [id number]  Length of stay:  3    SUBJECTIVE  CC/Focus of Exam: alcohol use    INTERVAL HISTORY:  The patient had an episode yesterday when she became confused and disoriented. She reports she is feeling better now. She is alert and oriented x4. Reports no AVH. Also no active withdrawal symptoms are reported.     Review of Systems   Respiratory: Negative.    Cardiovascular: Negative.    Gastrointestinal: Negative.    Musculoskeletal: Positive for gait problem.       OBJECTIVE    Temp:  [96.7 °F (35.9 °C)-98.4 °F (36.9 °C)] 96.7 °F (35.9 °C)  Heart Rate:  [] 98  Resp:  [16-20] 18  BP: (116-161)/() 117/82    MENTAL STATUS EXAM:  Appearance:Casually dressed, good hygeine.   Cooperation:Cooperative  Psychomotor: No psychomotor agitation/retardation, No EPS, No motor tics  Speech-normal rate, amount.  Mood \"better\"   Affect-congruent, appropriate, stable  Thought Content-goal directed, no delusional material present  Thought process-linear, organized.  Suicidality: No SI  Homicidality: No HI  Perception: No AH/VH  Insight-fair   Judgement-fair    Lab Results (last 24 hours)     ** No results found for the last 24 hours. **             Imaging Results (Last 24 Hours)     ** No results found for the last 24 hours. **             ECG/EMG Results (most recent)     Procedure Component Value Units Date/Time    ECG 12 Lead [[id number]] Collected: 07/10/22 1020     Updated: 07/10/22 1418     QT Interval 388 ms      QTC Interval 447 ms     Narrative:      Test Reason : Baseline Cardiac Status  Blood Pressure :   */*   mmHG  Vent. Rate :  80 BPM     Atrial Rate :  80 BPM     P-R Int : 168 ms          QRS Dur :  82 ms      QT Int : 388 ms       P-R-T Axes :  61  56  58 degrees     QTc Int : 447 ms    Normal sinus rhythm  Possible Left atrial enlargement  Borderline ECG  When compared with ECG of 10-JUL-2022 " 10:20, (Unconfirmed)  No significant change was found  Confirmed by Olya Stubbs () on 7/10/2022 2:17:56 PM    Referred By: ONEIL           Confirmed By: Olya Stubbs           ALLERGIES: Patient has no known allergies.      Current Facility-Administered Medications:   •  acetaminophen (TYLENOL) tablet 650 mg, 650 mg, Oral, Q6H PRN, Jason Acuna MD, 650 mg at 22 1153  •  aluminum-magnesium hydroxide-simethicone (MAALOX MAX) 400-400-40 MG/5ML suspension 15 mL, 15 mL, Oral, Q6H PRN, Jason Acuna MD  •  benzonatate (TESSALON) capsule 100 mg, 100 mg, Oral, TID PRN, Jason Acuna MD  •  benztropine (COGENTIN) tablet 2 mg, 2 mg, Oral, Once PRN **OR** benztropine (COGENTIN) injection 1 mg, 1 mg, Intramuscular, Once PRN, Jason Acuna MD  •  famotidine (PEPCID) tablet 20 mg, 20 mg, Oral, BID PRN, Jason Acuna MD  •  hydrOXYzine (ATARAX) tablet 50 mg, 50 mg, Oral, Q6H PRN, Jason Acuna MD, 50 mg at 22  •  ibuprofen (ADVIL,MOTRIN) tablet 400 mg, 400 mg, Oral, Q6H PRN, Jason Acuna MD, 400 mg at 22  •  loperamide (IMODIUM) capsule 2 mg, 2 mg, Oral, Q2H PRN, Jason Acuna MD  •  [COMPLETED] LORazepam (ATIVAN) tablet 2 mg, 2 mg, Oral, 3 times per day, 2 mg at 07/10/22 2120 **FOLLOWED BY** [COMPLETED] LORazepam (ATIVAN) tablet 1.5 mg, 1.5 mg, Oral, 3 times per day, 1.5 mg at 22 **FOLLOWED BY** LORazepam (ATIVAN) tablet 1 mg, 1 mg, Oral, 3 times per day, 1 mg at 22 0804 **FOLLOWED BY** [START ON 2022] LORazepam (ATIVAN) tablet 0.5 mg, 0.5 mg, Oral, 3 times per day, Jason Acuna MD  •  [] LORazepam (ATIVAN) tablet 2 mg, 2 mg, Oral, Q4H PRN **FOLLOWED BY** [] LORazepam (ATIVAN) tablet 1.5 mg, 1.5 mg, Oral, Q4H PRN, 1.5 mg at 22 0236 **FOLLOWED BY** LORazepam (ATIVAN) tablet 1 mg, 1 mg, Oral, Q4H PRN **FOLLOWED BY** [START ON 2022] LORazepam (ATIVAN) tablet 0.5 mg, 0.5 mg, Oral, Q4H PRN, Jason Acuna MD  •   magnesium hydroxide (MILK OF MAGNESIA) suspension 10 mL, 10 mL, Oral, Daily PRN, Jason Acuna MD  •  multivitamin with minerals 1 tablet, 1 tablet, Oral, Daily, Jason Acuna MD, 1 tablet at 07/12/22 0803  •  ondansetron (ZOFRAN) tablet 4 mg, 4 mg, Oral, Q6H PRN, Jason Acuna MD, 4 mg at 07/09/22 1813  •  sodium chloride nasal spray 2 spray, 2 spray, Each Nare, PRN, Jason Acuna MD  •  thiamine (VITAMIN B-1) tablet 100 mg, 100 mg, Oral, Daily, Jason Acuna MD, 100 mg at 07/12/22 0804  •  traZODone (DESYREL) tablet 50 mg, 50 mg, Oral, Nightly PRN, Jason Acuna MD, 50 mg at 07/11/22 2117    ASSESSMENT & PLAN:    Alcohol use disorder severe  - Continue Ativan detox  - thiamine and folate    Weakness, generalized  - Encourage po intake  - Patient is using a walker to help her ambulate    Nicotine use disorder  - Encourage cessation    Special precautions: Special Precautions Level 4 (q30 min checks).    Behavioral Health Treatment Plan and Problem List: I have reviewed and approved the Behavioral Health Treatment Plan and Problem list.  The patient has had a chance to review and agrees with the treatment plan.     Clinician:  Andrew Stewart MD  07/12/22  10:11 EDT

## 2022-07-12 NOTE — PLAN OF CARE
Problem: Adult Behavioral Health Plan of Care  Goal: Plan of Care Review  Outcome: Ongoing, Not Progressing  Goal: Patient-Specific Goal (Individualization)  Outcome: Ongoing, Not Progressing  Goal: Adheres to Safety Considerations for Self and Others  Outcome: Ongoing, Not Progressing  Intervention: Develop and Maintain Individualized Safety Plan  Description: Identify risk factors for violence to self and others at time of admission, times of risk elevation, and on discharge.  Obtain clinical history including suicidal, homicidal, combative, assaultive, or aggressive behavior.  Discuss safety concerns with patient; develop a corresponding safety plan.  Provide immediate and ongoing protective physical environment.  Conduct environment of care safety checks; monitor visitors and their possessions.  Be alert to warning signs of suicidal, homicidal, assaultive, or aggressive behavior  Note: Denial of suicidal ideation or agreement to a safety plan does not invalidate suicide risk.  Encourage frequent positive patient-staff interactions to promote verbalization of safety compromising ideations, thoughts or behaviors.  Goal: Absence of New-Onset Illness or Injury  Outcome: Ongoing, Not Progressing  Intervention: Identify and Manage Fall Risk  Description: Perform standard risk assessment on admission and reassess fall risk frequently, with change in status or transfer to another level of care.  Communicate fall injury risk to interprofessional healthcare team.  Determine need for increased observation, equipment and environmental modification.  Adjust safety measures to individual developmental age and stage and identified risk factors.  Reinforce the importance of safety and activity limitations to patient and family.  Perform regular intentional rounding to assess safety needs.  Goal: Optimized Coping Skills in Response to Life Stressors  Outcome: Ongoing, Not Progressing  Goal: Develops/Participates in Therapeutic  [place] to Support Successful Transition  Outcome: Ongoing, Not Progressing  Intervention: Foster Therapeutic [place]  Description: Establish rapport; develop trust relationship.  Provide a safe space for interaction; convey acceptance and respect.  Normalize and validate patient experience; provide emotional support.  Identify and develop realistic, achievable recovery goals via shared decision-making.  Provide person and family-centered care; incorporate family/significant others in assessment and treatment planning.  Honor confidentiality.     Problem: Alcohol Withdrawal  Goal: Alcohol Withdrawal Symptom Control  Outcome: Ongoing, Not Progressing     Problem: Acute Neurologic Deterioration (Alcohol Withdrawal)  Goal: Optimal Neurologic Function  Outcome: Ongoing, Not Progressing     Problem: Substance Misuse (Alcohol Withdrawal)  Goal: Readiness for Change Identified  Outcome: Ongoing, Not Progressing  Intervention: Promote Psychosocial Wellbeing  Description: Use a nonjudgmental approach to establish a therapeutic alliance and trust; normalize and validate the patient and family/caregiver’s experience.  Include family/caregiver in assessment and planning, if supportive; honor confidentiality.  Encourage verbalization of feelings and concerns; partner to identify and utilize patient and family/caregiver strengths and coping strategies.  Emphasize importance of a strong support system; encourage support of family and friends.  Screen and monitor for signs and symptoms of co-occurring mental illness.     Problem: Skin Injury Risk Increased  Goal: Skin Health and Integrity  Outcome: Ongoing, Not Progressing     Problem: Fall Injury Risk  Goal: Absence of Fall and Fall-Related Injury  Outcome: Ongoing, Not Progressing  Intervention: Identify and Manage Contributors  Description: Develop a fall prevention plan with the patient and caregiver/family.  Provide reorientation, appropriate sensory stimulation and routines  with changes in mental status to decrease risk of fall.  Promote use of personal vision and auditory aids.  Assess assistance level required for safe and effective self-care; provide support as needed, such as toileting, mobilization. For age 65 and older, implement timed toileting with assistance.  Encourage physical activity, such as performance of mobility and self-care at highest level of patient ability, multicomponent exercise program and provision of appropriate assistive devices.  If fall occurs, assess the severity of injury; implement fall injury protocol. Determine the cause and revise fall injury prevention plan.  Regularly review medication contribution to fall risk; adjust medication administration times to minimize risk of falling.  Consider risk related to polypharmacy and age.  Balance adequate pain management with potential for oversedation.  Intervention: Promote Injury-Free Environment  Description: Provide a safe, barrier-free environment that encourages independent activity.  Keep care area uncluttered and well-lighted.  Determine need for increased observation or monitoring.  Avoid use of devices that minimize mobility, such as restraints or indwelling urinary catheter.   Goal Outcome Evaluation:  Plan of Care Reviewed With: patient  Patient Agreement with Plan of Care: agrees     Progress: declining  Outcome Evaluation: Increased confusion throughout the night. Elevated BP and tachycardia at times. Pt disoriented to time, place and situation. Required PAS dose of ativan. Found in bed with room mate during round, room mate asleep. Requiring frequent redirection.
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
## 2022-07-13 PROCEDURE — 99232 SBSQ HOSP IP/OBS MODERATE 35: CPT | Performed by: PSYCHIATRY & NEUROLOGY

## 2022-07-13 RX ADMIN — TRAZODONE HYDROCHLORIDE 50 MG: 50 TABLET ORAL at 21:28

## 2022-07-13 RX ADMIN — Medication 1 TABLET: at 08:33

## 2022-07-13 RX ADMIN — IBUPROFEN 400 MG: 400 TABLET, FILM COATED ORAL at 21:28

## 2022-07-13 RX ADMIN — LORAZEPAM 0.5 MG: 0.5 TABLET ORAL at 08:33

## 2022-07-13 RX ADMIN — IBUPROFEN 400 MG: 400 TABLET, FILM COATED ORAL at 14:10

## 2022-07-13 RX ADMIN — LORAZEPAM 0.5 MG: 0.5 TABLET ORAL at 14:10

## 2022-07-13 RX ADMIN — Medication 100 MG: at 08:32

## 2022-07-13 RX ADMIN — LORAZEPAM 0.5 MG: 0.5 TABLET ORAL at 21:28

## 2022-07-13 NOTE — PROGRESS NOTES
"INPATIENT PSYCHIATRIC PROGRESS NOTE    Name:  Ailyn Samano  :  1965  MRN:  [id number]  Visit Number:  [id number]  Length of stay:  4    SUBJECTIVE  CC/Focus of Exam: alcohol use    INTERVAL HISTORY:  The patient states she is feeling better and reports no active withdrawals. Denies any thoughts of harm to self or others. Also reports no hallucinations.      Review of Systems   Respiratory: Negative.    Cardiovascular: Negative.    Gastrointestinal: Negative.    Musculoskeletal: Positive for gait problem.       OBJECTIVE    Temp:  [97.6 °F (36.4 °C)-98.6 °F (37 °C)] 98.6 °F (37 °C)  Heart Rate:  [] 107  Resp:  [16-20] 18  BP: (110-133)/(71-91) 110/73    MENTAL STATUS EXAM:  Appearance:Casually dressed, good hygeine.   Cooperation:Cooperative  Psychomotor: No psychomotor agitation/retardation, No EPS, No motor tics  Speech-normal rate, amount.  Mood \"better\"   Affect-congruent, appropriate, stable  Thought Content-goal directed, no delusional material present  Thought process-linear, organized.  Suicidality: No SI  Homicidality: No HI  Perception: No AH/VH  Insight-fair   Judgement-fair    Lab Results (last 24 hours)     ** No results found for the last 24 hours. **             Imaging Results (Last 24 Hours)     ** No results found for the last 24 hours. **             ECG/EMG Results (most recent)     Procedure Component Value Units Date/Time    ECG 12 Lead [[id number]] Collected: 07/10/22 1020     Updated: 07/10/22 1418     QT Interval 388 ms      QTC Interval 447 ms     Narrative:      Test Reason : Baseline Cardiac Status  Blood Pressure :   */*   mmHG  Vent. Rate :  80 BPM     Atrial Rate :  80 BPM     P-R Int : 168 ms          QRS Dur :  82 ms      QT Int : 388 ms       P-R-T Axes :  61  56  58 degrees     QTc Int : 447 ms    Normal sinus rhythm  Possible Left atrial enlargement  Borderline ECG  When compared with ECG of 10-JUL-2022 10:20, (Unconfirmed)  No significant change was found  Confirmed " by Olya Stubbs () on 7/10/2022 2:17:56 PM    Referred By: ONEIL           Confirmed By: Olya Stubbs           ALLERGIES: Patient has no known allergies.      Current Facility-Administered Medications:   •  acetaminophen (TYLENOL) tablet 650 mg, 650 mg, Oral, Q6H PRN, Jason Acuna MD, 650 mg at 22 1153  •  aluminum-magnesium hydroxide-simethicone (MAALOX MAX) 400-400-40 MG/5ML suspension 15 mL, 15 mL, Oral, Q6H PRN, Jason Acuna MD  •  benzonatate (TESSALON) capsule 100 mg, 100 mg, Oral, TID PRN, Jason Acuna MD  •  benztropine (COGENTIN) tablet 2 mg, 2 mg, Oral, Once PRN **OR** benztropine (COGENTIN) injection 1 mg, 1 mg, Intramuscular, Once PRN, Jason Acuna MD  •  famotidine (PEPCID) tablet 20 mg, 20 mg, Oral, BID PRN, Jason Acuna MD  •  hydrOXYzine (ATARAX) tablet 50 mg, 50 mg, Oral, Q6H PRN, Jason Acuna MD, 50 mg at 22  •  ibuprofen (ADVIL,MOTRIN) tablet 400 mg, 400 mg, Oral, Q6H PRN, Jason Acuna MD, 400 mg at 22 1410  •  loperamide (IMODIUM) capsule 2 mg, 2 mg, Oral, Q2H PRN, Jason Acuna MD  •  [COMPLETED] LORazepam (ATIVAN) tablet 2 mg, 2 mg, Oral, 3 times per day, 2 mg at 07/10/22 2120 **FOLLOWED BY** [COMPLETED] LORazepam (ATIVAN) tablet 1.5 mg, 1.5 mg, Oral, 3 times per day, 1.5 mg at 22 **FOLLOWED BY** [COMPLETED] LORazepam (ATIVAN) tablet 1 mg, 1 mg, Oral, 3 times per day, 1 mg at 22 **FOLLOWED BY** LORazepam (ATIVAN) tablet 0.5 mg, 0.5 mg, Oral, 3 times per day, Jason Acuna MD, 0.5 mg at 22 1410  •  [] LORazepam (ATIVAN) tablet 2 mg, 2 mg, Oral, Q4H PRN **FOLLOWED BY** [] LORazepam (ATIVAN) tablet 1.5 mg, 1.5 mg, Oral, Q4H PRN, 1.5 mg at 22 0236 **FOLLOWED BY** [] LORazepam (ATIVAN) tablet 1 mg, 1 mg, Oral, Q4H PRN **FOLLOWED BY** LORazepam (ATIVAN) tablet 0.5 mg, 0.5 mg, Oral, Q4H PRN, Jason Acuna MD  •  magnesium hydroxide (MILK OF MAGNESIA) suspension 10 mL, 10  mL, Oral, Daily PRN, Jason Acuna MD  •  multivitamin with minerals 1 tablet, 1 tablet, Oral, Daily, Jason Acuna MD, 1 tablet at 07/13/22 0833  •  ondansetron (ZOFRAN) tablet 4 mg, 4 mg, Oral, Q6H PRN, Jason Acuna MD, 4 mg at 07/09/22 1813  •  sodium chloride nasal spray 2 spray, 2 spray, Each Nare, PRN, Jason Acuna MD  •  thiamine (VITAMIN B-1) tablet 100 mg, 100 mg, Oral, Daily, Jason Acuna MD, 100 mg at 07/13/22 0832  •  traZODone (DESYREL) tablet 50 mg, 50 mg, Oral, Nightly PRN, Jason Acuna MD, 50 mg at 07/12/22 2123    ASSESSMENT & PLAN:    Alcohol use disorder severe  - Continiue Ativan detox  - thiamine and folate    Weakness, generalized  - Encourage po intake  - Patient is using a walker to help her ambulate    Nicotine use disorder  - Encourage cessation    Plan discharge tomorrow.    Special precautions: Special Precautions Level 4 (q30 min checks).    Behavioral Health Treatment Plan and Problem List: I have reviewed and approved the Behavioral Health Treatment Plan and Problem list.  The patient has had a chance to review and agrees with the treatment plan.     Clinician:  Andrew Stewart MD  07/13/22  14:49 EDT
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
## 2022-07-13 NOTE — PLAN OF CARE
Problem: Adult Behavioral Health Plan of Care  Goal: Plan of Care Review  Outcome: Ongoing, Progressing  Flowsheets (Taken 7/13/2022 1800)  Progress: improving  Plan of Care Reviewed With: patient  Patient Agreement with Plan of Care: agrees   Goal Outcome Evaluation:  Plan of Care Reviewed With: patient  Patient Agreement with Plan of Care: agrees     Progress: no change  Outcome Evaluation: pt calm and cooperative.

## 2022-07-13 NOTE — PLAN OF CARE
Problem: Adult Behavioral Health Plan of Care  Goal: Develops/Participates in Therapeutic [place] to Support Successful Transition  Outcome: Ongoing, Progressing  Intervention: Foster Therapeutic [place]  Flowsheets (Taken 7/13/2022 0924)  Trust Relationship/Rapport:   care explained   choices provided   emotional support provided   questions encouraged   questions answered   empathic listening provided   reassurance provided   thoughts/feelings acknowledged  Intervention: Mutually Develop Transition Plan  Flowsheets (Taken 7/11/2022 1423 by Kiara Frias [facility])  Transition Support:   community resources reviewed   crisis management plan promoted   crisis management plan verbalized   follow-up care coordinated   follow-up care discussed      0910:     DATA:      Therapist met individually with patient this date to introduce role and to discuss hospitalization expectations. Patient agreeable. Reviewed medical record and staffed case with treatment team this date. No major issues identified.       Clinical Maneuvering/Intervention:     Therapist assisted patient in processing above session content; acknowledged and normalized patient’s thoughts, feelings, and concerns.  Discussed the therapist/patient relationship and explain the parameters and limitations of relative confidentiality.  Also discussed the importance of active participation, and honesty to the treatment process.  Encouraged the patient to discuss/vent their feelings, frustrations, and fears concerning their ongoing medical issues and validated their feelings.     Allowed patient to freely discuss issues without interruption or judgment. Provided safe, confidential environment to facilitate the development of positive therapeutic relationship and encourage open, honest communication.      Therapist addressed discharge safety planning this date. Assisted patient in identifying risk factors which would indicate the need for higher level of  care after discharge;  including thoughts to harm self or others and/or self-harming behavior. Encouraged patient to call 911, or present to the nearest emergency room should any of these events occur. Discussed crisis intervention services and means to access.       ASSESSMENT:      The patient was seen 1-1 in the office.  There is continued staff reports of patient being disoriented somewhat during night shift.  Today, she is able to answer all orientation questions correctly.  Patient denies SI/HI/AVH.  Denies acute withdrawal symptoms and focused on discharge tomorrow.      Patient signed consent for her sister Tayla at [phone]; Tayla remains supportive. Therapist reviewed that patient refuses rehab. Educated Tayla about [place] as a resource if patient continues to drink.  Patient this time is her own guardian and declining. She did agree to [facility].  Tayla verbalized understanding and states that she is familiar as she had to get a 202A once on the patient for [place].  Tayla reports that it is not unusual for patient to be confused or have psychiatric symptoms such as hearing voices.  She reports that patient is okay to return to her own home and that as far as she knows that the home is safe guarded. She reports that someone in the family can  the patient.        PLAN:       Patient to remain hospitalized this date.      Treatment team will focus efforts on stabilizing patient's acute symptoms while providing education on healthy coping and crisis management to reduce hospitalizations.   Patient requires daily psychiatrist evaluation and 24/7 nursing supervision to promote patient  safety.     Therapist will offer 1-4 individual sessions, family education, and appropriate referral.     Therapist recommends residential referral.  Patient declines and signed consent for [facility].  Patient to return to her own home at discharge. She has been encouraged to safe guard from alcohol and to also  safe guard from weapons/firearms. Patient agreeable.   Patient's sister Tayla confirms that she can return home at discharge. Patient would also likely benefit from case management
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
## 2022-07-14 VITALS
SYSTOLIC BLOOD PRESSURE: 127 MMHG | DIASTOLIC BLOOD PRESSURE: 78 MMHG | BODY MASS INDEX: 20.52 KG/M2 | OXYGEN SATURATION: 98 % | RESPIRATION RATE: 18 BRPM | HEIGHT: 70 IN | TEMPERATURE: 97.7 F | HEART RATE: 87 BPM

## 2022-07-14 PROCEDURE — 99238 HOSP IP/OBS DSCHRG MGMT 30/<: CPT | Performed by: PSYCHIATRY & NEUROLOGY

## 2022-07-14 RX ORDER — TRAZODONE HYDROCHLORIDE 50 MG/1
50 TABLET ORAL NIGHTLY PRN
Qty: 30 TABLET | Refills: 0 | Status: SHIPPED | OUTPATIENT
Start: 2022-07-14 | End: 2022-09-27

## 2022-07-14 RX ADMIN — IBUPROFEN 400 MG: 400 TABLET, FILM COATED ORAL at 08:11

## 2022-07-14 RX ADMIN — Medication 100 MG: at 08:10

## 2022-07-14 RX ADMIN — Medication 1 TABLET: at 08:10

## 2022-07-14 NOTE — PLAN OF CARE
Goal Outcome Evaluation:  Plan of Care Reviewed With: patient  Patient Agreement with Plan of Care: agrees     Progress: improving  Outcome Evaluation: Discharge order noted, pt deneying s/s and reports improvement with withdrawal.

## 2022-07-14 NOTE — CASE MANAGEMENT/SOCIAL WORK
..Bridge Session  Date: 7/14/2022   Time: 1015    Data:  Reason for Inpatient Admission: Alcohol dependence and withdrawal    Follow up:[place] [address]  [phone]     Appointment:  7/19/2022 at 9:00am.       Coping Skills to Utilize: Patient encouraged to utilize negative thought redirection, mindfulness, accessing support system and physical activity.    Crisis Safety Plan:  • Support System to utilize and contact numbers: Patients sister and patient has contact number    • Educated on crisis hotline numbers (yes/no): Yes    • Was the Patient made aware of contact information for the following: community mental health centers, crisis stabilization programs, residential programs, , etc (yes/no): Yes    Will transportation be a barrier (yes/no): No  • If so, explain solution(s) to resolve barrier: n/a    • How and where will the patient obtain prescribed medications: Patients medications were filled today by [facility] and brought to patient.  The cost of the medication was covered by insurance.    Assessment: Patient is denying suicidal ideation today.  Patient reports decreased depression and anxiety today.  Patient reports feeling better and ready to return home.  Patient verbalizes understanding of the importance of safety and aftercare.    Plan:    Discussed the importance of follow up treatment for continuity of care. The Patient was able to verbalize understanding and commitment to the individualized aftercare and crisis safety plan.      Kiara Frias [facility]

## 2022-07-14 NOTE — DISCHARGE SUMMARY
":  1965  MRN:  [id number]  Visit Number:  [id number]      Date of Admission:2022   Date of Discharge:  2022    Discharge Diagnosis:  Principal Problem:    Alcohol use disorder, severe, dependence (HCC)  Active Problems:    Nicotine use disorder        Admission Diagnosis:  Chemical dependency (HCC) [F19.20]     SADIE Samano is a 56 y.o. female who was admitted on 2022 and evaluated on 07-  with complaints of alcohol use and withdrawals.  For details please see H&P dated 7/10/22.    Hospital Course  Patient is a 56 y.o. female presented with alcohol use and withdrawals. The patient was admitted to the [facility] detox recovery unit for safety, further evaluation and treatment.  The patient was started on Ativan detox and she was able to complete it without any major complications. She had an episode one night when she was confused but it didn't happen again.   The patient was also able to take part in individual and group counseling sessions and work on appropriate coping skills.  The patient made steady improvement in her withdrawals and mood and expressed feeling more positive and hopeful about future. Sleep and appetite were improved.  The day of discharge the patient was calm, cooperative and pleasant. Mood was reported to be good, and denied SI/HI/AVH. Also reported no medication side effects.        Mental Status Exam upon discharge:   Mood \"good\"   Affect-congruent, appropriate, stable  Thought Content-goal directed, no delusional material present  Thought process-linear, organized.  Suicidality: No SI  Homicidality: No HI  Perception: No AH/VH    Procedures Performed         Consults:   Consults     No orders found from 6/10/2022 to 7/10/2022.          Pertinent Test Results:   Admission on 2022   Component Date Value Ref Range Status   • QT Interval 07/10/2022 388  ms Final   • QTC Interval 07/10/2022 447  ms Final   Admission on 2022, Discharged on " 07/09/2022   Component Date Value Ref Range Status   • Glucose 07/09/2022 117 (A) 65 - 99 mg/dL Final   • BUN 07/09/2022 12  6 - 20 mg/dL Final   • Creatinine 07/09/2022 0.77  0.57 - 1.00 mg/dL Final   • Sodium 07/09/2022 133 (A) 136 - 145 mmol/L Final   • Potassium 07/09/2022 4.3  3.5 - 5.2 mmol/L Final   • Chloride 07/09/2022 97 (A) 98 - 107 mmol/L Final   • CO2 07/09/2022 23.7  22.0 - 29.0 mmol/L Final   • Calcium 07/09/2022 9.2  8.6 - 10.5 mg/dL Final   • Total Protein 07/09/2022 7.6  6.0 - 8.5 g/dL Final   • Albumin 07/09/2022 4.29  3.50 - 5.20 g/dL Final   • ALT (SGPT) 07/09/2022 33  1 - 33 U/L Final   • AST (SGOT) 07/09/2022 62 (A) 1 - 32 U/L Final   • Alkaline Phosphatase 07/09/2022 128 (A) 39 - 117 U/L Final   • Total Bilirubin 07/09/2022 0.2  0.0 - 1.2 mg/dL Final   • Globulin 07/09/2022 3.3  gm/dL Final   • A/G Ratio 07/09/2022 1.3  g/dL Final   • BUN/Creatinine Ratio 07/09/2022 15.6  7.0 - 25.0 Final   • Anion Gap 07/09/2022 12.3  5.0 - 15.0 mmol/L Final   • eGFR 07/09/2022 91.8  >60.0 mL/min/1.73 Final    National Kidney Foundation and American Society of Nephrology (ASN) Task Force recommended calculation based on the Chronic Kidney Disease Epidemiology Collaboration (CKD-EPI) equation refit without adjustment for race.   • Color, UA 07/09/2022 Yellow  Yellow, Straw Final   • Appearance, UA 07/09/2022 Clear  Clear Final   • pH, UA 07/09/2022 <=5.0  5.0 - 8.0 Final   • Specific Gravity, UA 07/09/2022 1.007  1.005 - 1.030 Final   • Glucose, UA 07/09/2022 Negative  Negative Final   • Ketones, UA 07/09/2022 Negative  Negative Final   • Bilirubin, UA 07/09/2022 Negative  Negative Final   • Blood, UA 07/09/2022 Negative  Negative Final   • Protein, UA 07/09/2022 Negative  Negative Final   • Leuk Esterase, UA 07/09/2022 Negative  Negative Final   • Nitrite, UA 07/09/2022 Positive (A) Negative Final   • Urobilinogen, UA 07/09/2022 0.2 E.U./dL  0.2 - 1.0 E.U./dL Final   • THC, Screen, Urine 07/09/2022 Negative   Negative Final   • Phencyclidine (PCP), Urine 07/09/2022 Negative  Negative Final   • Cocaine Screen, Urine 07/09/2022 Negative  Negative Final   • Methamphetamine, Ur 07/09/2022 Negative  Negative Final   • Opiate Screen 07/09/2022 Negative  Negative Final   • Amphetamine Screen, Urine 07/09/2022 Negative  Negative Final   • Benzodiazepine Screen, Urine 07/09/2022 Negative  Negative Final   • Tricyclic Antidepressants Screen 07/09/2022 Negative  Negative Final   • Methadone Screen, Urine 07/09/2022 Negative  Negative Final   • Barbiturates Screen, Urine 07/09/2022 Negative  Negative Final   • Oxycodone Screen, Urine 07/09/2022 Negative  Negative Final   • Propoxyphene Screen 07/09/2022 Negative  Negative Final   • Buprenorphine, Screen, Urine 07/09/2022 Negative  Negative Final   • Ethanol 07/09/2022 345 (A) 0 - 10 mg/dL Final   • Ethanol % 07/09/2022 0.345  % Final   • Magnesium 07/09/2022 1.8  1.6 - 2.6 mg/dL Final   • HCG, Urine QL 07/09/2022 Negative  Negative Final   • WBC 07/09/2022 5.62  3.40 - 10.80 10*3/mm3 Final   • RBC 07/09/2022 3.97  3.77 - 5.28 10*6/mm3 Final   • Hemoglobin 07/09/2022 14.5  12.0 - 15.9 g/dL Final   • Hematocrit 07/09/2022 42.2  34.0 - 46.6 % Final   • MCV 07/09/2022 106.3 (A) 79.0 - 97.0 fL Final   • MCH 07/09/2022 36.5 (A) 26.6 - 33.0 pg Final   • MCHC 07/09/2022 34.4  31.5 - 35.7 g/dL Final   • RDW 07/09/2022 11.9 (A) 12.3 - 15.4 % Final   • RDW-SD 07/09/2022 47.7  37.0 - 54.0 fl Final   • MPV 07/09/2022 9.2  6.0 - 12.0 fL Final   • Platelets 07/09/2022 193  140 - 450 10*3/mm3 Final   • Neutrophil % 07/09/2022 52.6  42.7 - 76.0 % Final   • Lymphocyte % 07/09/2022 34.9  19.6 - 45.3 % Final   • Monocyte % 07/09/2022 9.8  5.0 - 12.0 % Final   • Eosinophil % 07/09/2022 1.4  0.3 - 6.2 % Final   • Basophil % 07/09/2022 1.1  0.0 - 1.5 % Final   • Immature Grans % 07/09/2022 0.2  0.0 - 0.5 % Final   • Neutrophils, Absolute 07/09/2022 2.96  1.70 - 7.00 10*3/mm3 Final   • Lymphocytes,  Absolute 07/09/2022 1.96  0.70 - 3.10 10*3/mm3 Final   • Monocytes, Absolute 07/09/2022 0.55  0.10 - 0.90 10*3/mm3 Final   • Eosinophils, Absolute 07/09/2022 0.08  0.00 - 0.40 10*3/mm3 Final   • Basophils, Absolute 07/09/2022 0.06  0.00 - 0.20 10*3/mm3 Final   • Immature Grans, Absolute 07/09/2022 0.01  0.00 - 0.05 10*3/mm3 Final   • nRBC 07/09/2022 0.0  0.0 - 0.2 /100 WBC Final   • COVID19 07/09/2022 Not Detected  Not Detected - Ref. Range Final   • Influenza A PCR 07/09/2022 Not Detected  Not Detected Final   • Influenza B PCR 07/09/2022 Not Detected  Not Detected Final   • RBC, UA 07/09/2022 None Seen  None Seen, 0-2 /HPF Final   • WBC, UA 07/09/2022 0-2  None Seen, 0-2 /HPF Final   • Bacteria, UA 07/09/2022 2+ (A) None Seen /HPF Final   • Squamous Epithelial Cells, UA 07/09/2022 0-2  None Seen, 0-2 /HPF Final   • Hyaline Casts, UA 07/09/2022 None Seen  None Seen /LPF Final   • Methodology 07/09/2022 Automated Microscopy   Final   • Ethanol 07/09/2022 167 (A) 0 - 10 mg/dL Final   • Ethanol % 07/09/2022 0.167  % Final   • Ethanol 07/09/2022 58 (A) 0 - 10 mg/dL Final   • Ethanol % 07/09/2022 0.058  % Final        Condition on Discharge:  improved    Vital Signs  Temp:  [97.6 °F (36.4 °C)-98.6 °F (37 °C)] 97.7 °F (36.5 °C)  Heart Rate:  [] 87  Resp:  [18] 18  BP: (110-128)/(73-85) 127/78      Discharge Disposition:  Home or Self Care    Discharge Medications:     Discharge Medications      New Medications      Instructions Start Date   traZODone 50 MG tablet  Commonly known as: DESYREL   50 mg, Oral, Nightly PRN         Continue These Medications      Instructions Start Date   multivitamin with minerals tablet tablet   1 tablet, Oral, Daily         Stop These Medications    acetaminophen 500 MG tablet  Commonly known as: TYLENOL            Discharge Diet: Regular     Activity at Discharge: As tolerated     Follow-up Appointments     [address] [address] [phone]      Appointment:  7/19/2022 at 9:00am.        Time spent in discharge: < 30 min    Clinician:   Andrew Stewart MD  07/14/22  10:27 EDT

## 2022-07-14 NOTE — PLAN OF CARE
Goal Outcome Evaluation:  Plan of Care Reviewed With: patient  Patient Agreement with Plan of Care: agrees     Progress: improving     Pt slept well, appetite is good, and participated in group.

## 2022-07-14 NOTE — PROGRESS NOTES
1057:     Therapist assisting with discharge planning. Patient has been discharged. She denies SI/HI/AVH and acute withdrawal symptoms.  She does not demonstrate any confusion or delusions.  Patient requesting to return home and calling family for transport. She is scheduled with [facility].  She was encouraged to consider rehab, but declines.  Patient's sister was educated about [facility] as a future resource.      Concern was voiced regarding substance use and educated patient on risks associated with use. Patient was advised to abstain from use and educated on community resources that can help with sobriety and recovery.      Assisted patient in identifying risk factors which would indicate the need for higher level of care including thoughts to harm self or others and/or self-harming behavior and encouraged patient to \ call 911, or present to the nearest emergency room should any of these events occur. Discussed crisis intervention services and means to access.  Patient adamantly and convincingly denies current suicidal or homicidal ideation or perceptual disturbance.

## 2022-09-27 ENCOUNTER — OFFICE VISIT (OUTPATIENT)
Dept: FAMILY MEDICINE CLINIC | Facility: CLINIC | Age: 57
End: 2022-09-27

## 2022-09-27 VITALS
OXYGEN SATURATION: 98 % | WEIGHT: 152 LBS | HEIGHT: 70 IN | DIASTOLIC BLOOD PRESSURE: 100 MMHG | TEMPERATURE: 98.2 F | SYSTOLIC BLOOD PRESSURE: 160 MMHG | HEART RATE: 98 BPM | BODY MASS INDEX: 21.76 KG/M2

## 2022-09-27 DIAGNOSIS — Z12.31 ENCOUNTER FOR SCREENING MAMMOGRAM FOR MALIGNANT NEOPLASM OF BREAST: ICD-10-CM

## 2022-09-27 DIAGNOSIS — K26.4 DUODENAL ULCER WITH HEMORRHAGE: Primary | ICD-10-CM

## 2022-09-27 DIAGNOSIS — R26.81 UNSTEADY GAIT: ICD-10-CM

## 2022-09-27 DIAGNOSIS — G62.9 NEUROPATHY: ICD-10-CM

## 2022-09-27 DIAGNOSIS — D50.0 IRON DEFICIENCY ANEMIA DUE TO CHRONIC BLOOD LOSS: ICD-10-CM

## 2022-09-27 DIAGNOSIS — M51.36 DDD (DEGENERATIVE DISC DISEASE), LUMBAR: ICD-10-CM

## 2022-09-27 DIAGNOSIS — I10 ESSENTIAL HYPERTENSION: ICD-10-CM

## 2022-09-27 PROCEDURE — 80053 COMPREHEN METABOLIC PANEL: CPT | Performed by: PHYSICIAN ASSISTANT

## 2022-09-27 PROCEDURE — 83090 ASSAY OF HOMOCYSTEINE: CPT | Performed by: PHYSICIAN ASSISTANT

## 2022-09-27 PROCEDURE — 82607 VITAMIN B-12: CPT | Performed by: PHYSICIAN ASSISTANT

## 2022-09-27 PROCEDURE — 85025 COMPLETE CBC W/AUTO DIFF WBC: CPT | Performed by: PHYSICIAN ASSISTANT

## 2022-09-27 PROCEDURE — 82746 ASSAY OF FOLIC ACID SERUM: CPT | Performed by: PHYSICIAN ASSISTANT

## 2022-09-27 PROCEDURE — 99214 OFFICE O/P EST MOD 30 MIN: CPT | Performed by: PHYSICIAN ASSISTANT

## 2022-09-27 PROCEDURE — 83921 ORGANIC ACID SINGLE QUANT: CPT | Performed by: PHYSICIAN ASSISTANT

## 2022-09-27 RX ORDER — PANTOPRAZOLE SODIUM 40 MG/1
40 TABLET, DELAYED RELEASE ORAL 2 TIMES DAILY
COMMUNITY
End: 2022-09-27 | Stop reason: SDUPTHER

## 2022-09-27 RX ORDER — SUCRALFATE 1 G/1
1 TABLET ORAL 4 TIMES DAILY
COMMUNITY
End: 2022-10-27 | Stop reason: SDUPTHER

## 2022-09-27 RX ORDER — PANTOPRAZOLE SODIUM 40 MG/1
TABLET, DELAYED RELEASE ORAL
Qty: 60 TABLET | Refills: 0 | Status: SHIPPED | OUTPATIENT
Start: 2022-09-27 | End: 2022-10-27 | Stop reason: SDUPTHER
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
## 2022-09-28 LAB
ALBUMIN SERPL-MCNC: 3.3 G/DL (ref 3.5–5.2)
ALBUMIN/GLOB SERPL: 1.5 G/DL
ALP SERPL-CCNC: 73 U/L (ref 39–117)
ALT SERPL W P-5'-P-CCNC: 16 U/L (ref 1–33)
ANION GAP SERPL CALCULATED.3IONS-SCNC: 9.3 MMOL/L (ref 5–15)
AST SERPL-CCNC: 17 U/L (ref 1–32)
BASOPHILS # BLD AUTO: 0.05 10*3/MM3 (ref 0–0.2)
BASOPHILS NFR BLD AUTO: 0.9 % (ref 0–1.5)
BILIRUB SERPL-MCNC: <0.2 MG/DL (ref 0–1.2)
BUN SERPL-MCNC: 6 MG/DL (ref 6–20)
BUN/CREAT SERPL: 10.7 (ref 7–25)
CALCIUM SPEC-SCNC: 8.9 MG/DL (ref 8.6–10.5)
CHLORIDE SERPL-SCNC: 104 MMOL/L (ref 98–107)
CO2 SERPL-SCNC: 26.7 MMOL/L (ref 22–29)
CREAT SERPL-MCNC: 0.56 MG/DL (ref 0.57–1)
DEPRECATED RDW RBC AUTO: 54.1 FL (ref 37–54)
EGFRCR SERPLBLD CKD-EPI 2021: 108.6 ML/MIN/1.73
EOSINOPHIL # BLD AUTO: 0.15 10*3/MM3 (ref 0–0.4)
EOSINOPHIL NFR BLD AUTO: 2.7 % (ref 0.3–6.2)
ERYTHROCYTE [DISTWIDTH] IN BLOOD BY AUTOMATED COUNT: 15.3 % (ref 12.3–15.4)
FOLATE SERPL-MCNC: >20 NG/ML (ref 4.78–24.2)
GLOBULIN UR ELPH-MCNC: 2.2 GM/DL
GLUCOSE SERPL-MCNC: 100 MG/DL (ref 65–99)
HCT VFR BLD AUTO: 30 % (ref 34–46.6)
HCYS SERPL-MCNC: 6.8 UMOL/L (ref 0–15)
HGB BLD-MCNC: 9.7 G/DL (ref 12–15.9)
IMM GRANULOCYTES # BLD AUTO: 0.01 10*3/MM3 (ref 0–0.05)
IMM GRANULOCYTES NFR BLD AUTO: 0.2 % (ref 0–0.5)
LYMPHOCYTES # BLD AUTO: 1 10*3/MM3 (ref 0.7–3.1)
LYMPHOCYTES NFR BLD AUTO: 17.9 % (ref 19.6–45.3)
MCH RBC QN AUTO: 32.4 PG (ref 26.6–33)
MCHC RBC AUTO-ENTMCNC: 32.3 G/DL (ref 31.5–35.7)
MCV RBC AUTO: 100.3 FL (ref 79–97)
MONOCYTES # BLD AUTO: 0.76 10*3/MM3 (ref 0.1–0.9)
MONOCYTES NFR BLD AUTO: 13.6 % (ref 5–12)
NEUTROPHILS NFR BLD AUTO: 3.61 10*3/MM3 (ref 1.7–7)
NEUTROPHILS NFR BLD AUTO: 64.7 % (ref 42.7–76)
NRBC BLD AUTO-RTO: 0 /100 WBC (ref 0–0.2)
PLATELET # BLD AUTO: 282 10*3/MM3 (ref 140–450)
PMV BLD AUTO: 10.7 FL (ref 6–12)
POTASSIUM SERPL-SCNC: 4 MMOL/L (ref 3.5–5.2)
PROT SERPL-MCNC: 5.5 G/DL (ref 6–8.5)
RBC # BLD AUTO: 2.99 10*6/MM3 (ref 3.77–5.28)
SODIUM SERPL-SCNC: 140 MMOL/L (ref 136–145)
VIT B12 BLD-MCNC: 416 PG/ML (ref 211–946)
WBC NRBC COR # BLD: 5.58 10*3/MM3 (ref 3.4–10.8)

## 2022-10-03 LAB — METHYLMALONATE SERPL-SCNC: 96 NMOL/L (ref 0–378)

## 2022-10-11 ENCOUNTER — HOSPITAL ENCOUNTER (OUTPATIENT)
Dept: MAMMOGRAPHY | Facility: HOSPITAL | Age: 57
End: 2022-10-11

## 2022-10-20 ENCOUNTER — APPOINTMENT (OUTPATIENT)
Dept: MRI IMAGING | Facility: HOSPITAL | Age: 57
End: 2022-10-20

## 2022-10-27 ENCOUNTER — OFFICE VISIT (OUTPATIENT)
Dept: FAMILY MEDICINE CLINIC | Facility: CLINIC | Age: 57
End: 2022-10-27

## 2022-10-27 VITALS
SYSTOLIC BLOOD PRESSURE: 124 MMHG | OXYGEN SATURATION: 100 % | HEIGHT: 70 IN | BODY MASS INDEX: 21.62 KG/M2 | HEART RATE: 88 BPM | DIASTOLIC BLOOD PRESSURE: 88 MMHG | TEMPERATURE: 97.3 F | WEIGHT: 151 LBS

## 2022-10-27 DIAGNOSIS — F40.298 FEAR OF FALLING: Chronic | ICD-10-CM

## 2022-10-27 DIAGNOSIS — E78.2 MIXED HYPERLIPIDEMIA: Chronic | ICD-10-CM

## 2022-10-27 DIAGNOSIS — F41.1 GAD (GENERALIZED ANXIETY DISORDER): Chronic | ICD-10-CM

## 2022-10-27 DIAGNOSIS — K26.9 MULTIPLE DUODENAL ULCERS: Primary | Chronic | ICD-10-CM

## 2022-10-27 DIAGNOSIS — D50.0 BLOOD LOSS ANEMIA: Chronic | ICD-10-CM

## 2022-10-27 PROCEDURE — 99214 OFFICE O/P EST MOD 30 MIN: CPT | Performed by: PHYSICIAN ASSISTANT

## 2022-10-27 RX ORDER — PANTOPRAZOLE SODIUM 40 MG/1
40 TABLET, DELAYED RELEASE ORAL DAILY
Qty: 30 TABLET | Refills: 5 | Status: SHIPPED | OUTPATIENT
Start: 2022-10-27

## 2022-10-27 RX ORDER — SUCRALFATE 1 G/1
1 TABLET ORAL 4 TIMES DAILY
Qty: 120 TABLET | Refills: 1 | Status: SHIPPED | OUTPATIENT
Start: 2022-10-27 | End: 2023-01-31

## 2022-10-27 RX ORDER — BUSPIRONE HYDROCHLORIDE 10 MG/1
10 TABLET ORAL 2 TIMES DAILY PRN
Qty: 60 TABLET | Refills: 5 | Status: SHIPPED | OUTPATIENT
Start: 2022-10-27 | End: 2022-12-01

## 2022-10-30 NOTE — PROGRESS NOTES
"Heather Samano is a 57 y.o. female.       Chief Complaint -ulcers    History of Present Illness -    ROS    Ulcers-  She reports hemoptysis that began September 21.  She went to [facility] where she underwent endoscopy showing multiple duodenal ulcers.  Patient was diagnosed with acute gastritis, duodenitis, acute duodenal ulcer and treated.  She has been taking sucralfate but states she was unable to get the pantoprazole as it was written for twice daily and insurance will only cover once daily dosing.    Fear of falling-  Patient is in wheelchair today and states she has been undergoing physical therapy at home.  She reports a fear that she will fall when walking from a standing position.  She states that she avoids walking due to this fear which has caused some atrophy in her leg muscles.  Some improvement with physical therapy    Anxiety-  Patient complains of anxiety and insomnia with trouble going to sleep because she reports mind racing.  She denies any hallucinations SI or HI.    Hyperlipidemia- not at goal and lab work was discussed with patient today    Anemia-  This is due to be rechecked.  She denies any current hematochezia hemoptysis or black stool.    The following portions of the patient's history were reviewed and updated as appropriate: allergies, current medications, past family history, past medical history, past social history, past surgical history and problem list.    Review of Systems    Objective  Vital signs:  /88   Pulse 88   Temp 97.3 °F (36.3 °C) (Temporal)   Ht 177.8 cm (70\")   Wt 68.5 kg (151 lb)   SpO2 100%   BMI 21.67 kg/m²     Physical Exam  Vitals and nursing note reviewed.   Constitutional:       Appearance: Normal appearance. She is well-developed.   Eyes:      Extraocular Movements: Extraocular movements intact.      Conjunctiva/sclera: Conjunctivae normal.   Cardiovascular:      Rate and Rhythm: Normal rate and regular rhythm.      Heart sounds: " Normal heart sounds. No murmur heard.  Pulmonary:      Effort: Pulmonary effort is normal. No respiratory distress.      Breath sounds: Normal breath sounds. No wheezing.   Musculoskeletal:         General: No tenderness.      Comments: Patient is in wheelchair today   Skin:     General: Skin is warm and dry.      Findings: No rash.   Neurological:      Mental Status: She is alert and oriented to person, place, and time.   Psychiatric:         Mood and Affect: Mood normal.         Behavior: Behavior normal.         Thought Content: Thought content normal.         The following data was reviewed by: TWAN Almanza on 10/27/2022:  CMP    CMP 7/9/22 9/27/22   Glucose 117 (A) 100 (A)   BUN 12 6   Creatinine 0.77 0.56 (A)   Sodium 133 (A) 140   Potassium 4.3 4.0   Chloride 97 (A) 104   Calcium 9.2 8.9   Albumin 4.29 3.30 (A)   Total Bilirubin 0.2 <0.2   Alkaline Phosphatase 128 (A) 73   AST (SGOT) 62 (A) 17   ALT (SGPT) 33 16   (A) Abnormal value            CBC w/diff    CBC w/Diff 7/9/22 9/27/22   WBC 5.62 5.58   RBC 3.97 2.99 (A)   Hemoglobin 14.5 9.7 (A)   Hematocrit 42.2 30.0 (A)   .3 (A) 100.3 (A)   MCH 36.5 (A) 32.4   MCHC 34.4 32.3   RDW 11.9 (A) 15.3   Platelets 193 282   Neutrophil Rel % 52.6 64.7   Immature Granulocyte Rel % 0.2 0.2   Lymphocyte Rel % 34.9 17.9 (A)   Monocyte Rel % 9.8 13.6 (A)   Eosinophil Rel % 1.4 2.7   Basophil Rel % 1.1 0.9   (A) Abnormal value                               Assessment & Plan     Diagnoses and all orders for this visit:    1. Multiple duodenal ulcers (Primary)  Comments:  Start pantoprazole once daily  Continue sucralfate  Advised to avoid NSAID use  Orders:  -     pantoprazole (PROTONIX) 40 MG EC tablet; Take 1 tablet by mouth Daily. Take 1 tablet by mouth 2 times daily for 30 days, then decrease to once daily.  Dispense: 30 tablet; Refill: 5  -     sucralfate (CARAFATE) 1 g tablet; Take 1 tablet by mouth 4 (Four) Times a Day.  Dispense: 120 tablet; Refill:  1    2. Fear of falling  Comments:  Refer to psychiatry for further evaluation  Orders:  -     Ambulatory Referral to Psychiatry    3. MICHAEL (generalized anxiety disorder)  Comments:  Start BuSpar as this should help with mind racing to help her get to sleep  Orders:  -     busPIRone (BUSPAR) 10 MG tablet; Take 1 tablet by mouth 2 (Two) Times a Day As Needed (anxiety).  Dispense: 60 tablet; Refill: 5  -     Comprehensive Metabolic Panel; Future    4. Mixed hyperlipidemia  Comments:  Advised low-cholesterol diet  Orders:  -     Lipid Panel; Future    5. Blood loss anemia  Comments:  Lab work ordered for further evaluation  No active bleeding currently reported  Orders:  -     CBC & Differential; Future  -     Comprehensive Metabolic Panel; Future            Patient was given instructions and counseling regarding his condition or for health maintenance advice. Please see specific information pulled into the AVS if appropriate      This document has been electronically signed by:  Mary Egan PA-C

## 2022-10-30 NOTE — PATIENT INSTRUCTIONS
Fall Prevention in the Home, Adult  Falls can cause injuries and can happen to people of all ages. There are many things you can do to make your home safe and to help prevent falls. Ask for help when making these changes.  What actions can I take to prevent falls?  General Instructions  Use good lighting in all rooms. Replace any light bulbs that burn out.  Turn on the lights in dark areas. Use night-lights.  Keep items that you use often in easy-to-reach places. Lower the shelves around your home if needed.  Set up your furniture so you have a clear path. Avoid moving your furniture around.  Do not have throw rugs or other things on the floor that can make you trip.  Avoid walking on wet floors.  If any of your floors are uneven, fix them.  Add color or contrast paint or tape to clearly cj and help you see:  Grab bars or handrails.  First and last steps of staircases.  Where the edge of each step is.  If you use a stepladder:  Make sure that it is fully opened. Do not climb a closed stepladder.  Make sure the sides of the stepladder are locked in place.  Ask someone to hold the stepladder while you use it.  Know where your pets are when moving through your home.  What can I do in the bathroom?     Keep the floor dry. Clean up any water on the floor right away.  Remove soap buildup in the tub or shower.  Use nonskid mats or decals on the floor of the tub or shower.  Attach bath mats securely with double-sided, nonslip rug tape.  If you need to sit down in the shower, use a plastic, nonslip stool.  Install grab bars by the toilet and in the tub and shower. Do not use towel bars as grab bars.  What can I do in the bedroom?  Make sure that you have a light by your bed that is easy to reach.  Do not use any sheets or blankets for your bed that hang to the floor.  Have a firm chair with side arms that you can use for support when you get dressed.  What can I do in the kitchen?  Clean up any spills right away.  If you  need to reach something above you, use a step stool with a grab bar.  Keep electrical cords out of the way.  Do not use floor polish or wax that makes floors slippery.  What can I do with my stairs?  Do not leave any items on the stairs.  Make sure that you have a light switch at the top and the bottom of the stairs.  Make sure that there are handrails on both sides of the stairs. Fix handrails that are broken or loose.  Install nonslip stair treads on all your stairs.  Avoid having throw rugs at the top or bottom of the stairs.  Choose a carpet that does not hide the edge of the steps on the stairs.  Check carpeting to make sure that it is firmly attached to the stairs. Fix carpet that is loose or worn.  What can I do on the outside of my home?  Use bright outdoor lighting.  Fix the edges of walkways and driveways and fix any cracks.  Remove anything that might make you trip as you walk through a door, such as a raised step or threshold.  Trim any bushes or trees on paths to your home.  Check to see if handrails are loose or broken and that both sides of all steps have handrails.  Install guardrails along the edges of any raised decks and porches.  Clear paths of anything that can make you trip, such as tools or rocks.  Have leaves, snow, or ice cleared regularly.  Use sand or salt on paths during winter.  Clean up any spills in your garage right away. This includes grease or oil spills.  What other actions can I take?  Wear shoes that:  Have a low heel. Do not wear high heels.  Have rubber bottoms.  Feel good on your feet and fit well.  Are closed at the toe. Do not wear open-toe sandals.  Use tools that help you move around if needed. These include:  Canes.  Walkers.  Scooters.  Crutches.  Review your medicines with your doctor. Some medicines can make you feel dizzy. This can increase your chance of falling.  Ask your doctor what else you can do to help prevent falls.  Where to find more information  Centers for  Disease Control and Prevention, [facility]: www.cdc.gov  [place] [employer] [place]: www.amita.nih.gov  Contact a doctor if:  You are afraid of falling at home.  You feel weak, drowsy, or dizzy at home.  You fall at home.  Summary  There are many simple things that you can do to make your home safe and to help prevent falls.  Ways to make your home safe include removing things that can make you trip and installing grab bars in the bathroom.  Ask for help when making these changes in your home.  This information is not intended to replace advice given to you by your health care provider. Make sure you discuss any questions you have with your health care provider.  Document Revised: 07/21/2021 Document Reviewed: 07/21/2021  [employer] Patient Education © 2022 [employer]  Fat and Cholesterol Restricted Eating Plan  Getting too much fat and cholesterol in your diet may cause health problems. Choosing the right foods helps keep your fat and cholesterol at normal levels. This can keep you from getting certain diseases.  Your doctor may recommend an eating plan that includes:  Total fat: ______% or less of total calories a day. This is ______g of fat a day.  Saturated fat: ______% or less of total calories a day. This is ______g of saturated fat a day.  Cholesterol: less than _________mg a day.  Fiber: ______g a day.  What are tips for following this plan?  General tips  Work with your doctor to lose weight if you need to.  Avoid:  Foods with added sugar.  Fried foods.  Foods with trans fat or partially hydrogenated oils. This includes some margarines and baked goods.  If you drink alcohol:  Limit how much you have to:  0-1 drink a day for women who are not pregnant.  0-2 drinks a day for men.  Know how much alcohol is in a drink. In the [place] one drink equals one 12 oz bottle of beer (355 mL), one 5 oz glass of wine (148 mL), or one 1½ oz glass of hard liquor (44 mL).  Reading food labels  Check food labels for:  Trans  "fats.  Partially hydrogenated oils.  Saturated fat (g) in each serving.  Cholesterol (mg) in each serving.  Fiber (g) in each serving.  Choose foods with healthy fats, such as:  Monounsaturated fats and polyunsaturated fats. These include olive and canola oil, flaxseeds, walnuts, almonds, and seeds.  Omega-3 fats. These are found in certain fish, flaxseed oil, and ground flaxseeds.  Choose grain products that have whole grains. Look for the word \"whole\" as the first word in the ingredient list.  Cooking  Cook foods using low-fat methods. These include baking, boiling, grilling, and broiling.  Eat more home-cooked foods. Eat at restaurants and buffets less often. Eat less fast food.  Avoid cooking using saturated fats, such as butter, cream, palm oil, palm kernel oil, and coconut oil.  Meal planning    At meals, divide your plate into four equal parts:  Fill one-half of your plate with vegetables, green salads, and fruit.  Fill one-fourth of your plate with whole grains.  Fill one-fourth of your plate with low-fat (lean) protein foods.  Eat fish that is high in omega-3 fats at least two times a week. This includes mackerel, tuna, sardines, and salmon.  Eat foods that are high in fiber, such as whole grains, beans, apples, pears, berries, broccoli, carrots, peas, and barley.  What foods should I eat?  Fruits  All fresh, canned (in natural juice), or frozen fruits.  Vegetables  Fresh or frozen vegetables (raw, steamed, roasted, or grilled). Green salads.  Grains  Whole grains, such as whole wheat or whole grain breads, crackers, cereals, and pasta. Unsweetened oatmeal, bulgur, barley, quinoa, or brown rice. Corn or whole wheat flour tortillas.  Meats and other protein foods  Ground beef (85% or leaner), grass-fed beef, or beef trimmed of fat. Skinless chicken or turkey. Ground chicken or turkey. Pork trimmed of fat. All fish and seafood. Egg whites. Dried beans, peas, or lentils. Unsalted nuts or seeds. Unsalted canned " beans. Nut butters without added sugar or oil.  Dairy  Low-fat or nonfat dairy products, such as skim or 1% milk, 2% or reduced-fat cheeses, low-fat and fat-free ricotta or cottage cheese, or plain low-fat and nonfat yogurt.  Fats and oils  Tub margarine without trans fats. Light or reduced-fat mayonnaise and salad dressings. Avocado. Olive, canola, sesame, or safflower oils.  The items listed above may not be a complete list of foods and beverages you can eat. Contact a dietitian for more information.  What foods should I avoid?  Fruits  Canned fruit in heavy syrup. Fruit in cream or butter sauce. Fried fruit.  Vegetables  Vegetables cooked in cheese, cream, or butter sauce. Fried vegetables.  Grains  White bread. White pasta. White rice. Cornbread. Bagels, pastries, and croissants. Crackers and snack foods that contain trans fat and hydrogenated oils.  Meats and other protein foods  Fatty cuts of meat. Ribs, chicken wings, ramos, sausage, bologna, salami, chitterlings, fatback, hot dogs, bratwurst, and packaged lunch meats. Liver and organ meats. Whole eggs and egg yolks. Chicken and turkey with skin. Fried meat.  Dairy  Whole or 2% milk, cream, half-and-half, and cream cheese. Whole milk cheeses. Whole-fat or sweetened yogurt. Full-fat cheeses. Nondairy creamers and whipped toppings. Processed cheese, cheese spreads, and cheese curds.  Fats and oils  Butter, stick margarine, lard, shortening, ghee, or ramos fat. Coconut, palm kernel, and palm oils.  Beverages  Alcohol. Sugar-sweetened drinks such as sodas, lemonade, and fruit drinks.  Sweets and desserts  Corn syrup, sugars, honey, and molasses. Candy. Jam and jelly. Syrup. Sweetened cereals. Cookies, pies, cakes, donuts, muffins, and ice cream.  The items listed above may not be a complete list of foods and beverages you should avoid. Contact a dietitian for more information.  Summary  Choosing the right foods helps keep your fat and cholesterol at normal  levels. This can keep you from getting certain diseases.  At meals, fill one-half of your plate with vegetables, green salads, and fruits.  Eat high fiber foods, like whole grains, beans, apples, pears, berries, carrots, peas, and barley.  Limit added sugar, saturated fats, alcohol, and fried foods.  This information is not intended to replace advice given to you by your health care provider. Make sure you discuss any questions you have with your health care provider.  Document Revised: 04/29/2022 Document Reviewed: 04/29/2022  [employer] Patient Education © 2022 [employer]

## 2022-11-01 ENCOUNTER — OFFICE VISIT (OUTPATIENT)
Dept: GASTROENTEROLOGY | Facility: CLINIC | Age: 57
End: 2022-11-01

## 2022-11-01 VITALS — WEIGHT: 151 LBS | HEIGHT: 70 IN | BODY MASS INDEX: 21.62 KG/M2

## 2022-11-01 DIAGNOSIS — D64.9 ANEMIA, UNSPECIFIED TYPE: Primary | ICD-10-CM

## 2022-11-01 PROCEDURE — 99214 OFFICE O/P EST MOD 30 MIN: CPT | Performed by: PHYSICIAN ASSISTANT

## 2022-11-01 RX ORDER — BISACODYL 5 MG
TABLET, DELAYED RELEASE (ENTERIC COATED) ORAL
Qty: 4 TABLET | Refills: 0 | Status: SHIPPED | OUTPATIENT
Start: 2022-11-01 | End: 2022-12-01

## 2022-11-01 RX ORDER — POLYETHYLENE GLYCOL 3350 17 G/17G
POWDER, FOR SOLUTION ORAL
Qty: 510 G | Refills: 0 | Status: SHIPPED | OUTPATIENT
Start: 2022-11-01 | End: 2022-12-01
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
## 2022-11-04 ENCOUNTER — APPOINTMENT (OUTPATIENT)
Dept: MAMMOGRAPHY | Facility: HOSPITAL | Age: 57
End: 2022-11-04

## 2022-11-10 ENCOUNTER — APPOINTMENT (OUTPATIENT)
Dept: MRI IMAGING | Facility: HOSPITAL | Age: 57
End: 2022-11-10

## 2022-11-16 NOTE — PLAN OF CARE
Patient Name: Dior Pettit  : 1936    MRN: [id number]                              Today's Date: 2022       Admit Date: 2022    Visit Dx:     ICD-10-CM ICD-9-CM   1. Closed displaced fracture of right femoral neck (HCC)  S72.001A 820.8   2. Fall, initial encounter  W19.XXXA E888.9     Patient Active Problem List   Diagnosis   • Essential hypertension   • Pulmonary hypertension (HCC)   • NNEKA (obstructive sleep apnea)   • Gastroesophageal reflux disease   • Anxiety   • Restless leg syndrome   • Controlled diabetes mellitus type 2 with complications ([facility])   • Hypothyroidism   • Hypercholesteremia   • Seasonal allergic rhinitis   • Mild intermittent asthma without complication   • S/P AVR   • Dilated aortic root (HCC)   • Thoracic aortic aneurysm without rupture    • Primary insomnia   • Renal insufficiency   • Macular degeneration of both eyes   • Coronary artery disease involving native coronary artery with angina pectoris ([facility])   • Rectocele   • Nonrheumatic mitral valve stenosis   • PAF (paroxysmal atrial fibrillation) ([facility])   • Slow transit constipation   • Dizziness   • Daily headache   • Symptomatic bradycardia   • Paroxysmal atrial fibrillation (HCC)   • Closed displaced fracture of right femoral neck (HCC)   • Fall     Past Medical History:   Diagnosis Date   • Allergic rhinitis    • Anemia    • Anxiety     Controlled w/Meds   • Aortic root dilatation (HCC) 10/02/2017    Borderline--Noted on Echo   • Aortic valve calcification 10/02/2017    Noted on Echo   • Aortic valve insufficiency Dx in 07    w/AVR    • Aortic valve prosthesis present 2018    Noted on CTA Chest   • Ascending aorta dilatation (HCC) 10/02/2017    Borderline--Noted on Echo   • Asthma    • Atypical chest pain    • Breast pain, right Hx   • CAD (coronary artery disease)    • Cardiomegaly    • Cervicalgia    • Chest pain due to CAD ([facility])    • 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    Borderline--Noted on  Problem: Patient Care Overview  Goal: Plan of Care Review  Outcome: Ongoing (interventions implemented as appropriate)   08/04/18 2542   Coping/Psychosocial   Plan of Care Reviewed With patient   Coping/Psychosocial   Patient Agreement with Plan of Care agrees   Plan of Care Review   Progress improving       Problem: Overarching Goals (Adult)  Goal: Adheres to Safety Considerations for Self and Others  Outcome: Ongoing (interventions implemented as appropriate)    Goal: Optimized Coping Skills in Response to Life Stressors  Outcome: Ongoing (interventions implemented as appropriate)    Goal: Develops/Participates in Therapeutic [place] to Support Successful Transition  Outcome: Ongoing (interventions implemented as appropriate)         Echo & Measured @ 2.6CM and Mid Descending @ 2.5CM on CTA Chest-11/2/18   • DM (diabetes mellitus) ([facility])     T2   • Esophagitis    • GERD (gastroesophageal reflux disease)     Controlled w/Meds   • Headache    • Health care maintenance    • Heart murmur    • History of echocardiogram 10/2/17-[facility]    EF 66%; Borderline Concentric Hypertrophy; Mild Calcification in AV; Mild AVS; Mild AVR; Moderate TVR; Borderline Dilation of Aortic Root/Arch & Borderline Dilation of Ascending/Proximal Aorta Present   • Hyperlipidemia     Controlled w/Meds   • Hypertension     Controlled w/Meds   • Hypothyroidism     Controlled w/Synthroid   • Insomnia    • Macular degeneration, left eye    • Mild aortic valve regurgitation 10/02/2017    Noted on Echo   • Mild aortic valve stenosis 10/02/2017    Noted on Echo   • Mild dilation of ascending aorta ([facility]) 10/02/2017    Borderline--Noted on Echo   • Moderate tricuspid valve regurgitation 10/02/2017    Noted on Echo   • Mood disorder in conditions classified elsewhere     Controlled w/Meds   • Near syncope 03/2017-[facility] ER   • Neuropathy    • NNEKA (obstructive sleep apnea)     Untreated   • Osteoarthritis     Ankles/Feet   • Pulmonary hypertension ([facility]) 2017   • Renal insufficiency    • RLS (restless legs syndrome)    • Thoracic ascending aortic aneurysm Dx in 2007 @ 3.8CM & 1/02/2018    Noted @ 4CM on CTA Chest;   • Visual impairment     macular degeneratuin     Past Surgical History:   Procedure Laterality Date   • AORTIC VALVE REPAIR/REPLACEMENT  2007    Dr. Perry   • APPENDECTOMY     • AUGMENTATION MAMMAPLASTY  1976   • BREAST AUGMENTATION  2014   • BUNIONECTOMY     • CARDIAC CATHETERIZATION  2007   • CARDIAC VALVE REPLACEMENT  2007    porcine   • COLONOSCOPY  2010   • COLONOSCOPY  03/02/2012   • EYE SURGERY      cataracts and ens implants   • HYSTERECTOMY  1972   • SIGMOIDOSCOPY  2001   • TOTAL HIP ARTHROPLASTY Right 11/15/2022    Procedure: Right anterior total hip arthroplasty;   Surgeon: Joao Martinez MD;  Location: [facility] MAIN OR;  Service: Orthopedics;  Laterality: Right;      General Information     [facility] Name 11/16/22 1228          Physical Therapy Time and Intention    Document Type evaluation  -     Mode of Treatment individual therapy;physical therapy  -     Row Name 11/16/22 1228          General Information    Patient Profile Reviewed yes  -     Prior Level of Function independent:;gait;transfer;bed mobility  -     Existing Precautions/Restrictions fall  -     Barriers to Rehab none identified  -     Row Name 11/16/22 1228          Living Environment    People in Home alone  -     Row Name 11/16/22 1228          Home Main Entrance    Number of Stairs, Main Entrance none  -[facility] Name 11/16/22 1228          Stairs Within Home, Primary    Number of Stairs, Within Home, Primary none  -[facility] Name 11/16/22 1228          Cognition    Orientation Status (Cognition) oriented x 3  -[facility] Name 11/16/22 1228          Safety Issues, Functional Mobility    Impairments Affecting Function (Mobility) balance;endurance/activity tolerance;strength;pain;range of motion (ROM)  -           User Key  (r) = Recorded By, (t) = Taken By, (c) = Cosigned By    Initials Name Provider Type     Galilea Jean PT Physical Therapist               Mobility     Row Name 11/16/22 1229          Bed Mobility    Bed Mobility supine-sit  -     Supine-Sit [place] (Bed Mobility) verbal cues;minimum assist (75% patient effort)  -     Assistive Device (Bed Mobility) bed rails;head of bed elevated  -[facility] Name 11/16/22 1229          Sit-Stand Transfer    Sit-Stand [place] (Transfers) verbal cues;moderate assist (50% patient effort)  -     Assistive Device (Sit-Stand Transfers) walker, front-wheeled  -     Row Name 11/16/22 1229          Gait/Stairs (Locomotion)    [place] Level (Gait) contact guard;verbal cues  -     Assistive Device (Gait) walker,  front-wheeled  -     Distance in Feet (Gait) 5ft to chair  -     Deviations/Abnormal Patterns (Gait) antalgic;base of support, wide;guille decreased;gait speed decreased;stride length decreased  -     Bilateral Gait Deviations forward flexed posture  -     Right Sided Gait Deviations weight shift ability decreased  -[facility] Name 11/16/22 1229          Mobility    Extremity Weight-bearing Status right lower extremity  -     Right Lower Extremity (Weight-bearing Status) weight-bearing as tolerated (WBAT)  -           User Key  (r) = Recorded By, (t) = Taken By, (c) = Cosigned By    Initials Name Provider Type     Galilea Jean, PT Physical Therapist               Obj/Interventions     [facility] Name 11/16/22 1230          Range of Motion Comprehensive    General Range of Motion lower extremity range of motion deficits identified  -     Comment, General Range of Motion Expected post-op ROM deficits  -[facility] Name 11/16/22 1230          Strength Comprehensive (MMT)    General Manual Muscle Testing (MMT) Assessment lower extremity strength deficits identified  -     Comment, General Manual Muscle Testing (MMT) Assessment Expected post-op strength deficits, LLE grossly 4/5, RLE grossly 3+/5  -[facility] Name 11/16/22 1230          Motor Skills    Therapeutic Exercise --  5-10 reps CLIFF protocol  -[facility] Name 11/16/22 1230          Balance    Balance Assessment sitting static balance;sitting dynamic balance;standing static balance;standing dynamic balance  -     Static Sitting Balance standby assist  -     Dynamic Sitting Balance standby assist  -     Position, Sitting Balance unsupported;sitting edge of bed  -     Static Standing Balance standby assist;verbal cues  -     Dynamic Standing Balance contact guard;verbal cues  -     Position/Device Used, Standing Balance walker, front-wheeled;supported  -     Balance Interventions sitting;standing;sit to stand;supported;static;dynamic   -     Row Name 11/16/22 1230          Sensory Assessment (Somatosensory)    Sensory Assessment (Somatosensory) LE sensation intact  -           User Key  (r) = Recorded By, (t) = Taken By, (c) = Cosigned By    Initials Name Provider Type     Galilea Jean PT Physical Therapist               Goals/Plan     Row Name 11/16/22 1235          Bed Mobility Goal 1 (PT)    Activity/Assistive Device (Bed Mobility Goal 1, PT) bed mobility activities, all  -     [place] Level/Cues Needed (Bed Mobility Goal 1, PT) standby assist  -     Time Frame (Bed Mobility Goal 1, PT) 1 week  -[facility] Name 11/16/22 1235          Transfer Goal 1 (PT)    Activity/Assistive Device (Transfer Goal 1, PT) transfers, all  -     [place] Level/Cues Needed (Transfer Goal 1, PT) contact guard required  -     Time Frame (Transfer Goal 1, PT) 1 week  -[facility] Name 11/16/22 1235          Gait Training Goal 1 (PT)    Activity/Assistive Device (Gait Training Goal 1, PT) gait (walking locomotion)  -     [place] Level (Gait Training Goal 1, PT) standby assist  -     Distance (Gait Training Goal 1, PT) 50ft  -     Time Frame (Gait Training Goal 1, PT) 1 week  -     Row Name 11/16/22 1235          Therapy Assessment/Plan (PT)    Planned Therapy Interventions (PT) balance training;bed mobility training;gait training;home exercise program;patient/family education;strengthening;ROM (range of motion);transfer training  -           User Key  (r) = Recorded By, (t) = Taken By, (c) = Cosigned By    Initials Name Provider Type     Galilea Jean PT Physical Therapist               Clinical Impression     Row Name 11/16/22 1231          Pain    Pretreatment Pain Rating 0/10 - no pain  -[facility] Name 11/16/22 1231          Plan of Care Review    Plan of Care Reviewed With patient;family  -     Outcome Evaluation Pt is an 85 yo F admitted from home with a fall - R hip fx. Pt is now POD 1 R anterior CLIFF. Pt lives  alone and reports use of a cane as needed. Pt denies other falls hx and does not have any steps at home. Pt presents to PT with impaired strength, endurance, and balance limiting overall mobility. Pt tolerated CLIFF exercises well in bed - required assisted 2/2 weakness. Pt transferred to EOB with min A x1, STS with mod A x1, and ambulated 5ft to chair with rwx and CGA. Pt cued for upright posture and sequencing of steps with rwx movement. Pt with impaired RLE WB, but able to safely get to chair with assist x1. Encouraged pt to try sitting up at least through lunchtime as well as work on ambulation to bathroom as tolerated instead of BSC as able. Pt assisted with repositioning and left with all needs met, family present. PT will continue to follow to progress mobility as tolerated. Anticipate DC to SNF.  -     Row Name 11/16/22 1231          Therapy Assessment/Plan (PT)    Patient/Family Therapy Goals Statement (PT) Return to PLOF  -     Rehab Potential (PT) good, to achieve stated therapy goals  -     Criteria for Skilled Interventions Met (PT) yes  -     Therapy Frequency (PT) daily  -     Row Name 11/16/22 1231          Vital Signs    O2 Delivery Pre Treatment supplemental O2  -     O2 Delivery Intra Treatment room air  -     O2 Delivery Post Treatment supplemental O2  -     Row Name 11/16/22 1231          Positioning and Restraints    Pre-Treatment Position in bed  -     Post Treatment Position chair  -     In Chair reclined;call light within reach;encouraged to call for assist;exit alarm on;with family/caregiver  -           User Key  (r) = Recorded By, (t) = Taken By, (c) = Cosigned By    Initials Name Provider Type     Galilea Jean, PT Physical Therapist               Outcome Measures     Row Name 11/16/22 1236          How much help from another person do you currently need...    Turning from your back to your side while in flat bed without using bedrails? 3  -     Moving from  lying on back to sitting on the side of a flat bed without bedrails? 3  -BH     Moving to and from a bed to a chair (including a wheelchair)? 3  -BH     Standing up from a chair using your arms (e.g., wheelchair, bedside chair)? 2  -BH     Climbing 3-5 steps with a railing? 1  -BH     To walk in hospital room? 3  -BH     AM-PAC 6 Clicks Score (PT) 15  -     Highest level of mobility 4 --> Transferred to chair/commode  -     Row Name 11/16/22 1236          Functional Assessment    Outcome Measure Options AM-PAC 6 Clicks Basic Mobility (PT)  -           User Key  (r) = Recorded By, (t) = Taken By, (c) = Cosigned By    Initials Name Provider Type     Galilea Jean PT Physical Therapist                             Physical Therapy Education     Title: PT OT SLP Therapies (Done)     Topic: Physical Therapy (Done)     Point: Mobility training (Done)     Learning Progress Summary           Patient Acceptance, E,TB,D, VU,NR by  at 11/16/2022 1236                   Point: Home exercise program (Done)     Learning Progress Summary           Patient Acceptance, E,TB,D, VU,NR by  at 11/16/2022 1236                   Point: Body mechanics (Done)     Learning Progress Summary           Patient Acceptance, E,TB,D, VU,NR by  at 11/16/2022 1236                   Point: Precautions (Done)     Learning Progress Summary           Patient Acceptance, E,TB,D, VU,NR by  at 11/16/2022 1236                               User Key     Initials Effective Dates Name Provider Type Discipline     04/08/22 -  Galilea Jean PT Physical Therapist PT              PT Recommendation and Plan  Planned Therapy Interventions (PT): balance training, bed mobility training, gait training, home exercise program, patient/family education, strengthening, ROM (range of motion), transfer training  Plan of Care Reviewed With: patient, family  Outcome Evaluation: Pt is an 87 yo F admitted from home with a fall - R hip fx. Pt is now POD 1 R  anterior CLIFF. Pt lives alone and reports use of a cane as needed. Pt denies other falls hx and does not have any steps at home. Pt presents to PT with impaired strength, endurance, and balance limiting overall mobility. Pt tolerated CLIFF exercises well in bed - required assisted 2/2 weakness. Pt transferred to EOB with min A x1, STS with mod A x1, and ambulated 5ft to chair with rwx and CGA. Pt cued for upright posture and sequencing of steps with rwx movement. Pt with impaired RLE WB, but able to safely get to chair with assist x1. Encouraged pt to try sitting up at least through lunchtime as well as work on ambulation to bathroom as tolerated instead of BSC as able. Pt assisted with repositioning and left with all needs met, family present. PT will continue to follow to progress mobility as tolerated. Anticipate DC to SNF.     Time Calculation:    PT Charges     Row Name 11/16/22 1237             Time Calculation    Start Time 1038  -      Stop Time 1103  -      Time Calculation (min) 25 min  -      PT Received On 11/16/22  -      PT - Next Appointment 11/17/22  -      PT Goal Re-Cert Due Date 11/23/22  -         Time Calculation- PT    Total Timed Code Minutes- PT 23 minute(s)  -         Timed Charges    57142 - Gait Training Minutes  10  -      06082 - PT Therapeutic Activity Minutes 13  -BH         Untimed Charges    PT Eval/Re-eval Minutes 5  -BH         Total Minutes    Timed Charges Total Minutes 23  -BH      Untimed Charges Total Minutes 5  -BH       Total Minutes 28  -BH            User Key  (r) = Recorded By, (t) = Taken By, (c) = Cosigned By    Initials Name Provider Type     Galilea Jean PT Physical Therapist              Therapy Charges for Today     Code Description Service Date Service Provider Modifiers Qty    03242743166 HC GAIT TRAINING EA 15 MIN 11/16/2022 Galilea Jean, PT GP 1    24190525776 HC PT THERAPEUTIC ACT EA 15 MIN 11/16/2022 Galilea Jean, PT GP 1     [id number]  PT EVAL LOW COMPLEXITY 3 11/16/2022 Galilea Jean, PT GP 1          PT G-Codes  Outcome Measure Options: AM-PAC 6 Clicks Basic Mobility (PT)  AM-PAC 6 Clicks Score (PT): 15  PT Discharge Summary  Anticipated Discharge Disposition (PT): skilled nursing facility    Galilea Jean PT  11/16/2022

## 2022-11-21 ENCOUNTER — APPOINTMENT (OUTPATIENT)
Dept: MAMMOGRAPHY | Facility: HOSPITAL | Age: 57
End: 2022-11-21

## 2022-11-29 ENCOUNTER — LAB (OUTPATIENT)
Dept: FAMILY MEDICINE CLINIC | Facility: CLINIC | Age: 57
End: 2022-11-29

## 2022-11-29 DIAGNOSIS — D50.0 BLOOD LOSS ANEMIA: ICD-10-CM

## 2022-11-29 DIAGNOSIS — E78.2 MIXED HYPERLIPIDEMIA: Chronic | ICD-10-CM

## 2022-11-29 DIAGNOSIS — F41.1 GAD (GENERALIZED ANXIETY DISORDER): Chronic | ICD-10-CM

## 2022-11-29 PROCEDURE — 80053 COMPREHEN METABOLIC PANEL: CPT | Performed by: PHYSICIAN ASSISTANT

## 2022-11-29 PROCEDURE — 85025 COMPLETE CBC W/AUTO DIFF WBC: CPT | Performed by: PHYSICIAN ASSISTANT

## 2022-11-29 PROCEDURE — 36415 COLL VENOUS BLD VENIPUNCTURE: CPT | Performed by: PHYSICIAN ASSISTANT

## 2022-11-29 PROCEDURE — 85007 BL SMEAR W/DIFF WBC COUNT: CPT | Performed by: PHYSICIAN ASSISTANT

## 2022-11-29 PROCEDURE — 80061 LIPID PANEL: CPT | Performed by: PHYSICIAN ASSISTANT

## 2022-11-30 LAB
ALBUMIN SERPL-MCNC: 4.2 G/DL (ref 3.5–5.2)
ALBUMIN/GLOB SERPL: 1.4 G/DL
ALP SERPL-CCNC: 91 U/L (ref 39–117)
ALT SERPL W P-5'-P-CCNC: 11 U/L (ref 1–33)
ANION GAP SERPL CALCULATED.3IONS-SCNC: 12 MMOL/L (ref 5–15)
AST SERPL-CCNC: 20 U/L (ref 1–32)
BASOPHILS # BLD AUTO: 0.08 10*3/MM3 (ref 0–0.2)
BASOPHILS NFR BLD AUTO: 0.9 % (ref 0–1.5)
BILIRUB SERPL-MCNC: <0.2 MG/DL (ref 0–1.2)
BUN SERPL-MCNC: 8 MG/DL (ref 6–20)
BUN/CREAT SERPL: 12.5 (ref 7–25)
CALCIUM SPEC-SCNC: 9.7 MG/DL (ref 8.6–10.5)
CHLORIDE SERPL-SCNC: 102 MMOL/L (ref 98–107)
CHOLEST SERPL-MCNC: 222 MG/DL (ref 0–200)
CO2 SERPL-SCNC: 26 MMOL/L (ref 22–29)
CREAT SERPL-MCNC: 0.64 MG/DL (ref 0.57–1)
DEPRECATED RDW RBC AUTO: 50.3 FL (ref 37–54)
EGFRCR SERPLBLD CKD-EPI 2021: 104.5 ML/MIN/1.73
EOSINOPHIL # BLD AUTO: 0.13 10*3/MM3 (ref 0–0.4)
EOSINOPHIL NFR BLD AUTO: 1.4 % (ref 0.3–6.2)
ERYTHROCYTE [DISTWIDTH] IN BLOOD BY AUTOMATED COUNT: 14.5 % (ref 12.3–15.4)
GLOBULIN UR ELPH-MCNC: 2.9 GM/DL
GLUCOSE SERPL-MCNC: 98 MG/DL (ref 65–99)
HCT VFR BLD AUTO: 41.3 % (ref 34–46.6)
HDLC SERPL-MCNC: 45 MG/DL (ref 40–60)
HGB BLD-MCNC: 13.5 G/DL (ref 12–15.9)
IMM GRANULOCYTES # BLD AUTO: 0.03 10*3/MM3 (ref 0–0.05)
IMM GRANULOCYTES NFR BLD AUTO: 0.3 % (ref 0–0.5)
LDLC SERPL CALC-MCNC: 134 MG/DL (ref 0–100)
LDLC/HDLC SERPL: 2.87 {RATIO}
LYMPHOCYTES # BLD AUTO: 2.17 10*3/MM3 (ref 0.7–3.1)
LYMPHOCYTES NFR BLD AUTO: 23.3 % (ref 19.6–45.3)
MCH RBC QN AUTO: 31.3 PG (ref 26.6–33)
MCHC RBC AUTO-ENTMCNC: 32.7 G/DL (ref 31.5–35.7)
MCV RBC AUTO: 95.6 FL (ref 79–97)
MONOCYTES # BLD AUTO: 0.89 10*3/MM3 (ref 0.1–0.9)
MONOCYTES NFR BLD AUTO: 9.6 % (ref 5–12)
NEUTROPHILS NFR BLD AUTO: 6 10*3/MM3 (ref 1.7–7)
NEUTROPHILS NFR BLD AUTO: 64.5 % (ref 42.7–76)
NRBC BLD AUTO-RTO: 0 /100 WBC (ref 0–0.2)
PLATELET # BLD AUTO: 362 10*3/MM3 (ref 140–450)
PMV BLD AUTO: 10.6 FL (ref 6–12)
POTASSIUM SERPL-SCNC: 4.2 MMOL/L (ref 3.5–5.2)
PROT SERPL-MCNC: 7.1 G/DL (ref 6–8.5)
RBC # BLD AUTO: 4.32 10*6/MM3 (ref 3.77–5.28)
SODIUM SERPL-SCNC: 140 MMOL/L (ref 136–145)
TRIGL SERPL-MCNC: 240 MG/DL (ref 0–150)
VLDLC SERPL-MCNC: 43 MG/DL (ref 5–40)
WBC NRBC COR # BLD: 9.3 10*3/MM3 (ref 3.4–10.8)

## 2022-12-01 ENCOUNTER — OFFICE VISIT (OUTPATIENT)
Dept: FAMILY MEDICINE CLINIC | Facility: CLINIC | Age: 57
End: 2022-12-01

## 2022-12-01 VITALS
BODY MASS INDEX: 21.4 KG/M2 | HEART RATE: 103 BPM | SYSTOLIC BLOOD PRESSURE: 120 MMHG | HEIGHT: 72 IN | OXYGEN SATURATION: 100 % | DIASTOLIC BLOOD PRESSURE: 80 MMHG | TEMPERATURE: 97.7 F | WEIGHT: 158 LBS

## 2022-12-01 DIAGNOSIS — J41.8 MIXED SIMPLE AND MUCOPURULENT CHRONIC BRONCHITIS: Chronic | ICD-10-CM

## 2022-12-01 DIAGNOSIS — E78.2 MIXED HYPERLIPIDEMIA: Chronic | ICD-10-CM

## 2022-12-01 DIAGNOSIS — F51.04 PSYCHOPHYSIOLOGICAL INSOMNIA: Primary | Chronic | ICD-10-CM

## 2022-12-01 DIAGNOSIS — Z12.11 SCREENING FOR COLON CANCER: ICD-10-CM

## 2022-12-01 DIAGNOSIS — F41.1 GAD (GENERALIZED ANXIETY DISORDER): Chronic | ICD-10-CM

## 2022-12-01 PROCEDURE — 99214 OFFICE O/P EST MOD 30 MIN: CPT | Performed by: PHYSICIAN ASSISTANT

## 2022-12-01 RX ORDER — ROSUVASTATIN CALCIUM 10 MG/1
10 TABLET, COATED ORAL DAILY
Qty: 30 TABLET | Refills: 5 | Status: SHIPPED | OUTPATIENT
Start: 2022-12-01

## 2022-12-01 RX ORDER — ALBUTEROL SULFATE 90 UG/1
2 AEROSOL, METERED RESPIRATORY (INHALATION) EVERY 4 HOURS PRN
Qty: 18 G | Refills: 5 | Status: SHIPPED | OUTPATIENT
Start: 2022-12-01

## 2022-12-01 RX ORDER — BUSPIRONE HYDROCHLORIDE 10 MG/1
10 TABLET ORAL 2 TIMES DAILY PRN
Qty: 60 TABLET | Refills: 2
Start: 2022-12-01 | End: 2023-02-14

## 2022-12-01 RX ORDER — AMITRIPTYLINE HYDROCHLORIDE 50 MG/1
50 TABLET, FILM COATED ORAL NIGHTLY
Qty: 30 TABLET | Refills: 5 | Status: SHIPPED | OUTPATIENT
Start: 2022-12-01 | End: 2023-02-14 | Stop reason: SDUPTHER

## 2022-12-02 ENCOUNTER — APPOINTMENT (OUTPATIENT)
Dept: MRI IMAGING | Facility: HOSPITAL | Age: 57
End: 2022-12-02

## 2022-12-02 NOTE — PROGRESS NOTES
"Heather Samano is a 57 y.o. female.       Chief Complaint -insomnia    History of Present Illness -    ROS    Insomnia-  She complains of trouble going to sleep and staying asleep.  Minimal relief with Tylenol PM or melatonin in the past.  Onset greater than 1 year.    Generalized anxiety disorder-  Stable with BuSpar use as needed    COPD-  Stable with albuterol HFA    Hyperlipidemia- not at goal with recent lab work abnormal.  Discussed lab work with patient today.  She states that she does eat a low-cholesterol diet and has family history of high cholesterol and heart problems.  Lab Results   Component Value Date    CHOL 222 (H) 11/29/2022     Lab Results   Component Value Date    TRIG 240 (H) 11/29/2022     Lab Results   Component Value Date    HDL 45 11/29/2022     Lab Results   Component Value Date     (H) 11/29/2022         The following portions of the patient's history were reviewed and updated as appropriate: allergies, current medications, past family history, past medical history, past social history, past surgical history and problem list.    Review of Systems    Objective  Vital signs:  /80 (BP Location: Right arm, Patient Position: Sitting, Cuff Size: Adult)   Pulse 103   Temp 97.7 °F (36.5 °C) (Temporal)   Ht 188.8 cm (74.33\")   Wt 71.7 kg (158 lb)   SpO2 100%   BMI 20.11 kg/m²     Physical Exam  Vitals and nursing note reviewed.   Constitutional:       Appearance: Normal appearance. She is well-developed.   Eyes:      Extraocular Movements: Extraocular movements intact.      Conjunctiva/sclera: Conjunctivae normal.   Cardiovascular:      Rate and Rhythm: Normal rate and regular rhythm.      Heart sounds: Normal heart sounds. No murmur heard.  Pulmonary:      Effort: Pulmonary effort is normal. No respiratory distress.      Breath sounds: Normal breath sounds. No wheezing.   Musculoskeletal:         General: No tenderness.   Skin:     General: Skin is warm and dry.      " Findings: No rash.   Neurological:      Mental Status: She is alert and oriented to person, place, and time.   Psychiatric:         Mood and Affect: Mood normal.         Behavior: Behavior normal.         Thought Content: Thought content normal.         The following data was reviewed by: TWAN Almanza on 12/01/2022:  CMP    CMP 7/9/22 9/27/22 11/29/22   Glucose 117 (A) 100 (A) 98   BUN 12 6 8   Creatinine 0.77 0.56 (A) 0.64   Sodium 133 (A) 140 140   Potassium 4.3 4.0 4.2   Chloride 97 (A) 104 102   Calcium 9.2 8.9 9.7   Albumin 4.29 3.30 (A) 4.20   Total Bilirubin 0.2 <0.2 <0.2   Alkaline Phosphatase 128 (A) 73 91   AST (SGOT) 62 (A) 17 20   ALT (SGPT) 33 16 11   (A) Abnormal value            Lipid Panel    Lipid Panel 11/29/22   Total Cholesterol 222 (A)   Triglycerides 240 (A)   HDL Cholesterol 45   VLDL Cholesterol 43 (A)   LDL Cholesterol  134 (A)   LDL/HDL Ratio 2.87   (A) Abnormal value                           Assessment & Plan     Diagnoses and all orders for this visit:    1. Psychophysiological insomnia (Primary)  Comments:  Start amitriptyline nightly  Advised tips for healthy sleep  Orders:  -     amitriptyline (ELAVIL) 50 MG tablet; Take 1 tablet by mouth Every Night.  Dispense: 30 tablet; Refill: 5    2. Screening for colon cancer  -     Cologuard - Stool, Per Rectum; Future    3. MICHAEL (generalized anxiety disorder)  Comments:  Continue BuSpar as needed for anxiety  Orders:  -     busPIRone (BUSPAR) 10 MG tablet; Take 1 tablet by mouth 2 (Two) Times a Day As Needed (anxiety).  Dispense: 60 tablet; Refill: 2    4. Mixed simple and mucopurulent chronic bronchitis (HCC)  Comments:  Continue albuterol HFA as needed  Orders:  -     albuterol sulfate  (90 Base) MCG/ACT inhaler; Inhale 2 puffs Every 4 (Four) Hours As Needed for Shortness of Air.  Dispense: 18 g; Refill: 5    5. Mixed hyperlipidemia  Comments:  Start rosuvastatin  Advised low-cholesterol diet  Plan to recheck lab work in 8  weeks  Orders:  -     rosuvastatin (Crestor) 10 MG tablet; Take 1 tablet by mouth Daily.  Dispense: 30 tablet; Refill: 5  -     Comprehensive Metabolic Panel; Future  -     Lipid Panel; Future    Other orders  -     Pneumococcal Conjugate Vaccine 20-Valent (PCV20)            Patient was given instructions and counseling regarding his condition or for health maintenance advice. Please see specific information pulled into the AVS if appropriate      This document has been electronically signed by:  Mary Egan PA-C

## 2022-12-05 ENCOUNTER — HOSPITAL ENCOUNTER (OUTPATIENT)
Dept: MRI IMAGING | Facility: HOSPITAL | Age: 57
Discharge: HOME | End: 2022-12-05
Admitting: PHYSICIAN ASSISTANT

## 2022-12-05 DIAGNOSIS — M51.36 DDD (DEGENERATIVE DISC DISEASE), LUMBAR: ICD-10-CM

## 2022-12-05 DIAGNOSIS — D50.0 IRON DEFICIENCY ANEMIA DUE TO CHRONIC BLOOD LOSS: ICD-10-CM

## 2022-12-05 DIAGNOSIS — G62.9 NEUROPATHY: ICD-10-CM

## 2022-12-05 DIAGNOSIS — R26.81 UNSTEADY GAIT: ICD-10-CM

## 2022-12-05 PROBLEM — D64.9 ANEMIA: Status: ACTIVE | Noted: 2022-12-05

## 2022-12-05 PROCEDURE — 72148 MRI LUMBAR SPINE W/O DYE: CPT

## 2022-12-05 PROCEDURE — 72148 MRI LUMBAR SPINE W/O DYE: CPT | Performed by: RADIOLOGY

## 2022-12-29 ENCOUNTER — APPOINTMENT (OUTPATIENT)
Dept: MAMMOGRAPHY | Facility: HOSPITAL | Age: 57
End: 2022-12-29

## 2023-01-26 ENCOUNTER — OFFICE VISIT (OUTPATIENT)
Dept: FAMILY MEDICINE CLINIC | Facility: CLINIC | Age: 58
End: 2023-01-26
Payer: COMMERCIAL

## 2023-01-26 VITALS
WEIGHT: 158 LBS | SYSTOLIC BLOOD PRESSURE: 144 MMHG | HEIGHT: 70 IN | HEART RATE: 104 BPM | OXYGEN SATURATION: 100 % | BODY MASS INDEX: 22.62 KG/M2 | DIASTOLIC BLOOD PRESSURE: 94 MMHG | TEMPERATURE: 97.1 F

## 2023-01-26 DIAGNOSIS — I10 PRIMARY HYPERTENSION: Chronic | ICD-10-CM

## 2023-01-26 DIAGNOSIS — M51.36 DDD (DEGENERATIVE DISC DISEASE), LUMBAR: Primary | Chronic | ICD-10-CM

## 2023-01-26 DIAGNOSIS — M62.838 MUSCLE SPASM: Chronic | ICD-10-CM

## 2023-01-26 DIAGNOSIS — K27.9 PUD (PEPTIC ULCER DISEASE): Chronic | ICD-10-CM

## 2023-01-26 DIAGNOSIS — E78.2 MIXED HYPERLIPIDEMIA: Chronic | ICD-10-CM

## 2023-01-26 DIAGNOSIS — R93.7 ABNORMAL MRI, SPINE: ICD-10-CM

## 2023-01-26 PROCEDURE — 99214 OFFICE O/P EST MOD 30 MIN: CPT | Performed by: PHYSICIAN ASSISTANT

## 2023-01-26 RX ORDER — PREDNISONE 20 MG/1
TABLET ORAL
Qty: 16 TABLET | Refills: 0 | Status: SHIPPED | OUTPATIENT
Start: 2023-01-26 | End: 2023-02-05

## 2023-01-26 RX ORDER — TIZANIDINE 4 MG/1
4 TABLET ORAL NIGHTLY PRN
Qty: 30 TABLET | Refills: 2 | Status: SHIPPED | OUTPATIENT
Start: 2023-01-26 | End: 2023-03-09

## 2023-01-26 NOTE — PROGRESS NOTES
"Heather Samano is a 57 y.o. female.       Chief Complaint -degenerative disc disease    History of Present Illness -    ROS    Degenerative disc disease-  Patient has known degenerative disc disease in her lumbar spine with associated weakness in bilateral lower extremities.  She reports over 1 year history of unsteady gait.She denies any loss of bowel or bladder function.  She does have associated muscle spasms and states that she has trouble sleeping at night due to the spasms and discomfort in her back.  She is unable to tolerate NSAIDs secondary to peptic ulcer disease.    12/5/2022 MRI of lumbar spine revealed:  1.  Advanced multiple level degenerative disc disease with facet arthropathy and shortening of pedicles resulting in multilevel central canal and neuroforaminal stenosis.  2.  No fracture or traumatic malalignment  3.  Edema of the left L2 transverse process which can be seen with stress related change or bone contusion.    Hypertension-  Likely elevated today secondary to pain and discomfort with her back.  Patient reports blood pressure at home is usually less than 130/80 with dietary modification    Peptic ulcer disease-stable with pantoprazole    Hyperlipidemia-stable with low-cholesterol diet    The following portions of the patient's history were reviewed and updated as appropriate: allergies, current medications, past family history, past medical history, past social history, past surgical history and problem list.    Review of Systems    Objective  Vital signs:  /94   Pulse 104   Temp 97.1 °F (36.2 °C) (Temporal)   Ht 177.8 cm (70\")   Wt 71.7 kg (158 lb)   SpO2 100%   BMI 22.67 kg/m²     Physical Exam  Vitals and nursing note reviewed.   Constitutional:       Appearance: Normal appearance. She is well-developed.   Eyes:      Extraocular Movements: Extraocular movements intact.      Conjunctiva/sclera: Conjunctivae normal.   Cardiovascular:      Rate and Rhythm: Normal rate " and regular rhythm.      Heart sounds: Normal heart sounds. No murmur heard.  Pulmonary:      Effort: Pulmonary effort is normal. No respiratory distress.      Breath sounds: Normal breath sounds. No wheezing.   Musculoskeletal:         General: Tenderness present.      Comments: Patient has slow gait and is using walker to ambulate today.  Decreased range of joint motion with lumbar flexion   Skin:     General: Skin is warm and dry.      Findings: No rash.   Neurological:      Mental Status: She is alert and oriented to person, place, and time.   Psychiatric:         Mood and Affect: Mood normal.         Behavior: Behavior normal.         Thought Content: Thought content normal.         The following data was reviewed by: TWAN Almanza on 01/26/2023:  CMP    CMP 7/9/22 9/27/22 11/29/22   Glucose 117 (A) 100 (A) 98   BUN 12 6 8   Creatinine 0.77 0.56 (A) 0.64   eGFR 91.8 108.6 104.5   Sodium 133 (A) 140 140   Potassium 4.3 4.0 4.2   Chloride 97 (A) 104 102   Calcium 9.2 8.9 9.7   Total Protein 7.6 5.5 (A) 7.1   Albumin 4.29 3.30 (A) 4.20   Globulin 3.3 2.2 2.9   Total Bilirubin 0.2 <0.2 <0.2   Alkaline Phosphatase 128 (A) 73 91   AST (SGOT) 62 (A) 17 20   ALT (SGPT) 33 16 11   Albumin/Globulin Ratio 1.3 1.5 1.4   BUN/Creatinine Ratio 15.6 10.7 12.5   Anion Gap 12.3 9.3 12.0   (A) Abnormal value       Comments are available for some flowsheets but are not being displayed.           CBC w/diff    CBC w/Diff 7/9/22 9/27/22 11/29/22   WBC 5.62 5.58 9.30   RBC 3.97 2.99 (A) 4.32   Hemoglobin 14.5 9.7 (A) 13.5   Hematocrit 42.2 30.0 (A) 41.3   .3 (A) 100.3 (A) 95.6   MCH 36.5 (A) 32.4 31.3   MCHC 34.4 32.3 32.7   RDW 11.9 (A) 15.3 14.5   Platelets 193 282 362   Neutrophil Rel % 52.6 64.7 64.5   Immature Granulocyte Rel % 0.2 0.2 0.3   Lymphocyte Rel % 34.9 17.9 (A) 23.3   Monocyte Rel % 9.8 13.6 (A) 9.6   Eosinophil Rel % 1.4 2.7 1.4   Basophil Rel % 1.1 0.9 0.9   (A) Abnormal value            Lipid Panel     Lipid Panel 11/29/22   Total Cholesterol 222 (A)   Triglycerides 240 (A)   HDL Cholesterol 45   VLDL Cholesterol 43 (A)   LDL Cholesterol  134 (A)   LDL/HDL Ratio 2.87   (A) Abnormal value                    Data reviewed: Radiologic studies MRI lumbar spine reviewed       Assessment & Plan     Diagnoses and all orders for this visit:    1. DDD (degenerative disc disease), lumbar (Primary)  Comments:  Start prednisone short-term  Advised physical therapy  Refer to neurosurgery for further evaluation and treatment options  Orders:  -     predniSONE (DELTASONE) 20 MG tablet; 3 tabs daily x 3 days then 2 tabs qd x 3 days then 2 tabs qd x 3 days then 1 tab qd x 2 days then 1/2 tab qd x 4 days  Dispense: 16 tablet; Refill: 0    2. Muscle spasm  Comments:  Start Zanaflex nightly as needed  Orders:  -     tiZANidine (ZANAFLEX) 4 MG tablet; Take 1 tablet by mouth At Night As Needed for Muscle Spasms.  Dispense: 30 tablet; Refill: 2    3. Abnormal MRI, spine  Comments:  Discussed MRI lumbar spine results with patient  Advised activity as  Orders:  -     Ambulatory Referral to Neurosurgery    4. Primary hypertension  Comments:  Advised home BP monitoring and report any consistent readings greater 130/80  Advise low-sodium diet    5. Mixed hyperlipidemia  Comments:  Advised low-cholesterol diet    6. PUD (peptic ulcer disease)  Comments:  Continue pantoprazole  Patient unable to tolerate NSAIDs  Advised Tylenol as needed pain            Patient was given instructions and counseling regarding his condition or for health maintenance advice. Please see specific information pulled into the AVS if appropriate      This document has been electronically signed by:  Mary Egan PA-C

## 2023-01-31 DIAGNOSIS — K26.9 MULTIPLE DUODENAL ULCERS: Chronic | ICD-10-CM

## 2023-01-31 RX ORDER — SUCRALFATE 1 G/1
TABLET ORAL
Qty: 120 TABLET | Refills: 1 | Status: SHIPPED | OUTPATIENT
Start: 2023-01-31 | End: 2023-04-03

## 2023-02-14 ENCOUNTER — OFFICE VISIT (OUTPATIENT)
Dept: PSYCHIATRY | Facility: CLINIC | Age: 58
End: 2023-02-14
Payer: COMMERCIAL

## 2023-02-14 VITALS
HEART RATE: 91 BPM | SYSTOLIC BLOOD PRESSURE: 148 MMHG | BODY MASS INDEX: 23.96 KG/M2 | WEIGHT: 167 LBS | OXYGEN SATURATION: 99 % | DIASTOLIC BLOOD PRESSURE: 88 MMHG

## 2023-02-14 DIAGNOSIS — F41.9 ANXIETY DISORDER, UNSPECIFIED TYPE: ICD-10-CM

## 2023-02-14 DIAGNOSIS — F51.04 PSYCHOPHYSIOLOGICAL INSOMNIA: Chronic | ICD-10-CM

## 2023-02-14 DIAGNOSIS — F39 UNSPECIFIED MOOD (AFFECTIVE) DISORDER: Primary | ICD-10-CM

## 2023-02-14 PROCEDURE — 90792 PSYCH DIAG EVAL W/MED SRVCS: CPT | Performed by: NURSE PRACTITIONER

## 2023-02-14 RX ORDER — AMITRIPTYLINE HYDROCHLORIDE 25 MG/1
25 TABLET, FILM COATED ORAL NIGHTLY
Qty: 7 TABLET | Refills: 0 | Status: SHIPPED | OUTPATIENT
Start: 2023-02-14 | End: 2023-02-21
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
## 2023-02-21 ENCOUNTER — TELEPHONE (OUTPATIENT)
Dept: PSYCHIATRY | Facility: CLINIC | Age: 58
End: 2023-02-21
Payer: COMMERCIAL

## 2023-02-21 RX ORDER — DOXEPIN HYDROCHLORIDE 10 MG/1
10 CAPSULE ORAL NIGHTLY
Qty: 30 CAPSULE | Refills: 0 | Status: SHIPPED | OUTPATIENT
Start: 2023-02-21 | End: 2023-02-28

## 2023-02-28 ENCOUNTER — OFFICE VISIT (OUTPATIENT)
Dept: PSYCHIATRY | Facility: CLINIC | Age: 58
End: 2023-02-28
Payer: COMMERCIAL

## 2023-02-28 VITALS
HEART RATE: 110 BPM | SYSTOLIC BLOOD PRESSURE: 146 MMHG | WEIGHT: 167 LBS | DIASTOLIC BLOOD PRESSURE: 84 MMHG | BODY MASS INDEX: 23.96 KG/M2

## 2023-02-28 DIAGNOSIS — F51.04 PSYCHOPHYSIOLOGICAL INSOMNIA: ICD-10-CM

## 2023-02-28 DIAGNOSIS — F41.9 ANXIETY DISORDER, UNSPECIFIED TYPE: ICD-10-CM

## 2023-02-28 DIAGNOSIS — F39 UNSPECIFIED MOOD (AFFECTIVE) DISORDER: Primary | ICD-10-CM

## 2023-02-28 PROCEDURE — 1159F MED LIST DOCD IN RCRD: CPT | Performed by: NURSE PRACTITIONER

## 2023-02-28 PROCEDURE — 99213 OFFICE O/P EST LOW 20 MIN: CPT | Performed by: NURSE PRACTITIONER

## 2023-02-28 PROCEDURE — 3079F DIAST BP 80-89 MM HG: CPT | Performed by: NURSE PRACTITIONER

## 2023-02-28 PROCEDURE — 3077F SYST BP >= 140 MM HG: CPT | Performed by: NURSE PRACTITIONER

## 2023-02-28 PROCEDURE — 1160F RVW MEDS BY RX/DR IN RCRD: CPT | Performed by: NURSE PRACTITIONER

## 2023-02-28 RX ORDER — BUSPIRONE HYDROCHLORIDE 10 MG/1
10 TABLET ORAL 2 TIMES DAILY PRN
COMMUNITY
Start: 2023-02-14 | End: 2023-02-28 | Stop reason: SDUPTHER

## 2023-02-28 RX ORDER — AMITRIPTYLINE HYDROCHLORIDE 25 MG/1
25 TABLET, FILM COATED ORAL
COMMUNITY
Start: 2023-02-21 | End: 2023-02-28

## 2023-02-28 RX ORDER — BUSPIRONE HYDROCHLORIDE 10 MG/1
10 TABLET ORAL 2 TIMES DAILY PRN
Qty: 30 TABLET | Refills: 0 | Status: SHIPPED | OUTPATIENT
Start: 2023-02-28 | End: 2023-04-03 | Stop reason: SDUPTHER

## 2023-02-28 RX ORDER — DOXEPIN HYDROCHLORIDE 10 MG/1
10 CAPSULE ORAL NIGHTLY
Qty: 30 CAPSULE | Refills: 0
Start: 2023-02-28 | End: 2023-04-03 | Stop reason: SDUPTHER

## 2023-03-07 ENCOUNTER — TELEPHONE (OUTPATIENT)
Dept: PSYCHIATRY | Facility: CLINIC | Age: 58
End: 2023-03-07
Payer: COMMERCIAL

## 2023-03-07 NOTE — TELEPHONE ENCOUNTER
03/07/23 at 4:51pm Contacted patient regarding Doxepin and sleep. Patient states she has been sleeping good and doing ok. Patient states biggest issue at the moment is back pain. Patient denies any thoughts to harm self or others. Denies any side effects. Discussed with patient will continue medication at current dose and re evaluate at next scheduled visit. Patient acknowledged and agreed. Discussed with patient to contact the office with any questions or concerns.

## 2023-03-09 ENCOUNTER — OFFICE VISIT (OUTPATIENT)
Dept: FAMILY MEDICINE CLINIC | Facility: CLINIC | Age: 58
End: 2023-03-09
Payer: COMMERCIAL

## 2023-03-09 VITALS
BODY MASS INDEX: 24.34 KG/M2 | WEIGHT: 170 LBS | SYSTOLIC BLOOD PRESSURE: 126 MMHG | HEART RATE: 113 BPM | HEIGHT: 70 IN | DIASTOLIC BLOOD PRESSURE: 80 MMHG | TEMPERATURE: 96.8 F | OXYGEN SATURATION: 99 %

## 2023-03-09 DIAGNOSIS — K21.9 GASTROESOPHAGEAL REFLUX DISEASE WITHOUT ESOPHAGITIS: Chronic | ICD-10-CM

## 2023-03-09 DIAGNOSIS — M62.838 MUSCLE SPASM: ICD-10-CM

## 2023-03-09 DIAGNOSIS — E78.2 MIXED HYPERLIPIDEMIA: Chronic | ICD-10-CM

## 2023-03-09 DIAGNOSIS — F33.42 RECURRENT MAJOR DEPRESSIVE DISORDER, IN FULL REMISSION: Chronic | ICD-10-CM

## 2023-03-09 DIAGNOSIS — M54.42 CHRONIC LEFT-SIDED LOW BACK PAIN WITH LEFT-SIDED SCIATICA: Primary | ICD-10-CM

## 2023-03-09 DIAGNOSIS — G89.29 CHRONIC LEFT-SIDED LOW BACK PAIN WITH LEFT-SIDED SCIATICA: Primary | ICD-10-CM

## 2023-03-09 PROCEDURE — 96372 THER/PROPH/DIAG INJ SC/IM: CPT | Performed by: PHYSICIAN ASSISTANT

## 2023-03-09 PROCEDURE — 1160F RVW MEDS BY RX/DR IN RCRD: CPT | Performed by: PHYSICIAN ASSISTANT

## 2023-03-09 PROCEDURE — 3074F SYST BP LT 130 MM HG: CPT | Performed by: PHYSICIAN ASSISTANT

## 2023-03-09 PROCEDURE — 99214 OFFICE O/P EST MOD 30 MIN: CPT | Performed by: PHYSICIAN ASSISTANT

## 2023-03-09 PROCEDURE — 3079F DIAST BP 80-89 MM HG: CPT | Performed by: PHYSICIAN ASSISTANT

## 2023-03-09 PROCEDURE — 1159F MED LIST DOCD IN RCRD: CPT | Performed by: PHYSICIAN ASSISTANT

## 2023-03-09 RX ORDER — IBUPROFEN 800 MG/1
800 TABLET ORAL 3 TIMES DAILY PRN
Qty: 90 TABLET | Refills: 5 | Status: SHIPPED | OUTPATIENT
Start: 2023-03-09

## 2023-03-09 RX ORDER — TIZANIDINE HYDROCHLORIDE 6 MG/1
6 CAPSULE, GELATIN COATED ORAL 2 TIMES DAILY PRN
Qty: 60 CAPSULE | Refills: 1 | Status: SHIPPED | OUTPATIENT
Start: 2023-03-09 | End: 2023-04-03

## 2023-03-09 RX ORDER — METHYLPREDNISOLONE ACETATE 80 MG/ML
80 INJECTION, SUSPENSION INTRA-ARTICULAR; INTRALESIONAL; INTRAMUSCULAR; SOFT TISSUE ONCE
Status: COMPLETED | OUTPATIENT
Start: 2023-03-09 | End: 2023-03-09

## 2023-03-09 RX ORDER — KETOROLAC TROMETHAMINE 30 MG/ML
60 INJECTION, SOLUTION INTRAMUSCULAR; INTRAVENOUS ONCE
Status: COMPLETED | OUTPATIENT
Start: 2023-03-09 | End: 2023-03-09

## 2023-03-09 RX ADMIN — METHYLPREDNISOLONE ACETATE 80 MG: 80 INJECTION, SUSPENSION INTRA-ARTICULAR; INTRALESIONAL; INTRAMUSCULAR; SOFT TISSUE at 15:09

## 2023-03-09 RX ADMIN — KETOROLAC TROMETHAMINE 60 MG: 30 INJECTION, SOLUTION INTRAMUSCULAR; INTRAVENOUS at 15:08

## 2023-03-09 NOTE — PROGRESS NOTES
"Subjective   Ailyn Samano is a 57 y.o. female.       Chief Complaint -back pain    History of Present Illness -    ROS    Back pain-  Patient complains of moderate to severe left-sided low back pain with associated burning and muscle spasms.  Minimal relief with Zanaflex 4 mg daily.  She does have pending appointment with orthopedic surgery but that is not for 2 weeks.  No known specific new injury.    Depression-  Controlled with buspirone.  She is doing well and denies any hallucinations SI or HI    Hyperlipidemia-  Stable with rosuvastatin and low-cholesterol diet    Gastroesophageal reflux disease-stable with pantoprazole    The following portions of the patient's history were reviewed and updated as appropriate: allergies, current medications, past family history, past medical history, past social history, past surgical history and problem list.    Review of Systems    Objective  Vital signs:  /80   Pulse 113   Temp 96.8 °F (36 °C) (Temporal)   Ht 177.8 cm (70\")   Wt 77.1 kg (170 lb)   SpO2 99%   BMI 24.39 kg/m²     Physical Exam  Vitals and nursing note reviewed.   Constitutional:       Appearance: Normal appearance. She is well-developed.      Comments: Patient is in wheelchair today   Eyes:      Extraocular Movements: Extraocular movements intact.      Conjunctiva/sclera: Conjunctivae normal.   Cardiovascular:      Rate and Rhythm: Regular rhythm. Tachycardia present.      Heart sounds: Normal heart sounds. No murmur heard.  Pulmonary:      Effort: Pulmonary effort is normal. No respiratory distress.      Breath sounds: Normal breath sounds. No wheezing.   Musculoskeletal:         General: Tenderness present.      Comments: Muscle spasm noted left paraspinal musculature with swelling without discoloration   Skin:     General: Skin is warm and dry.      Findings: No rash.   Neurological:      Mental Status: She is alert and oriented to person, place, and time.   Psychiatric:         Mood and " Affect: Mood normal.         Behavior: Behavior normal.         Thought Content: Thought content normal.         The following data was reviewed by: TWAN Almanza on 03/09/2023:  CMP    CMP 7/9/22 9/27/22 11/29/22   Glucose 117 (A) 100 (A) 98   BUN 12 6 8   Creatinine 0.77 0.56 (A) 0.64   eGFR 91.8 108.6 104.5   Sodium 133 (A) 140 140   Potassium 4.3 4.0 4.2   Chloride 97 (A) 104 102   Calcium 9.2 8.9 9.7   Total Protein 7.6 5.5 (A) 7.1   Albumin 4.29 3.30 (A) 4.20   Globulin 3.3 2.2 2.9   Total Bilirubin 0.2 <0.2 <0.2   Alkaline Phosphatase 128 (A) 73 91   AST (SGOT) 62 (A) 17 20   ALT (SGPT) 33 16 11   Albumin/Globulin Ratio 1.3 1.5 1.4   BUN/Creatinine Ratio 15.6 10.7 12.5   Anion Gap 12.3 9.3 12.0   (A) Abnormal value       Comments are available for some flowsheets but are not being displayed.           CBC w/diff    CBC w/Diff 7/9/22 9/27/22 11/29/22   WBC 5.62 5.58 9.30   RBC 3.97 2.99 (A) 4.32   Hemoglobin 14.5 9.7 (A) 13.5   Hematocrit 42.2 30.0 (A) 41.3   .3 (A) 100.3 (A) 95.6   MCH 36.5 (A) 32.4 31.3   MCHC 34.4 32.3 32.7   RDW 11.9 (A) 15.3 14.5   Platelets 193 282 362   Neutrophil Rel % 52.6 64.7 64.5   Immature Granulocyte Rel % 0.2 0.2 0.3   Lymphocyte Rel % 34.9 17.9 (A) 23.3   Monocyte Rel % 9.8 13.6 (A) 9.6   Eosinophil Rel % 1.4 2.7 1.4   Basophil Rel % 1.1 0.9 0.9   (A) Abnormal value            Lipid Panel    Lipid Panel 11/29/22   Total Cholesterol 222 (A)   Triglycerides 240 (A)   HDL Cholesterol 45   VLDL Cholesterol 43 (A)   LDL Cholesterol  134 (A)   LDL/HDL Ratio 2.87   (A) Abnormal value                           Assessment & Plan     Diagnoses and all orders for this visit:    1. Chronic left-sided low back pain with left-sided sciatica (Primary)  Comments:  Increase Zanaflex 6 mg as needed  Start ibuprofen as needed  Advised conservative measures including stretching and hot bath soaks  Orders:  -     ibuprofen (ADVIL,MOTRIN) 800 MG tablet; Take 1 tablet by mouth 3  (Three) Times a Day As Needed for Mild Pain or Moderate Pain.  Dispense: 90 tablet; Refill: 5  -     ketorolac (TORADOL) injection 60 mg  -     methylPREDNISolone acetate (DEPO-medrol) injection 80 mg    2. Muscle spasm  Comments:  Increase Zanaflex 6 mg daily  Orders:  -     TiZANidine (Zanaflex) 6 MG capsule; Take 1 capsule by mouth 2 (Two) Times a Day As Needed for Muscle Spasms.  Dispense: 60 capsule; Refill: 1    3. Mixed hyperlipidemia  Comments:  Continue rosuvastatin  Advised low-cholesterol diet    4. Recurrent major depressive disorder, in full remission (HCC)  Comments:  Continue buspirone  Advised conservative measures for dealing with depression and stress    5. Gastroesophageal reflux disease without esophagitis  Comments:  Continue pantoprazole  Advised to avoid known trigger foods            Patient was given instructions and counseling regarding his condition or for health maintenance advice. Please see specific information pulled into the AVS if appropriate      This document has been electronically signed by:  Mary Egan PA-C

## 2023-03-27 ENCOUNTER — TELEPHONE (OUTPATIENT)
Dept: FAMILY MEDICINE CLINIC | Facility: CLINIC | Age: 58
End: 2023-03-27

## 2023-03-27 NOTE — TELEPHONE ENCOUNTER
Caller: Ailyn Samano    Relationship to patient: Self    Best call back number: [phone]    Chief complaint: PATIENT IS UNABLE TO MAKE IT TO THE APPOINTMENT TODAY    Type of visit: IN OFFICE PROCEDURE    Requested date: ANY DAY AFTER 3/30   PATIENT PREFERS EVENING     If rescheduling, when is the original appointment:3/27 AT 3:15    PLEASE CALL BACK PATIENT FOR SCHEDULING

## 2023-03-30 ENCOUNTER — OFFICE VISIT (OUTPATIENT)
Dept: NEUROSURGERY | Facility: CLINIC | Age: 58
End: 2023-03-30
Payer: COMMERCIAL

## 2023-03-30 VITALS
TEMPERATURE: 97.4 F | SYSTOLIC BLOOD PRESSURE: 130 MMHG | HEIGHT: 70 IN | DIASTOLIC BLOOD PRESSURE: 80 MMHG | BODY MASS INDEX: 24.94 KG/M2 | HEART RATE: 90 BPM | WEIGHT: 174.2 LBS | OXYGEN SATURATION: 99 %

## 2023-03-30 DIAGNOSIS — M48.062 SPINAL STENOSIS OF LUMBAR REGION WITH NEUROGENIC CLAUDICATION: Primary | ICD-10-CM

## 2023-03-30 RX ORDER — OXYCODONE HCL 10 MG/1
10 TABLET, FILM COATED, EXTENDED RELEASE ORAL ONCE
OUTPATIENT
Start: 2023-03-30 | End: 2023-03-30

## 2023-03-30 RX ORDER — CHLORHEXIDINE GLUCONATE 4 G/100ML
SOLUTION TOPICAL
Qty: 120 ML | Refills: 0 | Status: SHIPPED | OUTPATIENT
Start: 2023-03-30

## 2023-03-30 RX ORDER — FAMOTIDINE 20 MG/1
20 TABLET, FILM COATED ORAL
OUTPATIENT
Start: 2023-03-30

## 2023-03-30 RX ORDER — IBUPROFEN 800 MG/1
800 TABLET ORAL ONCE
OUTPATIENT
Start: 2023-03-30 | End: 2023-03-30

## 2023-03-30 RX ORDER — ACETAMINOPHEN 500 MG
1000 TABLET ORAL ONCE
OUTPATIENT
Start: 2023-03-30 | End: 2023-03-30
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
## 2023-04-03 DIAGNOSIS — M62.838 MUSCLE SPASM: Chronic | ICD-10-CM

## 2023-04-03 DIAGNOSIS — K26.9 MULTIPLE DUODENAL ULCERS: Chronic | ICD-10-CM

## 2023-04-03 DIAGNOSIS — F51.04 PSYCHOPHYSIOLOGICAL INSOMNIA: ICD-10-CM

## 2023-04-03 DIAGNOSIS — F39 UNSPECIFIED MOOD (AFFECTIVE) DISORDER: ICD-10-CM

## 2023-04-03 DIAGNOSIS — F41.9 ANXIETY DISORDER, UNSPECIFIED TYPE: ICD-10-CM

## 2023-04-03 RX ORDER — DOXEPIN HYDROCHLORIDE 10 MG/1
10 CAPSULE ORAL NIGHTLY
Qty: 30 CAPSULE | Refills: 0 | Status: SHIPPED | OUTPATIENT
Start: 2023-04-03

## 2023-04-03 RX ORDER — TIZANIDINE 4 MG/1
TABLET ORAL
Qty: 30 TABLET | Refills: 2 | Status: SHIPPED | OUTPATIENT
Start: 2023-04-03

## 2023-04-03 RX ORDER — BUSPIRONE HYDROCHLORIDE 10 MG/1
10 TABLET ORAL 2 TIMES DAILY PRN
Qty: 30 TABLET | Refills: 0 | Status: SHIPPED | OUTPATIENT
Start: 2023-04-03

## 2023-04-03 RX ORDER — SUCRALFATE 1 G/1
TABLET ORAL
Qty: 120 TABLET | Refills: 1 | Status: SHIPPED | OUTPATIENT
Start: 2023-04-03

## 2023-04-04 ENCOUNTER — PROCEDURE VISIT (OUTPATIENT)
Dept: FAMILY MEDICINE CLINIC | Facility: CLINIC | Age: 58
End: 2023-04-04
Payer: COMMERCIAL

## 2023-04-04 VITALS
SYSTOLIC BLOOD PRESSURE: 115 MMHG | BODY MASS INDEX: 25 KG/M2 | HEIGHT: 70 IN | DIASTOLIC BLOOD PRESSURE: 80 MMHG | TEMPERATURE: 98.4 F | OXYGEN SATURATION: 97 % | HEART RATE: 103 BPM

## 2023-04-04 DIAGNOSIS — K21.9 GASTROESOPHAGEAL REFLUX DISEASE WITHOUT ESOPHAGITIS: Chronic | ICD-10-CM

## 2023-04-04 DIAGNOSIS — Z12.31 ENCOUNTER FOR SCREENING MAMMOGRAM FOR MALIGNANT NEOPLASM OF BREAST: ICD-10-CM

## 2023-04-04 DIAGNOSIS — Z12.11 COLON CANCER SCREENING: ICD-10-CM

## 2023-04-04 DIAGNOSIS — E78.2 MIXED HYPERLIPIDEMIA: Chronic | ICD-10-CM

## 2023-04-04 DIAGNOSIS — F41.1 GAD (GENERALIZED ANXIETY DISORDER): Primary | Chronic | ICD-10-CM

## 2023-04-04 PROCEDURE — 99214 OFFICE O/P EST MOD 30 MIN: CPT | Performed by: PHYSICIAN ASSISTANT

## 2023-04-04 RX ORDER — ESCITALOPRAM OXALATE 10 MG/1
10 TABLET ORAL DAILY
Qty: 30 TABLET | Refills: 5 | Status: SHIPPED | OUTPATIENT
Start: 2023-04-04

## 2023-04-04 RX ORDER — AMITRIPTYLINE HYDROCHLORIDE 50 MG/1
50 TABLET, FILM COATED ORAL
COMMUNITY
Start: 2023-03-14

## 2023-04-04 NOTE — PROGRESS NOTES
"Heather Samano is a 57 y.o. female.       Chief Complaint -anxiety    History of Present Illness -    ROS    Anxiety-  Patient complains of increased anxiety nervousness and feelings of being overwhelmed.  She denies any SI HI or hallucinations.    Hyperlipidemia-  Stable with rosuvastatin and low-cholesterol diet    Gastroesophageal reflux disease-stable with pantoprazole and dietary modification    The following portions of the patient's history were reviewed and updated as appropriate: allergies, current medications, past family history, past medical history, past social history, past surgical history and problem list.    Review of Systems    Objective  Vital signs:  /80   Pulse 103   Temp 98.4 °F (36.9 °C) (Temporal)   Ht 177.8 cm (70\")   SpO2 97%   BMI 25.00 kg/m²     Physical Exam  Vitals and nursing note reviewed.   Constitutional:       Appearance: Normal appearance. She is well-developed.   Eyes:      Extraocular Movements: Extraocular movements intact.      Conjunctiva/sclera: Conjunctivae normal.   Cardiovascular:      Rate and Rhythm: Normal rate and regular rhythm.      Heart sounds: Normal heart sounds. No murmur heard.  Pulmonary:      Effort: Pulmonary effort is normal. No respiratory distress.      Breath sounds: Normal breath sounds. No wheezing.   Musculoskeletal:         General: No tenderness.   Skin:     General: Skin is warm and dry.      Findings: No rash.   Neurological:      Mental Status: She is alert and oriented to person, place, and time.   Psychiatric:         Mood and Affect: Mood normal.         Behavior: Behavior normal.         Thought Content: Thought content normal.         The following data was reviewed by: TWAN Amlanza on 04/04/2023:  CMP    CMP 7/9/22 9/27/22 11/29/22   Glucose 117 (A) 100 (A) 98   BUN 12 6 8   Creatinine 0.77 0.56 (A) 0.64   eGFR 91.8 108.6 104.5   Sodium 133 (A) 140 140   Potassium 4.3 4.0 4.2   Chloride 97 (A) 104 102 "   Calcium 9.2 8.9 9.7   Total Protein 7.6 5.5 (A) 7.1   Albumin 4.29 3.30 (A) 4.20   Globulin 3.3 2.2 2.9   Total Bilirubin 0.2 <0.2 <0.2   Alkaline Phosphatase 128 (A) 73 91   AST (SGOT) 62 (A) 17 20   ALT (SGPT) 33 16 11   Albumin/Globulin Ratio 1.3 1.5 1.4   BUN/Creatinine Ratio 15.6 10.7 12.5   Anion Gap 12.3 9.3 12.0   (A) Abnormal value       Comments are available for some flowsheets but are not being displayed.           CBC w/diff    CBC w/Diff 7/9/22 9/27/22 11/29/22   WBC 5.62 5.58 9.30   RBC 3.97 2.99 (A) 4.32   Hemoglobin 14.5 9.7 (A) 13.5   Hematocrit 42.2 30.0 (A) 41.3   .3 (A) 100.3 (A) 95.6   MCH 36.5 (A) 32.4 31.3   MCHC 34.4 32.3 32.7   RDW 11.9 (A) 15.3 14.5   Platelets 193 282 362   Neutrophil Rel % 52.6 64.7 64.5   Immature Granulocyte Rel % 0.2 0.2 0.3   Lymphocyte Rel % 34.9 17.9 (A) 23.3   Monocyte Rel % 9.8 13.6 (A) 9.6   Eosinophil Rel % 1.4 2.7 1.4   Basophil Rel % 1.1 0.9 0.9   (A) Abnormal value            Lipid Panel    Lipid Panel 11/29/22   Total Cholesterol 222 (A)   Triglycerides 240 (A)   HDL Cholesterol 45   VLDL Cholesterol 43 (A)   LDL Cholesterol  134 (A)   LDL/HDL Ratio 2.87   (A) Abnormal value                           Assessment & Plan     Diagnoses and all orders for this visit:    1. MICHAEL (generalized anxiety disorder) (Primary)  Comments:  Start Lexapro  Continue amitriptyline at this time as she is having good quality sleep  Discussed potential drug drug interaction with patient   Orders:  -     escitalopram (Lexapro) 10 MG tablet; Take 1 tablet by mouth Daily.  Dispense: 30 tablet; Refill: 5    2. Encounter for screening mammogram for malignant neoplasm of breast  -     Mammo Screening Digital Tomosynthesis Bilateral With CAD; Future    3. Colon cancer screening  -     Cologuard - Stool, Per Rectum; Future    4. Mixed hyperlipidemia  Comments:  Continue rosuvastatin  Advised low-cholesterol diet    5. Gastroesophageal reflux disease without  esophagitis  Comments:  Continue pantoprazole  Advised to avoid known trigger foods            Patient was given instructions and counseling regarding his condition or for health maintenance advice. Please see specific information pulled into the AVS if appropriate      This document has been electronically signed by:  Mary Egan PA-C

## 2023-04-13 DIAGNOSIS — M48.062 SPINAL STENOSIS OF LUMBAR REGION WITH NEUROGENIC CLAUDICATION: Primary | ICD-10-CM

## 2023-04-30 DIAGNOSIS — F41.9 ANXIETY DISORDER, UNSPECIFIED TYPE: ICD-10-CM

## 2023-05-01 DIAGNOSIS — J41.8 MIXED SIMPLE AND MUCOPURULENT CHRONIC BRONCHITIS: Chronic | ICD-10-CM

## 2023-05-01 RX ORDER — ALBUTEROL SULFATE 90 UG/1
AEROSOL, METERED RESPIRATORY (INHALATION)
Qty: 18 G | Refills: 5 | Status: SHIPPED | OUTPATIENT
Start: 2023-05-01

## 2023-05-01 RX ORDER — BUSPIRONE HYDROCHLORIDE 10 MG/1
TABLET ORAL
Qty: 60 TABLET | Refills: 5 | Status: SHIPPED | OUTPATIENT
Start: 2023-05-01

## 2023-05-29 DIAGNOSIS — F51.04 PSYCHOPHYSIOLOGIC INSOMNIA: ICD-10-CM

## 2023-05-29 DIAGNOSIS — K26.9 MULTIPLE DUODENAL ULCERS: Chronic | ICD-10-CM

## 2023-05-29 DIAGNOSIS — E78.2 MIXED HYPERLIPIDEMIA: Chronic | ICD-10-CM

## 2023-05-30 RX ORDER — PANTOPRAZOLE SODIUM 40 MG/1
TABLET, DELAYED RELEASE ORAL
Qty: 30 TABLET | Refills: 5 | Status: SHIPPED | OUTPATIENT
Start: 2023-05-30

## 2023-05-30 RX ORDER — AMITRIPTYLINE HYDROCHLORIDE 50 MG/1
TABLET, FILM COATED ORAL
Qty: 30 TABLET | Refills: 5 | Status: SHIPPED | OUTPATIENT
Start: 2023-05-30

## 2023-05-30 RX ORDER — SUCRALFATE 1 G/1
TABLET ORAL
Qty: 120 TABLET | Refills: 1 | Status: SHIPPED | OUTPATIENT
Start: 2023-05-30

## 2023-05-30 RX ORDER — ROSUVASTATIN CALCIUM 10 MG/1
TABLET, COATED ORAL
Qty: 30 TABLET | Refills: 5 | Status: SHIPPED | OUTPATIENT
Start: 2023-05-30

## 2023-07-20 ENCOUNTER — TELEPHONE (OUTPATIENT)
Dept: FAMILY MEDICINE CLINIC | Facility: CLINIC | Age: 58
End: 2023-07-20
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
## 2023-08-02 ENCOUNTER — OFFICE VISIT (OUTPATIENT)
Dept: FAMILY MEDICINE CLINIC | Facility: CLINIC | Age: 58
End: 2023-08-02
Payer: COMMERCIAL

## 2023-08-02 VITALS
OXYGEN SATURATION: 92 % | DIASTOLIC BLOOD PRESSURE: 65 MMHG | HEIGHT: 70 IN | SYSTOLIC BLOOD PRESSURE: 100 MMHG | HEART RATE: 107 BPM | TEMPERATURE: 97.1 F | WEIGHT: 164.8 LBS | BODY MASS INDEX: 23.59 KG/M2

## 2023-08-02 DIAGNOSIS — L02.91 ABSCESS: Primary | ICD-10-CM

## 2023-08-02 PROCEDURE — 87186 SC STD MICRODIL/AGAR DIL: CPT | Performed by: PHYSICIAN ASSISTANT

## 2023-08-02 PROCEDURE — 87147 CULTURE TYPE IMMUNOLOGIC: CPT | Performed by: PHYSICIAN ASSISTANT

## 2023-08-02 PROCEDURE — 87205 SMEAR GRAM STAIN: CPT | Performed by: PHYSICIAN ASSISTANT

## 2023-08-02 PROCEDURE — 87070 CULTURE OTHR SPECIMN AEROBIC: CPT | Performed by: PHYSICIAN ASSISTANT

## 2023-08-02 RX ORDER — LIDOCAINE HYDROCHLORIDE AND EPINEPHRINE 10; 10 MG/ML; UG/ML
1.5 INJECTION, SOLUTION INFILTRATION; PERINEURAL ONCE
Status: COMPLETED | OUTPATIENT
Start: 2023-08-02 | End: 2023-08-02

## 2023-08-02 RX ORDER — SULFAMETHOXAZOLE AND TRIMETHOPRIM 800; 160 MG/1; MG/1
1 TABLET ORAL 2 TIMES DAILY
Qty: 14 TABLET | Refills: 0 | Status: SHIPPED | OUTPATIENT
Start: 2023-08-02 | End: 2023-08-09

## 2023-08-02 RX ADMIN — LIDOCAINE HYDROCHLORIDE AND EPINEPHRINE 1.5 ML: 10; 10 INJECTION, SOLUTION INFILTRATION; PERINEURAL at 09:42

## 2023-08-03 ENCOUNTER — CLINICAL SUPPORT (OUTPATIENT)
Dept: FAMILY MEDICINE CLINIC | Facility: CLINIC | Age: 58
End: 2023-08-03
Payer: COMMERCIAL

## 2023-08-03 ENCOUNTER — TELEPHONE (OUTPATIENT)
Dept: FAMILY MEDICINE CLINIC | Facility: CLINIC | Age: 58
End: 2023-08-03
Payer: COMMERCIAL

## 2023-08-03 DIAGNOSIS — Z23 ENCOUNTER FOR IMMUNIZATION: Primary | ICD-10-CM

## 2023-08-03 PROCEDURE — 90715 TDAP VACCINE 7 YRS/> IM: CPT | Performed by: PHYSICIAN ASSISTANT

## 2023-08-03 PROCEDURE — 90471 IMMUNIZATION ADMIN: CPT | Performed by: PHYSICIAN ASSISTANT

## 2023-08-04 LAB
BACTERIA SPEC AEROBE CULT: ABNORMAL
GRAM STN SPEC: ABNORMAL

## 2023-08-16 DIAGNOSIS — K26.9 MULTIPLE DUODENAL ULCERS: Chronic | ICD-10-CM

## 2023-08-17 RX ORDER — SUCRALFATE 1 G/1
TABLET ORAL
Qty: 120 TABLET | Refills: 1 | Status: SHIPPED | OUTPATIENT
Start: 2023-08-17

## 2023-09-12 DIAGNOSIS — M54.42 CHRONIC LEFT-SIDED LOW BACK PAIN WITH LEFT-SIDED SCIATICA: ICD-10-CM

## 2023-09-12 DIAGNOSIS — G89.29 CHRONIC LEFT-SIDED LOW BACK PAIN WITH LEFT-SIDED SCIATICA: ICD-10-CM

## 2023-09-12 RX ORDER — IBUPROFEN 800 MG/1
TABLET ORAL
Qty: 90 TABLET | Refills: 5 | Status: SHIPPED | OUTPATIENT
Start: 2023-09-12

## 2023-09-25 DIAGNOSIS — J41.8 MIXED SIMPLE AND MUCOPURULENT CHRONIC BRONCHITIS: Chronic | ICD-10-CM

## 2023-09-25 RX ORDER — ALBUTEROL SULFATE 90 UG/1
AEROSOL, METERED RESPIRATORY (INHALATION)
Qty: 18 G | Refills: 5 | Status: SHIPPED | OUTPATIENT
Start: 2023-09-25

## 2023-10-08 DIAGNOSIS — F41.1 GAD (GENERALIZED ANXIETY DISORDER): Chronic | ICD-10-CM

## 2023-10-08 DIAGNOSIS — M62.838 MUSCLE SPASM: Chronic | ICD-10-CM

## 2023-10-08 DIAGNOSIS — K26.9 MULTIPLE DUODENAL ULCERS: Chronic | ICD-10-CM

## 2023-10-09 RX ORDER — ESCITALOPRAM OXALATE 10 MG/1
10 TABLET ORAL DAILY
Qty: 30 TABLET | Refills: 5 | Status: SHIPPED | OUTPATIENT
Start: 2023-10-09

## 2023-10-09 RX ORDER — TIZANIDINE 4 MG/1
TABLET ORAL
Qty: 30 TABLET | Refills: 2 | Status: SHIPPED | OUTPATIENT
Start: 2023-10-09

## 2023-10-09 RX ORDER — SUCRALFATE 1 G/1
TABLET ORAL
Qty: 120 TABLET | Refills: 1 | Status: SHIPPED | OUTPATIENT
Start: 2023-10-09

## 2023-11-12 DIAGNOSIS — F41.9 ANXIETY DISORDER, UNSPECIFIED TYPE: ICD-10-CM

## 2023-11-13 RX ORDER — BUSPIRONE HYDROCHLORIDE 10 MG/1
10 TABLET ORAL 2 TIMES DAILY PRN
Qty: 60 TABLET | Refills: 5 | Status: SHIPPED | OUTPATIENT
Start: 2023-11-13

## 2023-12-11 DIAGNOSIS — F51.04 PSYCHOPHYSIOLOGIC INSOMNIA: ICD-10-CM

## 2023-12-11 RX ORDER — AMITRIPTYLINE HYDROCHLORIDE 50 MG/1
TABLET, FILM COATED ORAL
Qty: 30 TABLET | Refills: 5 | Status: SHIPPED | OUTPATIENT
Start: 2023-12-11

## 2023-12-26 DIAGNOSIS — F51.04 PSYCHOPHYSIOLOGICAL INSOMNIA: ICD-10-CM

## 2023-12-26 DIAGNOSIS — F39 UNSPECIFIED MOOD (AFFECTIVE) DISORDER: ICD-10-CM

## 2023-12-26 DIAGNOSIS — F41.9 ANXIETY DISORDER, UNSPECIFIED TYPE: ICD-10-CM

## 2023-12-26 RX ORDER — DOXEPIN HYDROCHLORIDE 10 MG/1
10 CAPSULE ORAL NIGHTLY
Qty: 30 CAPSULE | Refills: 0 | Status: SHIPPED | OUTPATIENT
Start: 2023-12-26

## 2023-12-26 NOTE — TELEPHONE ENCOUNTER
Caller: SamanoAilyn    Relationship: Self    Best call back number: [phone]     Requested Prescriptions:   Requested Prescriptions     Pending Prescriptions Disp Refills    doxepin (SINEquan) 10 MG capsule 30 capsule 0     Sig: Take 1 capsule by mouth Every Night.        Pharmacy where request should be sent: [facility] [place], [place] - [address] [place] [address] - [phone]  - [phone] FX     Last office visit with prescribing clinician: 3/9/2023   Last telemedicine visit with prescribing clinician: Visit date not found   Next office visit with prescribing clinician: Visit date not found     Additional details provided by patient: NO DOSES LEFT    Does the patient have less than a 3 day supply:  [x] Yes  [] No    Would you like a call back once the refill request has been completed: [] Yes [x] No    If the office needs to give you a call back, can they leave a voicemail: [] Yes [x] No    Yanni Tracy, PCT   12/26/23 08:46 EST

## 2024-01-03 DIAGNOSIS — M62.838 MUSCLE SPASM: Chronic | ICD-10-CM

## 2024-01-03 DIAGNOSIS — E78.2 MIXED HYPERLIPIDEMIA: Chronic | ICD-10-CM

## 2024-01-04 RX ORDER — ROSUVASTATIN CALCIUM 10 MG/1
TABLET, COATED ORAL
Qty: 30 TABLET | Refills: 5 | Status: SHIPPED | OUTPATIENT
Start: 2024-01-04

## 2024-01-04 RX ORDER — TIZANIDINE 4 MG/1
TABLET ORAL
Qty: 30 TABLET | Refills: 2 | Status: SHIPPED | OUTPATIENT
Start: 2024-01-04

## 2024-02-19 DIAGNOSIS — J41.8 MIXED SIMPLE AND MUCOPURULENT CHRONIC BRONCHITIS: Chronic | ICD-10-CM

## 2024-02-19 RX ORDER — ALBUTEROL SULFATE 90 UG/1
AEROSOL, METERED RESPIRATORY (INHALATION)
Qty: 18 G | Refills: 5 | Status: SHIPPED | OUTPATIENT
Start: 2024-02-19

## 2024-04-01 DIAGNOSIS — G89.29 CHRONIC LEFT-SIDED LOW BACK PAIN WITH LEFT-SIDED SCIATICA: ICD-10-CM

## 2024-04-01 DIAGNOSIS — M62.838 MUSCLE SPASM: Chronic | ICD-10-CM

## 2024-04-01 DIAGNOSIS — F41.1 GAD (GENERALIZED ANXIETY DISORDER): Chronic | ICD-10-CM

## 2024-04-01 DIAGNOSIS — M54.42 CHRONIC LEFT-SIDED LOW BACK PAIN WITH LEFT-SIDED SCIATICA: ICD-10-CM

## 2024-04-01 RX ORDER — TIZANIDINE 4 MG/1
TABLET ORAL
Qty: 30 TABLET | Refills: 2 | OUTPATIENT
Start: 2024-04-01

## 2024-04-01 RX ORDER — IBUPROFEN 800 MG/1
TABLET ORAL
Qty: 90 TABLET | Refills: 5 | OUTPATIENT
Start: 2024-04-01

## 2024-04-01 RX ORDER — ESCITALOPRAM OXALATE 10 MG/1
10 TABLET ORAL DAILY
Qty: 30 TABLET | Refills: 5 | OUTPATIENT
Start: 2024-04-01

## 2024-04-01 NOTE — TELEPHONE ENCOUNTER
RELAY; no voicemail but spoke to her sister said she will have her call office for updated appointment for refills with Mary

## 2024-04-09 DIAGNOSIS — G89.29 CHRONIC LEFT-SIDED LOW BACK PAIN WITH LEFT-SIDED SCIATICA: ICD-10-CM

## 2024-04-09 DIAGNOSIS — F41.1 GAD (GENERALIZED ANXIETY DISORDER): Chronic | ICD-10-CM

## 2024-04-09 DIAGNOSIS — M62.838 MUSCLE SPASM: Chronic | ICD-10-CM

## 2024-04-09 DIAGNOSIS — M54.42 CHRONIC LEFT-SIDED LOW BACK PAIN WITH LEFT-SIDED SCIATICA: ICD-10-CM

## 2024-04-09 RX ORDER — ESCITALOPRAM OXALATE 10 MG/1
10 TABLET ORAL DAILY
Qty: 30 TABLET | Refills: 0 | Status: SHIPPED | OUTPATIENT
Start: 2024-04-09

## 2024-04-09 RX ORDER — IBUPROFEN 800 MG/1
TABLET ORAL
Qty: 90 TABLET | Refills: 0 | Status: SHIPPED | OUTPATIENT
Start: 2024-04-09

## 2024-04-09 RX ORDER — TIZANIDINE 4 MG/1
TABLET ORAL
Qty: 30 TABLET | Refills: 0 | Status: SHIPPED | OUTPATIENT
Start: 2024-04-09

## 2024-07-29 ENCOUNTER — OFFICE VISIT (OUTPATIENT)
Dept: FAMILY MEDICINE CLINIC | Facility: CLINIC | Age: 59
End: 2024-07-29
Payer: COMMERCIAL

## 2024-07-29 VITALS
HEART RATE: 88 BPM | BODY MASS INDEX: 27.77 KG/M2 | SYSTOLIC BLOOD PRESSURE: 114 MMHG | DIASTOLIC BLOOD PRESSURE: 80 MMHG | OXYGEN SATURATION: 98 % | TEMPERATURE: 97.9 F | HEIGHT: 70 IN | WEIGHT: 194 LBS

## 2024-07-29 DIAGNOSIS — F51.04 PSYCHOPHYSIOLOGICAL INSOMNIA: Chronic | ICD-10-CM

## 2024-07-29 DIAGNOSIS — F41.1 GENERALIZED ANXIETY DISORDER: ICD-10-CM

## 2024-07-29 DIAGNOSIS — M48.062 SPINAL STENOSIS OF LUMBAR REGION WITH NEUROGENIC CLAUDICATION: Chronic | ICD-10-CM

## 2024-07-29 DIAGNOSIS — R10.11 CHRONIC RUQ PAIN: Chronic | ICD-10-CM

## 2024-07-29 DIAGNOSIS — Z78.0 MENOPAUSE: ICD-10-CM

## 2024-07-29 DIAGNOSIS — Z12.31 ENCOUNTER FOR SCREENING MAMMOGRAM FOR MALIGNANT NEOPLASM OF BREAST: ICD-10-CM

## 2024-07-29 DIAGNOSIS — G89.29 CHRONIC RUQ PAIN: Chronic | ICD-10-CM

## 2024-07-29 DIAGNOSIS — F39 UNSPECIFIED MOOD (AFFECTIVE) DISORDER: Chronic | ICD-10-CM

## 2024-07-29 DIAGNOSIS — E78.2 MIXED HYPERLIPIDEMIA: Chronic | ICD-10-CM

## 2024-07-29 DIAGNOSIS — F41.1 GAD (GENERALIZED ANXIETY DISORDER): Chronic | ICD-10-CM

## 2024-07-29 DIAGNOSIS — I10 PRIMARY HYPERTENSION: ICD-10-CM

## 2024-07-29 DIAGNOSIS — K26.9 MULTIPLE DUODENAL ULCERS: Chronic | ICD-10-CM

## 2024-07-29 DIAGNOSIS — L40.9 PSORIASIS: Primary | Chronic | ICD-10-CM

## 2024-07-29 DIAGNOSIS — F10.20 ALCOHOLISM: Chronic | ICD-10-CM

## 2024-07-29 PROCEDURE — 83540 ASSAY OF IRON: CPT | Performed by: PHYSICIAN ASSISTANT

## 2024-07-29 PROCEDURE — 85014 HEMATOCRIT: CPT | Performed by: PHYSICIAN ASSISTANT

## 2024-07-29 PROCEDURE — 83921 ORGANIC ACID SINGLE QUANT: CPT | Performed by: PHYSICIAN ASSISTANT

## 2024-07-29 PROCEDURE — 80053 COMPREHEN METABOLIC PANEL: CPT | Performed by: PHYSICIAN ASSISTANT

## 2024-07-29 PROCEDURE — 99214 OFFICE O/P EST MOD 30 MIN: CPT | Performed by: PHYSICIAN ASSISTANT

## 2024-07-29 PROCEDURE — 84443 ASSAY THYROID STIM HORMONE: CPT | Performed by: PHYSICIAN ASSISTANT

## 2024-07-29 PROCEDURE — 85025 COMPLETE CBC W/AUTO DIFF WBC: CPT | Performed by: PHYSICIAN ASSISTANT

## 2024-07-29 PROCEDURE — 3074F SYST BP LT 130 MM HG: CPT | Performed by: PHYSICIAN ASSISTANT

## 2024-07-29 PROCEDURE — 1159F MED LIST DOCD IN RCRD: CPT | Performed by: PHYSICIAN ASSISTANT

## 2024-07-29 PROCEDURE — 80061 LIPID PANEL: CPT | Performed by: PHYSICIAN ASSISTANT

## 2024-07-29 PROCEDURE — 84466 ASSAY OF TRANSFERRIN: CPT | Performed by: PHYSICIAN ASSISTANT

## 2024-07-29 PROCEDURE — 3079F DIAST BP 80-89 MM HG: CPT | Performed by: PHYSICIAN ASSISTANT

## 2024-07-29 PROCEDURE — 1160F RVW MEDS BY RX/DR IN RCRD: CPT | Performed by: PHYSICIAN ASSISTANT

## 2024-07-29 PROCEDURE — 82747 ASSAY OF FOLIC ACID RBC: CPT | Performed by: PHYSICIAN ASSISTANT

## 2024-07-29 RX ORDER — ROSUVASTATIN CALCIUM 10 MG/1
10 TABLET, COATED ORAL DAILY
Qty: 30 TABLET | Refills: 5 | Status: SHIPPED | OUTPATIENT
Start: 2024-07-29

## 2024-07-29 RX ORDER — TIZANIDINE HYDROCHLORIDE 6 MG/1
6 CAPSULE, GELATIN COATED ORAL NIGHTLY PRN
Qty: 30 CAPSULE | Refills: 5 | Status: SHIPPED | OUTPATIENT
Start: 2024-07-29

## 2024-07-29 RX ORDER — TRIAMCINOLONE ACETONIDE 5 MG/G
1 CREAM TOPICAL 3 TIMES DAILY
Qty: 60 G | Refills: 3 | Status: SHIPPED | OUTPATIENT
Start: 2024-07-29

## 2024-07-29 RX ORDER — PANTOPRAZOLE SODIUM 40 MG/1
40 TABLET, DELAYED RELEASE ORAL DAILY
Qty: 30 TABLET | Refills: 5 | Status: SHIPPED | OUTPATIENT
Start: 2024-07-29

## 2024-07-29 RX ORDER — DOXEPIN HYDROCHLORIDE 10 MG/1
10 CAPSULE ORAL NIGHTLY
Qty: 30 CAPSULE | Refills: 5 | Status: SHIPPED | OUTPATIENT
Start: 2024-07-29

## 2024-07-29 RX ORDER — ESCITALOPRAM OXALATE 10 MG/1
10 TABLET ORAL DAILY
Qty: 30 TABLET | Refills: 5 | Status: SHIPPED | OUTPATIENT
Start: 2024-07-29

## 2024-07-29 NOTE — PROGRESS NOTES
"Chief Complaint -psoriasis    History of Present Illness -     Ailyn Samano is a 58 y.o. female.     Her friend Tayla Hawley is here with her today helping with the history and providing transportation.    Psoriasis-  Not at goal as patient complains of psoriatic rash on her left leg, right knee and left elbow.  Minimal relief with over-the-counter ibuprofen    Abdominal pain-  Patient complains of a mild dull pain when she bends over with the protrusion in the right upper quadrant of the abdomen.  Onset 6 months.  Patient does drink alcohol regularly.    Alcoholism-  Patient continues to drink alcohol regularly.  She states that she has went to [facility] and had a workup with Dr. Willingham.  She reports that she had an EGD in February that revealed esophageal varices.  She reports having a colonoscopy in March that was unremarkable.    Anxiety-  Patient's affect disorder and anxiety disorder are uncontrolled as she has not been on any medication in approximately 6 months.  She denies any hallucinations, SI or HI    Insomnia-  Uncontrolled as patient complains of trouble sleeping due to anxiety and muscle spasms in her back    PUD-  Patient has history of multiple duodenal ulcers.  Not at goal she has been out of her pantoprazole for several months.    Hyperlipidemia-  Uncontrolled as she has not been taking any medication or following a specific diet    Low back pain-  Not at goal as patient is reporting more muscle spasms that are not at goal with Zanaflex 4 mg at night      The following portions of the patient's history were reviewed and updated as appropriate: allergies, current medications, past family history, past medical history, past social history, past surgical history and problem list.        Objective  Vital signs:  /80   Pulse 88   Temp 97.9 °F (36.6 °C) (Temporal)   Ht 177.8 cm (70\")   Wt 88 kg (194 lb)   SpO2 98%   BMI 27.84 kg/m²     Physical Exam  Vitals and nursing note reviewed. "   Constitutional:       Appearance: Normal appearance. She is well-developed.   Eyes:      Extraocular Movements: Extraocular movements intact.      Conjunctiva/sclera: Conjunctivae normal.   Cardiovascular:      Rate and Rhythm: Normal rate and regular rhythm.      Heart sounds: Normal heart sounds. No murmur heard.  Pulmonary:      Effort: Pulmonary effort is normal. No respiratory distress.      Breath sounds: Normal breath sounds. No wheezing.   Abdominal:      General: Abdomen is flat. Bowel sounds are normal. There is no distension.      Palpations: Abdomen is soft. There is mass (RUQ protrusion noted consistent with hepatomegaly).      Tenderness: There is no abdominal tenderness. There is no guarding.   Musculoskeletal:         General: No tenderness.   Skin:     General: Skin is warm and dry.      Findings: No rash.   Neurological:      Mental Status: She is alert and oriented to person, place, and time.   Psychiatric:         Mood and Affect: Mood normal.         Behavior: Behavior normal.         Thought Content: Thought content normal.         The following data was reviewed by Mary Egan PA-C:         Lab Results   Component Value Date    BUN 8 11/29/2022    CREATININE 0.64 11/29/2022    EGFR 104.5 11/29/2022    ALT 11 11/29/2022    AST 20 11/29/2022    WBC 9.30 11/29/2022    HGB 13.5 11/29/2022    HCT 41.3 11/29/2022     11/29/2022    CHOL 222 (H) 11/29/2022    TRIG 240 (H) 11/29/2022    HDL 45 11/29/2022     (H) 11/29/2022           Assessment & Plan     Diagnoses and all orders for this visit:    1. Psoriasis (Primary)  Comments:  Start triamcinolone cream  Advised patient not to use NSAIDs due to PUD  Orders:  -     triamcinolone (KENALOG) 0.5 % cream; Apply 1 Application topically to the appropriate area as directed 3 (Three) Times a Day.  Dispense: 60 g; Refill: 3  -     Comprehensive Metabolic Panel    2. Chronic RUQ pain  Comments:  Suspect cirrhosis of the liver so ultrasound  liver will be done for further evaluation  Orders:  -     US Liver; Future    3. Alcoholism  Comments:  Strongly advised alcohol cessation  We will request lab work, EGD and colonoscopy done at [facility]    4. Unspecified mood (affective) disorder  Comments:  Start Lexapro  Advised compliance with keeping regular doctors appointments to ensure improved outcomes  Orders:  -     doxepin (SINEquan) 10 MG capsule; Take 1 capsule by mouth Every Night.  Dispense: 30 capsule; Refill: 5    5. Generalized anxiety disorder  Comments:  Start Lexapro.  Orders:  -     TSH  -     doxepin (SINEquan) 10 MG capsule; Take 1 capsule by mouth Every Night.  Dispense: 30 capsule; Refill: 5    6. MICHAEL (generalized anxiety disorder)  Comments:  Start Lexapro  Orders:  -     CBC & Differential  -     Comprehensive Metabolic Panel  -     escitalopram (LEXAPRO) 10 MG tablet; Take 1 tablet by mouth Daily.  Dispense: 30 tablet; Refill: 5    7. Psychophysiological insomnia  Comments:  Start doxepin  Advised tips for healthy sleep including avoidance of caffeine  Orders:  -     doxepin (SINEquan) 10 MG capsule; Take 1 capsule by mouth Every Night.  Dispense: 30 capsule; Refill: 5    8. Multiple duodenal ulcers  Comments:  Start pantoprazole  Advised to avoid NSAID use  Orders:  -     pantoprazole (PROTONIX) 40 MG EC tablet; Take 1 tablet by mouth Daily.  Dispense: 30 tablet; Refill: 5    9. Mixed hyperlipidemia  Comments:  Start rosuvastatin  Advised low-cholesterol diet  Orders:  -     CBC & Differential  -     Comprehensive Metabolic Panel  -     Lipid Panel  -     rosuvastatin (CRESTOR) 10 MG tablet; Take 1 tablet by mouth Daily.  Dispense: 30 tablet; Refill: 5    10. Spinal stenosis of lumbar region with neurogenic claudication  Comments:  Increase Zanaflex 6 mg nightly as needed to help with muscle spasms and sleep quality  Orders:  -     TiZANidine (Zanaflex) 6 MG capsule; Take 1 capsule by mouth At Night As Needed for Muscle Spasms.   Dispense: 30 capsule; Refill: 5    11. Primary hypertension  -     CBC & Differential  -     Comprehensive Metabolic Panel  -     Lipid Panel  -     TSH  -     Iron Profile  -     Folate RBC  -     Methylmalonic Acid, Serum    12. Encounter for screening mammogram for malignant neoplasm of breast  -     Mammo Screening Digital Tomosynthesis Bilateral With CAD; Future    13. Menopause  -     DEXA Bone Density Axial; Future        BMI is >= 25 and <30. (Overweight) The following options were offered after discussion;: exercise counseling/recommendations and nutrition counseling/recommendations          Patient was given instructions and counseling regarding his condition or for health maintenance advice. Please see specific information pulled into the AVS if appropriate      This document has been electronically signed by:  Mary Egan PA-C

## 2024-07-29 NOTE — PATIENT INSTRUCTIONS
"Fat and Cholesterol Restricted Eating Plan  Getting too much fat and cholesterol in your diet may cause health problems. Choosing the right foods helps keep your fat and cholesterol at normal levels. This can keep you from getting certain diseases.  Your doctor may recommend an eating plan that includes:  Total fat: ______% or less of total calories a day. This is ______g of fat a day.  Saturated fat: ______% or less of total calories a day. This is ______g of saturated fat a day.  Cholesterol: less than _________mg a day.  Fiber: ______g a day.  What are tips for following this plan?  General tips  Work with your doctor to lose weight if you need to.  Avoid:  Foods with added sugar.  Fried foods.  Foods with trans fat or partially hydrogenated oils. This includes some margarines and baked goods.  If you drink alcohol:  Limit how much you have to:  0-1 drink a day for women who are not pregnant.  0-2 drinks a day for men.  Know how much alcohol is in a drink. In the [place] one drink equals one 12 oz bottle of beer (355 mL), one 5 oz glass of wine (148 mL), or one 1½ oz glass of hard liquor (44 mL).  Reading food labels  Check food labels for:  Trans fats.  Partially hydrogenated oils.  Saturated fat (g) in each serving.  Cholesterol (mg) in each serving.  Fiber (g) in each serving.  Choose foods with healthy fats, such as:  Monounsaturated fats and polyunsaturated fats. These include olive and canola oil, flaxseeds, walnuts, almonds, and seeds.  Omega-3 fats. These are found in certain fish, flaxseed oil, and ground flaxseeds.  Choose grain products that have whole grains. Look for the word \"whole\" as the first word in the ingredient list.  Cooking  Cook foods using low-fat methods. These include baking, boiling, grilling, and broiling.  Eat more home-cooked foods. Eat at restaurants and buffets less often. Eat less fast food.  Avoid cooking using saturated fats, such as butter, cream, palm oil, palm kernel oil, and " coconut oil.  Meal planning    At meals, divide your plate into four equal parts:  Fill one-half of your plate with vegetables, green salads, and fruit.  Fill one-fourth of your plate with whole grains.  Fill one-fourth of your plate with low-fat (lean) protein foods.  Eat fish that is high in omega-3 fats at least two times a week. This includes mackerel, tuna, sardines, and salmon.  Eat foods that are high in fiber, such as whole grains, beans, apples, pears, berries, broccoli, carrots, peas, and barley.  What foods should I eat?  Fruits  All fresh, canned (in natural juice), or frozen fruits.  Vegetables  Fresh or frozen vegetables (raw, steamed, roasted, or grilled). Green salads.  Grains  Whole grains, such as whole wheat or whole grain breads, crackers, cereals, and pasta. Unsweetened oatmeal, bulgur, barley, quinoa, or brown rice. Corn or whole wheat flour tortillas.  Meats and other protein foods  Ground beef (85% or leaner), grass-fed beef, or beef trimmed of fat. Skinless chicken or turkey. Ground chicken or turkey. Pork trimmed of fat. All fish and seafood. Egg whites. Dried beans, peas, or lentils. Unsalted nuts or seeds. Unsalted canned beans. Nut butters without added sugar or oil.  Dairy  Low-fat or nonfat dairy products, such as skim or 1% milk, 2% or reduced-fat cheeses, low-fat and fat-free ricotta or cottage cheese, or plain low-fat and nonfat yogurt.  Fats and oils  Tub margarine without trans fats. Light or reduced-fat mayonnaise and salad dressings. Avocado. Olive, canola, sesame, or safflower oils.  The items listed above may not be a complete list of foods and beverages you can eat. Contact a dietitian for more information.  What foods should I avoid?  Fruits  Canned fruit in heavy syrup. Fruit in cream or butter sauce. Fried fruit.  Vegetables  Vegetables cooked in cheese, cream, or butter sauce. Fried vegetables.  Grains  White bread. White pasta. White rice. Cornbread. Bagels, pastries,  and croissants. Crackers and snack foods that contain trans fat and hydrogenated oils.  Meats and other protein foods  Fatty cuts of meat. Ribs, chicken wings, ramos, sausage, bologna, salami, chitterlings, fatback, hot dogs, bratwurst, and packaged lunch meats. Liver and organ meats. Whole eggs and egg yolks. Chicken and turkey with skin. Fried meat.  Dairy  Whole or 2% milk, cream, half-and-half, and cream cheese. Whole milk cheeses. Whole-fat or sweetened yogurt. Full-fat cheeses. Nondairy creamers and whipped toppings. Processed cheese, cheese spreads, and cheese curds.  Fats and oils  Butter, stick margarine, lard, shortening, ghee, or ramos fat. Coconut, palm kernel, and palm oils.  Beverages  Alcohol. Sugar-sweetened drinks such as sodas, lemonade, and fruit drinks.  Sweets and desserts  Corn syrup, sugars, honey, and molasses. Candy. Jam and jelly. Syrup. Sweetened cereals. Cookies, pies, cakes, donuts, muffins, and ice cream.  The items listed above may not be a complete list of foods and beverages you should avoid. Contact a dietitian for more information.  Summary  Choosing the right foods helps keep your fat and cholesterol at normal levels. This can keep you from getting certain diseases.  At meals, fill one-half of your plate with vegetables, green salads, and fruits.  Eat high fiber foods, like whole grains, beans, apples, pears, berries, carrots, peas, and barley.  Limit added sugar, saturated fats, alcohol, and fried foods.  This information is not intended to replace advice given to you by your health care provider. Make sure you discuss any questions you have with your health care provider.  Document Revised: 04/29/2022 Document Reviewed: 04/29/2022  Elsevier Patient Education © 2024 [employer]

## 2024-07-30 LAB
ALBUMIN SERPL-MCNC: 4 G/DL (ref 3.5–5.2)
ALBUMIN/GLOB SERPL: 1.3 G/DL
ALP SERPL-CCNC: 132 U/L (ref 39–117)
ALT SERPL W P-5'-P-CCNC: 19 U/L (ref 1–33)
ANION GAP SERPL CALCULATED.3IONS-SCNC: 10 MMOL/L (ref 5–15)
AST SERPL-CCNC: 25 U/L (ref 1–32)
BASOPHILS # BLD AUTO: 0.06 10*3/MM3 (ref 0–0.2)
BASOPHILS NFR BLD AUTO: 0.7 % (ref 0–1.5)
BILIRUB SERPL-MCNC: 0.3 MG/DL (ref 0–1.2)
BUN SERPL-MCNC: 8 MG/DL (ref 6–20)
BUN/CREAT SERPL: 10.1 (ref 7–25)
CALCIUM SPEC-SCNC: 9.7 MG/DL (ref 8.6–10.5)
CHLORIDE SERPL-SCNC: 105 MMOL/L (ref 98–107)
CHOLEST SERPL-MCNC: 232 MG/DL (ref 0–200)
CO2 SERPL-SCNC: 26 MMOL/L (ref 22–29)
CREAT SERPL-MCNC: 0.79 MG/DL (ref 0.57–1)
DEPRECATED RDW RBC AUTO: 47.2 FL (ref 37–54)
EGFRCR SERPLBLD CKD-EPI 2021: 87.9 ML/MIN/1.73
EOSINOPHIL # BLD AUTO: 0.18 10*3/MM3 (ref 0–0.4)
EOSINOPHIL NFR BLD AUTO: 2.2 % (ref 0.3–6.2)
ERYTHROCYTE [DISTWIDTH] IN BLOOD BY AUTOMATED COUNT: 13.2 % (ref 12.3–15.4)
GLOBULIN UR ELPH-MCNC: 3.2 GM/DL
GLUCOSE SERPL-MCNC: 109 MG/DL (ref 65–99)
HCT VFR BLD AUTO: 42.2 % (ref 34–46.6)
HDLC SERPL-MCNC: 45 MG/DL (ref 40–60)
HGB BLD-MCNC: 14.4 G/DL (ref 12–15.9)
IMM GRANULOCYTES # BLD AUTO: 0.04 10*3/MM3 (ref 0–0.05)
IMM GRANULOCYTES NFR BLD AUTO: 0.5 % (ref 0–0.5)
IRON 24H UR-MRATE: 74 MCG/DL (ref 37–145)
IRON SATN MFR SERPL: 15 % (ref 20–50)
LDLC SERPL CALC-MCNC: 138 MG/DL (ref 0–100)
LDLC/HDLC SERPL: 2.94 {RATIO}
LYMPHOCYTES # BLD AUTO: 1.95 10*3/MM3 (ref 0.7–3.1)
LYMPHOCYTES NFR BLD AUTO: 23.4 % (ref 19.6–45.3)
MCH RBC QN AUTO: 33 PG (ref 26.6–33)
MCHC RBC AUTO-ENTMCNC: 34.1 G/DL (ref 31.5–35.7)
MCV RBC AUTO: 96.8 FL (ref 79–97)
MONOCYTES # BLD AUTO: 0.81 10*3/MM3 (ref 0.1–0.9)
MONOCYTES NFR BLD AUTO: 9.7 % (ref 5–12)
NEUTROPHILS NFR BLD AUTO: 5.29 10*3/MM3 (ref 1.7–7)
NEUTROPHILS NFR BLD AUTO: 63.5 % (ref 42.7–76)
NRBC BLD AUTO-RTO: 0 /100 WBC (ref 0–0.2)
PLATELET # BLD AUTO: 265 10*3/MM3 (ref 140–450)
PMV BLD AUTO: 11.1 FL (ref 6–12)
POTASSIUM SERPL-SCNC: 5.2 MMOL/L (ref 3.5–5.2)
PROT SERPL-MCNC: 7.2 G/DL (ref 6–8.5)
RBC # BLD AUTO: 4.36 10*6/MM3 (ref 3.77–5.28)
SODIUM SERPL-SCNC: 141 MMOL/L (ref 136–145)
TIBC SERPL-MCNC: 486 MCG/DL (ref 298–536)
TRANSFERRIN SERPL-MCNC: 326 MG/DL (ref 200–360)
TRIGL SERPL-MCNC: 274 MG/DL (ref 0–150)
TSH SERPL DL<=0.05 MIU/L-ACNC: 2.86 UIU/ML (ref 0.27–4.2)
VLDLC SERPL-MCNC: 49 MG/DL (ref 5–40)
WBC NRBC COR # BLD AUTO: 8.33 10*3/MM3 (ref 3.4–10.8)

## 2024-07-31 ENCOUNTER — TELEPHONE (OUTPATIENT)
Dept: FAMILY MEDICINE CLINIC | Facility: CLINIC | Age: 59
End: 2024-07-31

## 2024-07-31 LAB
FOLATE BLD-MCNC: >620 NG/ML
FOLATE RBC-MCNC: >1452 NG/ML
HCT VFR BLD AUTO: 42.7 % (ref 34–46.6)

## 2024-07-31 NOTE — TELEPHONE ENCOUNTER
Caller: Ailyn Samano    Relationship: Self    Best call back number: [phone]     What is the best time to reach you: ANY    Who are you requesting to speak with (clinical staff, provider,  specific staff member): CLINICAL         What was the call regarding: PATIENT STATED THAT SHE WAS TOLD TO WAIT UNTIL HER LABS CAME BACK BEFORE SHE WAS ABLE TO TAKE ANYTHING FOR HER BACK PAIN. PLEASE CALL PATIENT WITH UPDATE.

## 2024-08-05 LAB — METHYLMALONATE SERPL-SCNC: 168 NMOL/L (ref 0–378)

## 2024-08-23 ENCOUNTER — HOSPITAL ENCOUNTER (OUTPATIENT)
Dept: BONE DENSITY | Facility: HOSPITAL | Age: 59
Discharge: HOME | End: 2024-08-23
Payer: COMMERCIAL

## 2024-08-23 ENCOUNTER — HOSPITAL ENCOUNTER (OUTPATIENT)
Dept: MAMMOGRAPHY | Facility: HOSPITAL | Age: 59
Discharge: HOME | End: 2024-08-23
Payer: COMMERCIAL

## 2024-08-23 ENCOUNTER — HOSPITAL ENCOUNTER (OUTPATIENT)
Dept: ULTRASOUND IMAGING | Facility: HOSPITAL | Age: 59
Discharge: HOME | End: 2024-08-23
Payer: COMMERCIAL

## 2024-08-23 DIAGNOSIS — Z12.31 ENCOUNTER FOR SCREENING MAMMOGRAM FOR MALIGNANT NEOPLASM OF BREAST: ICD-10-CM

## 2024-08-23 DIAGNOSIS — R10.11 CHRONIC RUQ PAIN: Chronic | ICD-10-CM

## 2024-08-23 DIAGNOSIS — G89.29 CHRONIC RUQ PAIN: Chronic | ICD-10-CM

## 2024-08-23 DIAGNOSIS — Z78.0 MENOPAUSE: ICD-10-CM

## 2024-08-23 PROCEDURE — 77067 SCR MAMMO BI INCL CAD: CPT

## 2024-08-23 PROCEDURE — 77063 BREAST TOMOSYNTHESIS BI: CPT

## 2024-08-23 PROCEDURE — 77080 DXA BONE DENSITY AXIAL: CPT

## 2024-08-23 PROCEDURE — 76705 ECHO EXAM OF ABDOMEN: CPT

## 2024-08-26 ENCOUNTER — OFFICE VISIT (OUTPATIENT)
Dept: FAMILY MEDICINE CLINIC | Facility: CLINIC | Age: 59
End: 2024-08-26
Payer: COMMERCIAL

## 2024-08-26 VITALS
DIASTOLIC BLOOD PRESSURE: 70 MMHG | OXYGEN SATURATION: 97 % | SYSTOLIC BLOOD PRESSURE: 110 MMHG | BODY MASS INDEX: 28.2 KG/M2 | HEART RATE: 96 BPM | HEIGHT: 70 IN | WEIGHT: 197 LBS | TEMPERATURE: 97.6 F

## 2024-08-26 DIAGNOSIS — K76.0 FATTY LIVER: Chronic | ICD-10-CM

## 2024-08-26 DIAGNOSIS — E66.3 OVERWEIGHT (BMI 25.0-29.9): Chronic | ICD-10-CM

## 2024-08-26 DIAGNOSIS — M81.0 SENILE OSTEOPOROSIS: Chronic | ICD-10-CM

## 2024-08-26 DIAGNOSIS — E78.2 MIXED HYPERLIPIDEMIA: Primary | Chronic | ICD-10-CM

## 2024-08-26 PROCEDURE — 99214 OFFICE O/P EST MOD 30 MIN: CPT | Performed by: PHYSICIAN ASSISTANT

## 2024-08-26 PROCEDURE — 1159F MED LIST DOCD IN RCRD: CPT | Performed by: PHYSICIAN ASSISTANT

## 2024-08-26 PROCEDURE — 3078F DIAST BP <80 MM HG: CPT | Performed by: PHYSICIAN ASSISTANT

## 2024-08-26 PROCEDURE — 1160F RVW MEDS BY RX/DR IN RCRD: CPT | Performed by: PHYSICIAN ASSISTANT

## 2024-08-26 PROCEDURE — 3074F SYST BP LT 130 MM HG: CPT | Performed by: PHYSICIAN ASSISTANT

## 2024-08-26 RX ORDER — ALENDRONATE SODIUM 70 MG/1
70 TABLET ORAL
Qty: 5 TABLET | Refills: 5 | Status: SHIPPED | OUTPATIENT
Start: 2024-08-26

## 2024-08-26 RX ORDER — TOPIRAMATE 25 MG/1
25 TABLET, FILM COATED ORAL SEE ADMIN INSTRUCTIONS
Qty: 60 TABLET | Refills: 1 | Status: SHIPPED | OUTPATIENT
Start: 2024-08-26

## 2024-08-26 RX ORDER — ROSUVASTATIN CALCIUM 20 MG/1
20 TABLET, COATED ORAL DAILY
Qty: 30 TABLET | Refills: 5 | Status: SHIPPED | OUTPATIENT
Start: 2024-08-26

## 2024-08-26 NOTE — PROGRESS NOTES
"Chief Complaint -hyperlipidemia    History of Present Illness -     Ailyn Samano is a 58 y.o. female.     Her friend Tayla Hawley is here with her today helping with history.    Hyperlipidemia-  Not at goal with recent lab work showing elevated cholesterol triglycerides and LDL.  Patient states that she takes her rosuvastatin 10 mg \"when she can remember.\"  She states she takes it about twice a week.    Osteoporosis-  8/23/2024 DEXA bone density scan revealed left femur neck with a T-score of -2.0.  She has had several fractures in her life including having a steel plate in her right lower extremity.  Given her history of fractures along with the low bone mass is consistent with clinical diagnosis of osteoporosis.  Patient is at moderate risk of fracture.    Overweight-  Patient reports increased appetite and has gained weight.  Patient currently weighs 197 pounds and states that she has not weighed this much since she was pregnant.  She is interested in medication to help suppress her appetite.    Abnormal US liver-  8/23/24 US of liver revealed fatty liver.  This was discussed with the patient today.  Patient states that she has not drank any alcohol in approximately 1 month.      The following portions of the patient's history were reviewed and updated as appropriate: allergies, current medications, past family history, past medical history, past social history, past surgical history and problem list.        Objective  Vital signs:  /70   Pulse 96   Temp 97.6 °F (36.4 °C) (Temporal)   Ht 177.8 cm (70\")   Wt 89.4 kg (197 lb)   SpO2 97%   BMI 28.27 kg/m²     Physical Exam  Vitals and nursing note reviewed.   Constitutional:       Appearance: Normal appearance. She is well-developed.   Eyes:      Extraocular Movements: Extraocular movements intact.      Conjunctiva/sclera: Conjunctivae normal.   Cardiovascular:      Rate and Rhythm: Normal rate and regular rhythm.      Heart sounds: Normal heart sounds. " No murmur heard.  Pulmonary:      Effort: Pulmonary effort is normal. No respiratory distress.      Breath sounds: Normal breath sounds. No wheezing.   Musculoskeletal:         General: No tenderness.   Skin:     General: Skin is warm and dry.      Findings: No rash.   Neurological:      Mental Status: She is alert and oriented to person, place, and time.   Psychiatric:         Mood and Affect: Mood normal.         Behavior: Behavior normal.         Thought Content: Thought content normal.         The following data was reviewed by Mary Egan PA-C:         Lab Results   Component Value Date    BUN 8 07/29/2024    CREATININE 0.79 07/29/2024    EGFR 87.9 07/29/2024    ALT 19 07/29/2024    AST 25 07/29/2024    WBC 8.33 07/29/2024    HGB 14.4 07/29/2024    HCT 42.7 07/29/2024    HCT 42.2 07/29/2024     07/29/2024    CHOL 232 (H) 07/29/2024    TRIG 274 (H) 07/29/2024    HDL 45 07/29/2024     (H) 07/29/2024    TSH 2.860 07/29/2024           Assessment & Plan     Diagnoses and all orders for this visit:    1. Mixed hyperlipidemia (Primary)  Comments:  Increase rosuvastatin 20 mg daily  Urged patient to be compliant with taking medication daily to improve outcomes  Orders:  -     rosuvastatin (Crestor) 20 MG tablet; Take 1 tablet by mouth Daily.  Dispense: 30 tablet; Refill: 5  -     Comprehensive Metabolic Panel; Future  -     Lipid Panel; Future    2. Senile osteoporosis  Comments:  Fall risk precautions advised  Start Fosamax, calcium, and vitamin D  Orders:  -     alendronate (Fosamax) 70 MG tablet; Take 1 tablet by mouth Every 7 (Seven) Days.  Dispense: 5 tablet; Refill: 5    3. Overweight (BMI 25.0-29.9)  Comments:  Advise 30 minutes CV activity daily  Advised a low-cholesterol diet  Start Topamax to help with appetite suppression  Orders:  -     topiramate (TOPAMAX) 25 MG tablet; Take 1 tablet by mouth See Admin Instructions. 1 tab qhs x 2 weeks then 1 tab bid therafter  Dispense: 60 tablet; Refill:  1    4. Fatty liver  Comments:  Discussed diagnosis of fatty liver  Advised alcohol cessation and avoidance of Tylenol  Encouraged patient on weight loss                   Patient was given instructions and counseling regarding his condition or for health maintenance advice. Please see specific information pulled into the AVS if appropriate      This document has been electronically signed by:  Mary Egan PA-C

## 2024-09-11 DIAGNOSIS — J41.8 MIXED SIMPLE AND MUCOPURULENT CHRONIC BRONCHITIS: Chronic | ICD-10-CM

## 2024-09-12 RX ORDER — ALBUTEROL SULFATE 90 UG/1
AEROSOL, METERED RESPIRATORY (INHALATION)
Qty: 18 G | Refills: 5 | Status: SHIPPED | OUTPATIENT
Start: 2024-09-12

## 2024-09-23 ENCOUNTER — OFFICE VISIT (OUTPATIENT)
Dept: FAMILY MEDICINE CLINIC | Facility: CLINIC | Age: 59
End: 2024-09-23
Payer: COMMERCIAL

## 2024-09-23 VITALS
WEIGHT: 202 LBS | DIASTOLIC BLOOD PRESSURE: 70 MMHG | TEMPERATURE: 97.3 F | HEART RATE: 79 BPM | BODY MASS INDEX: 28.92 KG/M2 | OXYGEN SATURATION: 96 % | HEIGHT: 70 IN | SYSTOLIC BLOOD PRESSURE: 120 MMHG

## 2024-09-23 DIAGNOSIS — F51.04 PSYCHOPHYSIOLOGICAL INSOMNIA: Chronic | ICD-10-CM

## 2024-09-23 DIAGNOSIS — K26.9 MULTIPLE DUODENAL ULCERS: Chronic | ICD-10-CM

## 2024-09-23 DIAGNOSIS — E66.3 OVERWEIGHT (BMI 25.0-29.9): Primary | Chronic | ICD-10-CM

## 2024-09-23 DIAGNOSIS — I87.2 PERIPHERAL VENOUS INSUFFICIENCY: Chronic | ICD-10-CM

## 2024-09-23 DIAGNOSIS — M81.0 SENILE OSTEOPOROSIS: Chronic | ICD-10-CM

## 2024-09-23 DIAGNOSIS — M48.062 SPINAL STENOSIS OF LUMBAR REGION WITH NEUROGENIC CLAUDICATION: Chronic | ICD-10-CM

## 2024-09-23 PROCEDURE — 3074F SYST BP LT 130 MM HG: CPT | Performed by: PHYSICIAN ASSISTANT

## 2024-09-23 PROCEDURE — 99214 OFFICE O/P EST MOD 30 MIN: CPT | Performed by: PHYSICIAN ASSISTANT

## 2024-09-23 PROCEDURE — 1159F MED LIST DOCD IN RCRD: CPT | Performed by: PHYSICIAN ASSISTANT

## 2024-09-23 PROCEDURE — 3078F DIAST BP <80 MM HG: CPT | Performed by: PHYSICIAN ASSISTANT

## 2024-09-23 PROCEDURE — 1160F RVW MEDS BY RX/DR IN RCRD: CPT | Performed by: PHYSICIAN ASSISTANT

## 2024-09-23 RX ORDER — SPIRONOLACTONE 25 MG/1
25 TABLET ORAL DAILY PRN
Qty: 30 TABLET | Refills: 0 | Status: SHIPPED | OUTPATIENT
Start: 2024-09-23

## 2024-09-23 RX ORDER — TOPIRAMATE 50 MG/1
50 TABLET, FILM COATED ORAL 2 TIMES DAILY
Qty: 60 TABLET | Refills: 3 | Status: SHIPPED | OUTPATIENT
Start: 2024-09-23

## 2024-09-23 RX ORDER — ALENDRONATE SODIUM 70 MG/1
70 TABLET ORAL
Qty: 5 TABLET | Refills: 5 | Status: SHIPPED | OUTPATIENT
Start: 2024-09-23

## 2024-10-22 ENCOUNTER — TELEPHONE (OUTPATIENT)
Dept: FAMILY MEDICINE CLINIC | Facility: CLINIC | Age: 59
End: 2024-10-22

## 2024-10-22 RX ORDER — CYCLOBENZAPRINE HCL 10 MG
10 TABLET ORAL 2 TIMES DAILY PRN
Qty: 30 TABLET | Refills: 0 | Status: SHIPPED | OUTPATIENT
Start: 2024-10-22

## 2024-10-22 NOTE — TELEPHONE ENCOUNTER
Inform the patient to start ibuprofen over-the-counter and I sent in Flexeril that will help as a muscle relaxer in case this is a cervical strain.  If there was an injury where she could have fractured her neck then I recommend that she come into the office for a visit so that we can evaluate if she needs x-rays.

## 2024-10-22 NOTE — TELEPHONE ENCOUNTER
Caller: Ailyn Samano    Relationship: Self    Best call back number: [phone]     What is the best time to reach you: ANY    Who are you requesting to speak with (clinical staff, provider,  specific staff member): NURSE    Do you know the name of the person who called: PATIENT    What was the call regarding: PATIENT HAS DONE SOMETHING TO HER NECK AND CAN HARDLY TURN HER HEAD.  WOULD LIKE MEDICATION FOR THIS.     PHARMACY:  [facility]    Is it okay if the provider responds through MyChart: PHONE CALL PLEASE

## 2024-10-23 NOTE — TELEPHONE ENCOUNTER
Name: Selwyn Ailyn    Relationship: Self    Best Callback Number: [phone]    HUB PROVIDED THE RELAY MESSAGE FROM THE OFFICE   PATIENT VOICED UNDERSTANDING AND HAS NO FURTHER QUESTIONS AT THIS TIME

## 2024-11-04 ENCOUNTER — OFFICE VISIT (OUTPATIENT)
Dept: FAMILY MEDICINE CLINIC | Facility: CLINIC | Age: 59
End: 2024-11-04
Payer: COMMERCIAL

## 2024-11-04 VITALS
TEMPERATURE: 98.9 F | BODY MASS INDEX: 27.06 KG/M2 | WEIGHT: 189 LBS | DIASTOLIC BLOOD PRESSURE: 74 MMHG | OXYGEN SATURATION: 100 % | SYSTOLIC BLOOD PRESSURE: 120 MMHG | HEART RATE: 94 BPM | HEIGHT: 70 IN

## 2024-11-04 DIAGNOSIS — E78.2 MIXED HYPERLIPIDEMIA: Chronic | ICD-10-CM

## 2024-11-04 DIAGNOSIS — M15.0 PRIMARY OSTEOARTHRITIS INVOLVING MULTIPLE JOINTS: Chronic | ICD-10-CM

## 2024-11-04 DIAGNOSIS — G25.81 RLS (RESTLESS LEGS SYNDROME): Primary | Chronic | ICD-10-CM

## 2024-11-04 DIAGNOSIS — K76.0 FATTY LIVER: Chronic | ICD-10-CM

## 2024-11-04 LAB
ALBUMIN SERPL-MCNC: 3.8 G/DL (ref 3.5–5.2)
ALBUMIN/GLOB SERPL: 1.2 G/DL
ALP SERPL-CCNC: 140 U/L (ref 39–117)
ALT SERPL W P-5'-P-CCNC: 14 U/L (ref 1–33)
ANION GAP SERPL CALCULATED.3IONS-SCNC: 11.9 MMOL/L (ref 5–15)
AST SERPL-CCNC: 14 U/L (ref 1–32)
BILIRUB SERPL-MCNC: <0.2 MG/DL (ref 0–1.2)
BUN SERPL-MCNC: 7 MG/DL (ref 6–20)
BUN/CREAT SERPL: 10.1 (ref 7–25)
CALCIUM SPEC-SCNC: 9.3 MG/DL (ref 8.6–10.5)
CHLORIDE SERPL-SCNC: 106 MMOL/L (ref 98–107)
CHOLEST SERPL-MCNC: 190 MG/DL (ref 0–200)
CO2 SERPL-SCNC: 23.1 MMOL/L (ref 22–29)
CREAT SERPL-MCNC: 0.69 MG/DL (ref 0.57–1)
EGFRCR SERPLBLD CKD-EPI 2021: 101.4 ML/MIN/1.73
GLOBULIN UR ELPH-MCNC: 3.3 GM/DL
GLUCOSE SERPL-MCNC: 111 MG/DL (ref 65–99)
HDLC SERPL-MCNC: 28 MG/DL (ref 40–60)
LDLC SERPL CALC-MCNC: 122 MG/DL (ref 0–100)
LDLC/HDLC SERPL: 4.19 {RATIO}
POTASSIUM SERPL-SCNC: 4 MMOL/L (ref 3.5–5.2)
PROT SERPL-MCNC: 7.1 G/DL (ref 6–8.5)
SODIUM SERPL-SCNC: 141 MMOL/L (ref 136–145)
TRIGL SERPL-MCNC: 224 MG/DL (ref 0–150)
VLDLC SERPL-MCNC: 40 MG/DL (ref 5–40)

## 2024-11-04 PROCEDURE — 3078F DIAST BP <80 MM HG: CPT | Performed by: PHYSICIAN ASSISTANT

## 2024-11-04 PROCEDURE — 99214 OFFICE O/P EST MOD 30 MIN: CPT | Performed by: PHYSICIAN ASSISTANT

## 2024-11-04 PROCEDURE — 1160F RVW MEDS BY RX/DR IN RCRD: CPT | Performed by: PHYSICIAN ASSISTANT

## 2024-11-04 PROCEDURE — 1159F MED LIST DOCD IN RCRD: CPT | Performed by: PHYSICIAN ASSISTANT

## 2024-11-04 PROCEDURE — 36415 COLL VENOUS BLD VENIPUNCTURE: CPT | Performed by: PHYSICIAN ASSISTANT

## 2024-11-04 PROCEDURE — 80053 COMPREHEN METABOLIC PANEL: CPT | Performed by: PHYSICIAN ASSISTANT

## 2024-11-04 PROCEDURE — 80061 LIPID PANEL: CPT | Performed by: PHYSICIAN ASSISTANT

## 2024-11-04 PROCEDURE — 3074F SYST BP LT 130 MM HG: CPT | Performed by: PHYSICIAN ASSISTANT

## 2024-11-04 RX ORDER — ROPINIROLE 0.25 MG/1
0.25 TABLET, FILM COATED ORAL SEE ADMIN INSTRUCTIONS
Qty: 120 TABLET | Refills: 0 | Status: SHIPPED | OUTPATIENT
Start: 2024-11-04

## 2024-11-04 RX ORDER — CELECOXIB 200 MG/1
200 CAPSULE ORAL DAILY
Qty: 30 CAPSULE | Refills: 5 | Status: SHIPPED | OUTPATIENT
Start: 2024-11-04

## 2024-11-04 NOTE — PROGRESS NOTES
"Chief Complaint -trouble sleeping    History of Present Illness -     Ailyn Samano is a 59 y.o. female.     Trouble sleeping-  She complains of trouble sleeping due to involuntary leg movements and leg pain.    Joint pain-  Patient complains of pain in multiple joints including hips and knees.  She describes a stiffness that is worse when she gets up in the morning.  Joint pain is worse with prolonged walking such as at the [employer] festival causing pain the next day.    Hyperlipidemia-  This is due to be rechecked because it was uncontrolled before increasing rosuvastatin to 20 mg daily.  She states she has been compliant with taking the medication daily    Fatty liver-  Patient states that she stopped drinking alcohol 4 months ago and continues to be alcohol free.  She is also working on her diet and has lost 13 pounds in the last 6 weeks.    The following portions of the patient's history were reviewed and updated as appropriate: allergies, current medications, past family history, past medical history, past social history, past surgical history and problem list.        Objective  Vital signs:  /74   Pulse 94   Temp 98.9 °F (37.2 °C) (Temporal)   Ht 177.8 cm (70\")   Wt 85.7 kg (189 lb)   SpO2 100%   BMI 27.12 kg/m²     Physical Exam  Vitals and nursing note reviewed.   Constitutional:       Appearance: Normal appearance. She is well-developed.   Eyes:      Extraocular Movements: Extraocular movements intact.      Conjunctiva/sclera: Conjunctivae normal.   Cardiovascular:      Rate and Rhythm: Normal rate and regular rhythm.      Heart sounds: Normal heart sounds. No murmur heard.  Pulmonary:      Effort: Pulmonary effort is normal. No respiratory distress.      Breath sounds: Normal breath sounds. No wheezing.   Musculoskeletal:         General: No tenderness.   Skin:     General: Skin is warm and dry.      Findings: No rash.   Neurological:      Mental Status: She is alert and oriented to person, " place, and time.   Psychiatric:         Mood and Affect: Mood normal.         Behavior: Behavior normal.         Thought Content: Thought content normal.         The following data was reviewed by Mary Egan PA-C:         Lab Results   Component Value Date    BUN 8 07/29/2024    CREATININE 0.79 07/29/2024    EGFR 87.9 07/29/2024    ALT 19 07/29/2024    AST 25 07/29/2024    WBC 8.33 07/29/2024    HGB 14.4 07/29/2024    HCT 42.7 07/29/2024    HCT 42.2 07/29/2024     07/29/2024    CHOL 232 (H) 07/29/2024    TRIG 274 (H) 07/29/2024    HDL 45 07/29/2024     (H) 07/29/2024    TSH 2.860 07/29/2024           Assessment & Plan     Diagnoses and all orders for this visit:    1. RLS (restless legs syndrome) (Primary)  Comments:  Start Requip and titrate up to 1 mg nightly  Orders:  -     rOPINIRole (REQUIP) 0.25 MG tablet; Take 1 tablet by mouth See Admin Instructions. 1 tab qhs x 3 nights then 2 tabs qhs x 3 nights then 4 tabs qhs therafter  Dispense: 120 tablet; Refill: 0    2. Primary osteoarthritis involving multiple joints  Comments:  Start Celebrex every morning  Advised conservative measures such as warm baths  Orders:  -     celecoxib (CeleBREX) 200 MG capsule; Take 1 capsule by mouth Daily.  Dispense: 30 capsule; Refill: 5    3. Mixed hyperlipidemia  Comments:  Continue rosuvastatin and recheck lab work today  Orders:  -     Comprehensive Metabolic Panel  -     Lipid Panel    4. Fatty liver  Comments:  Encouraged patient to continue alcohol cessation and eating healthy  Advise 30 minutes CV activity daily as tolerated                   Patient was given instructions and counseling regarding his condition or for health maintenance advice. Please see specific information pulled into the AVS if appropriate      This document has been electronically signed by:  Mary Egan PA-C

## 2024-11-08 ENCOUNTER — TELEPHONE (OUTPATIENT)
Dept: FAMILY MEDICINE CLINIC | Facility: CLINIC | Age: 59
End: 2024-11-08

## 2024-11-08 NOTE — TELEPHONE ENCOUNTER
Caller: Ailyn Samano    Relationship: Self    Best call back number: [phone]     Caller requesting test results: PATIENT    What test was performed: LABS    When was the test performed: ONE WEEK AGO    Where was the test performed: [place] OFFICE    Additional notes:

## 2024-11-09 DIAGNOSIS — L40.9 PSORIASIS: Chronic | ICD-10-CM

## 2024-11-11 RX ORDER — TRIAMCINOLONE ACETONIDE 5 MG/G
CREAM TOPICAL
Qty: 60 G | Refills: 3 | Status: SHIPPED | OUTPATIENT
Start: 2024-11-11

## 2024-11-11 NOTE — TELEPHONE ENCOUNTER
Name: SamanoAilyn      Relationship: Self      Best Callback Number:     [phone] (         HUB PROVIDED THE RELAY MESSAGE FROM THE OFFICE      PATIENT: VOICED UNDERSTANDING AND HAS NO FURTHER QUESTIONS AT THIS TIME

## 2024-11-11 NOTE — TELEPHONE ENCOUNTER
See malignant neoplasm of both ovaries     Unable to reach the patient. Could not leave voicemail. Will try again later.     Hub to read/relay!

## 2024-11-26 DIAGNOSIS — G25.81 RLS (RESTLESS LEGS SYNDROME): Chronic | ICD-10-CM

## 2024-12-02 RX ORDER — ROPINIROLE 0.25 MG/1
TABLET, FILM COATED ORAL
Qty: 120 TABLET | Refills: 0 | Status: SHIPPED | OUTPATIENT
Start: 2024-12-02

## 2024-12-02 RX ORDER — CYCLOBENZAPRINE HCL 10 MG
10 TABLET ORAL 2 TIMES DAILY PRN
Qty: 30 TABLET | Refills: 0 | Status: SHIPPED | OUTPATIENT
Start: 2024-12-02

## 2025-01-22 DIAGNOSIS — G25.81 RLS (RESTLESS LEGS SYNDROME): Chronic | ICD-10-CM

## 2025-01-23 RX ORDER — CYCLOBENZAPRINE HCL 10 MG
10 TABLET ORAL 2 TIMES DAILY PRN
Qty: 30 TABLET | Refills: 0 | Status: SHIPPED | OUTPATIENT
Start: 2025-01-23

## 2025-01-23 RX ORDER — ROPINIROLE 0.25 MG/1
TABLET, FILM COATED ORAL
Qty: 120 TABLET | Refills: 0 | Status: SHIPPED | OUTPATIENT
Start: 2025-01-23

## 2025-01-24 NOTE — PLAN OF CARE
Problem: [facility] Patient Care Overview (Adult)  Goal: Plan of Care Review  Outcome: Ongoing (interventions implemented as appropriate)    07/17/17 1508   Coping/Psychosocial Response Interventions   Plan Of Care Reviewed With patient   Coping/Psychosocial   Patient Agreement with Plan of Care agrees   Patient Care Overview   Progress no change   Outcome Evaluation   Outcome Summary/Follow up Plan pt still wanting to leave but is agreeable to stay a couple more days.            normal

## 2025-02-19 DIAGNOSIS — G25.81 RLS (RESTLESS LEGS SYNDROME): Chronic | ICD-10-CM

## 2025-02-19 RX ORDER — CYCLOBENZAPRINE HCL 10 MG
10 TABLET ORAL 2 TIMES DAILY PRN
Qty: 30 TABLET | Refills: 3 | OUTPATIENT
Start: 2025-02-19

## 2025-02-19 RX ORDER — ROPINIROLE 0.25 MG/1
TABLET, FILM COATED ORAL
Qty: 120 TABLET | Refills: 3 | OUTPATIENT
Start: 2025-02-19

## 2025-02-20 DIAGNOSIS — G25.81 RLS (RESTLESS LEGS SYNDROME): Chronic | ICD-10-CM

## 2025-02-20 RX ORDER — CYCLOBENZAPRINE HCL 10 MG
10 TABLET ORAL 2 TIMES DAILY PRN
Qty: 30 TABLET | Refills: 3 | OUTPATIENT
Start: 2025-02-20

## 2025-02-20 RX ORDER — ROPINIROLE 0.25 MG/1
TABLET, FILM COATED ORAL
Qty: 120 TABLET | Refills: 0 | OUTPATIENT
Start: 2025-02-20

## 2025-02-20 RX ORDER — ROPINIROLE 0.25 MG/1
TABLET, FILM COATED ORAL
Qty: 120 TABLET | Refills: 3 | OUTPATIENT
Start: 2025-02-20

## 2025-02-20 RX ORDER — ROPINIROLE 0.25 MG/1
TABLET, FILM COATED ORAL
Qty: 120 TABLET | Refills: 5 | OUTPATIENT
Start: 2025-02-20

## 2025-02-20 RX ORDER — CYCLOBENZAPRINE HCL 10 MG
10 TABLET ORAL 2 TIMES DAILY PRN
Qty: 30 TABLET | Refills: 5 | OUTPATIENT
Start: 2025-02-20

## 2025-02-20 RX ORDER — CYCLOBENZAPRINE HCL 10 MG
10 TABLET ORAL 2 TIMES DAILY PRN
Qty: 30 TABLET | Refills: 0 | OUTPATIENT
Start: 2025-02-20

## 2025-02-26 DIAGNOSIS — J41.8 MIXED SIMPLE AND MUCOPURULENT CHRONIC BRONCHITIS: Chronic | ICD-10-CM

## 2025-02-27 RX ORDER — ALBUTEROL SULFATE 90 UG/1
AEROSOL, METERED RESPIRATORY (INHALATION)
Qty: 18 G | Refills: 0 | Status: SHIPPED | OUTPATIENT
Start: 2025-02-27

## 2025-03-09 DIAGNOSIS — M81.0 SENILE OSTEOPOROSIS: Chronic | ICD-10-CM

## 2025-03-10 RX ORDER — ALENDRONATE SODIUM 70 MG/1
70 TABLET ORAL
Qty: 4 TABLET | Refills: 5 | Status: SHIPPED | OUTPATIENT
Start: 2025-03-10

## 2025-03-11 DIAGNOSIS — E78.2 MIXED HYPERLIPIDEMIA: Chronic | ICD-10-CM

## 2025-03-11 RX ORDER — ROSUVASTATIN CALCIUM 20 MG/1
20 TABLET, COATED ORAL DAILY
Qty: 30 TABLET | Refills: 5 | Status: SHIPPED | OUTPATIENT
Start: 2025-03-11

## 2025-03-29 DIAGNOSIS — L40.9 PSORIASIS: Chronic | ICD-10-CM

## 2025-03-31 ENCOUNTER — TELEPHONE (OUTPATIENT)
Dept: FAMILY MEDICINE CLINIC | Facility: CLINIC | Age: 60
End: 2025-03-31
Payer: COMMERCIAL

## 2025-03-31 RX ORDER — TRIAMCINOLONE ACETONIDE 5 MG/G
CREAM TOPICAL
Qty: 60 G | Refills: 3 | Status: SHIPPED | OUTPATIENT
Start: 2025-03-31

## 2025-03-31 NOTE — TELEPHONE ENCOUNTER
HUB TO RELAY     Ailyn will need an appt for further refills     Left voice mail for her to call and schedule

## 2025-08-13 ENCOUNTER — TELEPHONE (OUTPATIENT)
Dept: FAMILY MEDICINE CLINIC | Facility: CLINIC | Age: 60
End: 2025-08-13
Payer: COMMERCIAL